# Patient Record
Sex: FEMALE | Race: WHITE | NOT HISPANIC OR LATINO | URBAN - METROPOLITAN AREA
[De-identification: names, ages, dates, MRNs, and addresses within clinical notes are randomized per-mention and may not be internally consistent; named-entity substitution may affect disease eponyms.]

---

## 2017-09-02 ENCOUNTER — EMERGENCY (EMERGENCY)
Facility: HOSPITAL | Age: 72
LOS: 0 days | Discharge: HOME | End: 2017-09-03

## 2017-09-02 DIAGNOSIS — E87.6 HYPOKALEMIA: ICD-10-CM

## 2017-09-02 DIAGNOSIS — Z90.49 ACQUIRED ABSENCE OF OTHER SPECIFIED PARTS OF DIGESTIVE TRACT: ICD-10-CM

## 2017-09-02 DIAGNOSIS — E87.1 HYPO-OSMOLALITY AND HYPONATREMIA: ICD-10-CM

## 2017-09-02 DIAGNOSIS — R07.9 CHEST PAIN, UNSPECIFIED: ICD-10-CM

## 2017-09-02 DIAGNOSIS — J15.9 UNSPECIFIED BACTERIAL PNEUMONIA: ICD-10-CM

## 2017-09-02 DIAGNOSIS — I10 ESSENTIAL (PRIMARY) HYPERTENSION: ICD-10-CM

## 2017-09-02 DIAGNOSIS — K14.8 OTHER DISEASES OF TONGUE: ICD-10-CM

## 2017-09-02 DIAGNOSIS — I25.810 ATHEROSCLEROSIS OF CORONARY ARTERY BYPASS GRAFT(S) WITHOUT ANGINA PECTORIS: ICD-10-CM

## 2017-09-02 DIAGNOSIS — Z88.0 ALLERGY STATUS TO PENICILLIN: ICD-10-CM

## 2017-09-02 DIAGNOSIS — Z95.1 PRESENCE OF AORTOCORONARY BYPASS GRAFT: ICD-10-CM

## 2017-09-02 DIAGNOSIS — Z79.899 OTHER LONG TERM (CURRENT) DRUG THERAPY: ICD-10-CM

## 2017-09-02 DIAGNOSIS — M54.9 DORSALGIA, UNSPECIFIED: ICD-10-CM

## 2018-02-13 ENCOUNTER — INPATIENT (INPATIENT)
Facility: HOSPITAL | Age: 73
LOS: 1 days | Discharge: HOME | End: 2018-02-15
Attending: HOSPITALIST

## 2018-02-13 VITALS
HEIGHT: 62 IN | SYSTOLIC BLOOD PRESSURE: 154 MMHG | HEART RATE: 114 BPM | TEMPERATURE: 101 F | DIASTOLIC BLOOD PRESSURE: 100 MMHG | RESPIRATION RATE: 19 BRPM | WEIGHT: 132.06 LBS | OXYGEN SATURATION: 93 %

## 2018-02-13 LAB
APTT BLD: 23.3 SEC — CRITICAL LOW (ref 27–39.2)
BASOPHILS # BLD AUTO: 0.02 K/UL — SIGNIFICANT CHANGE UP (ref 0–0.2)
BASOPHILS NFR BLD AUTO: 0.1 % — SIGNIFICANT CHANGE UP (ref 0–1)
CK MB CFR SERPL CALC: 1 NG/ML — SIGNIFICANT CHANGE UP (ref 0.6–6.3)
EOSINOPHIL # BLD AUTO: 0.15 K/UL — SIGNIFICANT CHANGE UP (ref 0–0.7)
EOSINOPHIL NFR BLD AUTO: 0.8 % — SIGNIFICANT CHANGE UP (ref 0–8)
FLU A RESULT: NEGATIVE — SIGNIFICANT CHANGE UP
FLU A RESULT: NEGATIVE — SIGNIFICANT CHANGE UP
FLUAV AG NPH QL: NEGATIVE — SIGNIFICANT CHANGE UP
FLUBV AG NPH QL: NEGATIVE — SIGNIFICANT CHANGE UP
HCT VFR BLD CALC: 44.4 % — SIGNIFICANT CHANGE UP (ref 37–47)
HGB BLD-MCNC: 15.2 G/DL — SIGNIFICANT CHANGE UP (ref 14–18)
IMM GRANULOCYTES NFR BLD AUTO: 1.1 % — HIGH (ref 0.1–0.3)
INR BLD: 1.19 RATIO — SIGNIFICANT CHANGE UP (ref 0.65–1.3)
LACTATE SERPL-SCNC: 2.9 MMOL/L — HIGH (ref 0.5–2.2)
LYMPHOCYTES # BLD AUTO: 0.53 K/UL — LOW (ref 1.2–3.4)
LYMPHOCYTES # BLD AUTO: 2.8 % — LOW (ref 20.5–51.1)
MCHC RBC-ENTMCNC: 29.2 PG — SIGNIFICANT CHANGE UP (ref 27–31)
MCHC RBC-ENTMCNC: 34.2 G/DL — SIGNIFICANT CHANGE UP (ref 32–37)
MCV RBC AUTO: 85.2 FL — SIGNIFICANT CHANGE UP (ref 81–91)
MONOCYTES # BLD AUTO: 1.04 K/UL — HIGH (ref 0.1–0.6)
MONOCYTES NFR BLD AUTO: 5.5 % — SIGNIFICANT CHANGE UP (ref 1.7–9.3)
NEUTROPHILS # BLD AUTO: 16.92 K/UL — HIGH (ref 1.4–6.5)
NEUTROPHILS NFR BLD AUTO: 89.7 % — HIGH (ref 42.2–75.2)
NRBC # BLD: 0 /100 WBCS — SIGNIFICANT CHANGE UP (ref 0–0)
PLATELET # BLD AUTO: 188 K/UL — SIGNIFICANT CHANGE UP (ref 130–400)
PROTHROM AB SERPL-ACNC: 13 SEC — HIGH (ref 9.95–12.87)
RBC # BLD: 5.21 M/UL — SIGNIFICANT CHANGE UP (ref 4.2–5.4)
RBC # FLD: 12.7 % — SIGNIFICANT CHANGE UP (ref 11.5–14.5)
TROPONIN I SERPL-MCNC: 0.02 NG/ML — SIGNIFICANT CHANGE UP (ref 0–0.05)
WBC # BLD: 18.86 K/UL — HIGH (ref 4.8–10.8)
WBC # FLD AUTO: 18.86 K/UL — HIGH (ref 4.8–10.8)

## 2018-02-13 RX ORDER — SODIUM CHLORIDE 9 MG/ML
1000 INJECTION INTRAMUSCULAR; INTRAVENOUS; SUBCUTANEOUS ONCE
Qty: 0 | Refills: 0 | Status: COMPLETED | OUTPATIENT
Start: 2018-02-13 | End: 2018-02-13

## 2018-02-13 RX ORDER — MORPHINE SULFATE 50 MG/1
4 CAPSULE, EXTENDED RELEASE ORAL ONCE
Qty: 0 | Refills: 0 | Status: DISCONTINUED | OUTPATIENT
Start: 2018-02-13 | End: 2018-02-13

## 2018-02-13 RX ADMIN — MORPHINE SULFATE 4 MILLIGRAM(S): 50 CAPSULE, EXTENDED RELEASE ORAL at 23:38

## 2018-02-13 RX ADMIN — SODIUM CHLORIDE 1000 MILLILITER(S): 9 INJECTION INTRAMUSCULAR; INTRAVENOUS; SUBCUTANEOUS at 23:09

## 2018-02-13 NOTE — ED PROVIDER NOTE - PROGRESS NOTE DETAILS
I personally evaluated the patient. I reviewed the Resident’s or Physician Assistant’s note (as assigned above), and agree with the findings and plan except as documented in my note.   72yF with hx of HTN, herniated disks, received the flu vaccine this year presents to ED for multiple complaints.  Pt states that last night she developed a dizziness, with nausea, vomiting, and chest pressure.  Vomitus was 3 episodes, non bloody.  Pt throughout today has been with coming and going pressure to the chest.  Pt this evening developed sharp pain to the lower abd.  Pt in the ED states that she is without any CP, SOB, or nausea.  Pt in ED is c/o lower abd pain.  Pt also adds that she follows with pain management for her chronic back pain, states that over the last week she has been having a worsening of her typical back pain.  No trauma, falls, or injury to area.  No sweating.  No SOB. No LOC.  No leg pain or leg swelling.  No cough.  No sore throat.  No dysuria, hematuria.  Pt notes increased urination.  ON EXAM:   Pt is well appearing, non toxic.  Febrile.  CVS tachycardic, regular.  Resp CTA b/l.  abd soft nt/nd. No midline vertebral tendneress. Neck supple neurologically intact   PLAN:  Will check labs, EKG, CXray,  UA, and re eval D/w patient all results and about lymph node and copy given to patient.

## 2018-02-13 NOTE — ED PROVIDER NOTE - NS ED ROS FT
Review of Systems:  	•	CONSTITUTIONAL - no fever, no diaphoresis, no chills  	•	SKIN - no rash  	•	EYES - no eye pain, no blurry vision  	•	ENT - no congestion, no sore throat  	•	RESPIRATORY - no shortness of breath, no cough  	•	CARDIAC - + chest pain, no palpitations  	•	GI - +dark stool + abd pain, + nausea, no vomiting, no diarrhea, no constipation  	•	GENITO-URINARY - + dysuria; no hematuria, no increased urinary frequency  	•	MUSCULOSKELETAL - no joint paint, no swelling, no redness  	•	NEUROLOGIC - no weakness, no headache

## 2018-02-13 NOTE — ED PROVIDER NOTE - CARE PLAN
Principal Discharge DX:	Pyelonephritis  Secondary Diagnosis:	Bronchiectasis  Secondary Diagnosis:	Leukocytosis

## 2018-02-13 NOTE — ED PROVIDER NOTE - PHYSICAL EXAMINATION
VITAL SIGNS: I have reviewed nursing notes and confirm.  CONSTITUTIONAL: Well-developed; well-nourished; in no acute distress.  SKIN: Skin exam is warm and dry, no acute rash.  HEAD: Normocephalic; atraumatic.  EYES: PERRL, EOM intact; conjunctiva and sclera clear.  ENT: No nasal discharge; airway clear.   NECK: Supple; non tender.  CARD: S1, S2 normal; no murmurs, gallops, or rubs. Regular rate and rhythm.  RESP: Clear to auscultation bilaterally. No wheezes, rales or rhonchi.  ABD: Normal bowel sounds; soft; non-distended; mild diffuse tenderness. No rebound tenderness or guarding.   Rectal: Guaiac - brown stool guaiac negative  EXT: Normal ROM. No clubbing, cyanosis or edema.  LYMPH: No acute cervical adenopathy.  NEURO: Alert, oriented. Grossly unremarkable. No focal deficits.  PSYCH: Cooperative, appropriate.

## 2018-02-13 NOTE — ED PROVIDER NOTE - OBJECTIVE STATEMENT
71 yo F with pmhx of CAD s/p CABG, HTN, appendectomy, multiple  presenting with non-radiating, intermittent pressure like left sided pain associated 73 yo F with pmhx of CAD s/p CABG, HTN, appendectomy, multiple  presenting with non-radiating, intermittent pressure like left sided pain associated lightheadedness, nausea, lower abdominal pain, and 1 episode of dark stool yesterday. Denies sob, fever, chills, back pain, headache, cough, congestion, sore throat, joint pain, leg swelling/calf pain, or weakness.

## 2018-02-14 DIAGNOSIS — M54.9 DORSALGIA, UNSPECIFIED: ICD-10-CM

## 2018-02-14 DIAGNOSIS — I25.10 ATHEROSCLEROTIC HEART DISEASE OF NATIVE CORONARY ARTERY WITHOUT ANGINA PECTORIS: ICD-10-CM

## 2018-02-14 DIAGNOSIS — Z95.1 PRESENCE OF AORTOCORONARY BYPASS GRAFT: Chronic | ICD-10-CM

## 2018-02-14 DIAGNOSIS — Z96.631 PRESENCE OF RIGHT ARTIFICIAL WRIST JOINT: Chronic | ICD-10-CM

## 2018-02-14 DIAGNOSIS — I10 ESSENTIAL (PRIMARY) HYPERTENSION: ICD-10-CM

## 2018-02-14 DIAGNOSIS — Z87.19 PERSONAL HISTORY OF OTHER DISEASES OF THE DIGESTIVE SYSTEM: Chronic | ICD-10-CM

## 2018-02-14 DIAGNOSIS — Z98.890 OTHER SPECIFIED POSTPROCEDURAL STATES: Chronic | ICD-10-CM

## 2018-02-14 DIAGNOSIS — N12 TUBULO-INTERSTITIAL NEPHRITIS, NOT SPECIFIED AS ACUTE OR CHRONIC: ICD-10-CM

## 2018-02-14 LAB
ALBUMIN SERPL ELPH-MCNC: 4 G/DL — SIGNIFICANT CHANGE UP (ref 3–5.5)
ALP SERPL-CCNC: 56 U/L — SIGNIFICANT CHANGE UP (ref 30–115)
ALT FLD-CCNC: 29 U/L — SIGNIFICANT CHANGE UP (ref 0–41)
ANION GAP SERPL CALC-SCNC: 14 MMOL/L — SIGNIFICANT CHANGE UP (ref 7–14)
ANION GAP SERPL CALC-SCNC: 9 MMOL/L — SIGNIFICANT CHANGE UP (ref 7–14)
APPEARANCE UR: CLEAR — SIGNIFICANT CHANGE UP
AST SERPL-CCNC: 47 U/L — HIGH (ref 0–41)
BACTERIA # UR AUTO: (no result)
BASOPHILS # BLD AUTO: 0.01 K/UL — SIGNIFICANT CHANGE UP (ref 0–0.2)
BASOPHILS NFR BLD AUTO: 0.1 % — SIGNIFICANT CHANGE UP (ref 0–1)
BILIRUB SERPL-MCNC: 1.6 MG/DL — HIGH (ref 0.2–1.2)
BILIRUB UR-MCNC: NEGATIVE — SIGNIFICANT CHANGE UP
BUN SERPL-MCNC: 19 MG/DL — SIGNIFICANT CHANGE UP (ref 10–20)
BUN SERPL-MCNC: 20 MG/DL — SIGNIFICANT CHANGE UP (ref 10–20)
CALCIUM SERPL-MCNC: 8.6 MG/DL — SIGNIFICANT CHANGE UP (ref 8.5–10.1)
CALCIUM SERPL-MCNC: 9 MG/DL — SIGNIFICANT CHANGE UP (ref 8.5–10.1)
CHLORIDE SERPL-SCNC: 90 MMOL/L — LOW (ref 98–110)
CHLORIDE SERPL-SCNC: 98 MMOL/L — SIGNIFICANT CHANGE UP (ref 98–110)
CHOLEST SERPL-MCNC: 182 MG/DL — SIGNIFICANT CHANGE UP (ref 100–200)
CK MB BLD-MCNC: 1 % — SIGNIFICANT CHANGE UP (ref 0–4)
CK SERPL-CCNC: 80 U/L — SIGNIFICANT CHANGE UP (ref 0–225)
CO2 SERPL-SCNC: 29 MMOL/L — SIGNIFICANT CHANGE UP (ref 17–32)
CO2 SERPL-SCNC: 30 MMOL/L — SIGNIFICANT CHANGE UP (ref 17–32)
COD CRY URNS QL: NEGATIVE — SIGNIFICANT CHANGE UP
COLOR SPEC: YELLOW — SIGNIFICANT CHANGE UP
CREAT SERPL-MCNC: 0.8 MG/DL — SIGNIFICANT CHANGE UP (ref 0.7–1.5)
CREAT SERPL-MCNC: 0.9 MG/DL — SIGNIFICANT CHANGE UP (ref 0.7–1.5)
DIFF PNL FLD: SIGNIFICANT CHANGE UP
EOSINOPHIL # BLD AUTO: 0.57 K/UL — SIGNIFICANT CHANGE UP (ref 0–0.7)
EOSINOPHIL NFR BLD AUTO: 4.9 % — SIGNIFICANT CHANGE UP (ref 0–8)
EPI CELLS # UR: (no result) /HPF
GLUCOSE SERPL-MCNC: 182 MG/DL — HIGH (ref 70–110)
GLUCOSE SERPL-MCNC: 83 MG/DL — SIGNIFICANT CHANGE UP (ref 70–110)
GLUCOSE UR QL: NEGATIVE — SIGNIFICANT CHANGE UP
GRAN CASTS # UR COMP ASSIST: NEGATIVE — SIGNIFICANT CHANGE UP
HCT VFR BLD CALC: 41 % — SIGNIFICANT CHANGE UP (ref 37–47)
HGB BLD-MCNC: 14.2 G/DL — SIGNIFICANT CHANGE UP (ref 14–18)
HYALINE CASTS # UR AUTO: NEGATIVE — SIGNIFICANT CHANGE UP
IMM GRANULOCYTES NFR BLD AUTO: 0.3 % — SIGNIFICANT CHANGE UP (ref 0.1–0.3)
KETONES UR-MCNC: NEGATIVE — SIGNIFICANT CHANGE UP
LEUKOCYTE ESTERASE UR-ACNC: (no result)
LIDOCAIN IGE QN: 23 U/L — SIGNIFICANT CHANGE UP (ref 7–60)
LYMPHOCYTES # BLD AUTO: 0.77 K/UL — LOW (ref 1.2–3.4)
LYMPHOCYTES # BLD AUTO: 6.6 % — LOW (ref 20.5–51.1)
MAGNESIUM SERPL-MCNC: 1.8 MG/DL — SIGNIFICANT CHANGE UP (ref 1.8–2.4)
MCHC RBC-ENTMCNC: 29.8 PG — SIGNIFICANT CHANGE UP (ref 27–31)
MCHC RBC-ENTMCNC: 34.6 G/DL — SIGNIFICANT CHANGE UP (ref 32–37)
MCV RBC AUTO: 86 FL — SIGNIFICANT CHANGE UP (ref 81–91)
MONOCYTES # BLD AUTO: 0.97 K/UL — HIGH (ref 0.1–0.6)
MONOCYTES NFR BLD AUTO: 8.3 % — SIGNIFICANT CHANGE UP (ref 1.7–9.3)
NEUTROPHILS # BLD AUTO: 9.28 K/UL — HIGH (ref 1.4–6.5)
NEUTROPHILS NFR BLD AUTO: 79.8 % — HIGH (ref 42.2–75.2)
NITRITE UR-MCNC: NEGATIVE — SIGNIFICANT CHANGE UP
NRBC # BLD: 0 /100 WBCS — SIGNIFICANT CHANGE UP (ref 0–0)
PH UR: 6.5 — SIGNIFICANT CHANGE UP (ref 5–8)
PLATELET # BLD AUTO: 198 K/UL — SIGNIFICANT CHANGE UP (ref 130–400)
POTASSIUM SERPL-MCNC: 3.2 MMOL/L — LOW (ref 3.5–5)
POTASSIUM SERPL-MCNC: 3.4 MMOL/L — LOW (ref 3.5–5)
POTASSIUM SERPL-SCNC: 3.2 MMOL/L — LOW (ref 3.5–5)
POTASSIUM SERPL-SCNC: 3.4 MMOL/L — LOW (ref 3.5–5)
PROT SERPL-MCNC: 7.5 G/DL — SIGNIFICANT CHANGE UP (ref 6–8)
PROT UR-MCNC: 30
RBC # BLD: 4.77 M/UL — SIGNIFICANT CHANGE UP (ref 4.2–5.4)
RBC # FLD: 13 % — SIGNIFICANT CHANGE UP (ref 11.5–14.5)
RBC CASTS # UR COMP ASSIST: SIGNIFICANT CHANGE UP /HPF
SODIUM SERPL-SCNC: 133 MMOL/L — LOW (ref 135–146)
SODIUM SERPL-SCNC: 137 MMOL/L — SIGNIFICANT CHANGE UP (ref 135–146)
SP GR SPEC: 1.01 — SIGNIFICANT CHANGE UP (ref 1.01–1.03)
TRI-PHOS CRY UR QL COMP ASSIST: NEGATIVE — SIGNIFICANT CHANGE UP
URATE CRY FLD QL MICRO: NEGATIVE — SIGNIFICANT CHANGE UP
UROBILINOGEN FLD QL: 0.2 — SIGNIFICANT CHANGE UP (ref 0.2–0.2)
WBC # BLD: 11.64 K/UL — HIGH (ref 4.8–10.8)
WBC # FLD AUTO: 11.64 K/UL — HIGH (ref 4.8–10.8)
WBC UR QL: (no result) /HPF

## 2018-02-14 RX ORDER — GABAPENTIN 400 MG/1
1 CAPSULE ORAL
Qty: 0 | Refills: 0 | COMMUNITY

## 2018-02-14 RX ORDER — ONDANSETRON 8 MG/1
4 TABLET, FILM COATED ORAL EVERY 6 HOURS
Qty: 0 | Refills: 0 | Status: DISCONTINUED | OUTPATIENT
Start: 2018-02-14 | End: 2018-02-15

## 2018-02-14 RX ORDER — SIMVASTATIN 20 MG/1
40 TABLET, FILM COATED ORAL AT BEDTIME
Qty: 0 | Refills: 0 | Status: DISCONTINUED | OUTPATIENT
Start: 2018-02-14 | End: 2018-02-15

## 2018-02-14 RX ORDER — VALSARTAN 80 MG/1
160 TABLET ORAL DAILY
Qty: 0 | Refills: 0 | Status: DISCONTINUED | OUTPATIENT
Start: 2018-02-14 | End: 2018-02-15

## 2018-02-14 RX ORDER — METOPROLOL TARTRATE 50 MG
25 TABLET ORAL DAILY
Qty: 0 | Refills: 0 | Status: DISCONTINUED | OUTPATIENT
Start: 2018-02-14 | End: 2018-02-15

## 2018-02-14 RX ORDER — SODIUM CHLORIDE 9 MG/ML
1000 INJECTION INTRAMUSCULAR; INTRAVENOUS; SUBCUTANEOUS
Qty: 0 | Refills: 0 | Status: DISCONTINUED | OUTPATIENT
Start: 2018-02-14 | End: 2018-02-15

## 2018-02-14 RX ORDER — TRAMADOL HYDROCHLORIDE 50 MG/1
50 TABLET ORAL
Qty: 0 | Refills: 0 | Status: DISCONTINUED | OUTPATIENT
Start: 2018-02-14 | End: 2018-02-15

## 2018-02-14 RX ORDER — POTASSIUM CHLORIDE 20 MEQ
40 PACKET (EA) ORAL ONCE
Qty: 0 | Refills: 0 | Status: COMPLETED | OUTPATIENT
Start: 2018-02-14 | End: 2018-02-14

## 2018-02-14 RX ORDER — CLOPIDOGREL BISULFATE 75 MG/1
75 TABLET, FILM COATED ORAL DAILY
Qty: 0 | Refills: 0 | Status: DISCONTINUED | OUTPATIENT
Start: 2018-02-14 | End: 2018-02-15

## 2018-02-14 RX ORDER — TRAMADOL HYDROCHLORIDE 50 MG/1
50 TABLET ORAL DAILY
Qty: 0 | Refills: 0 | Status: DISCONTINUED | OUTPATIENT
Start: 2018-02-14 | End: 2018-02-14

## 2018-02-14 RX ORDER — ACETAMINOPHEN 500 MG
650 TABLET ORAL EVERY 4 HOURS
Qty: 0 | Refills: 0 | Status: DISCONTINUED | OUTPATIENT
Start: 2018-02-14 | End: 2018-02-15

## 2018-02-14 RX ORDER — HYDROCHLOROTHIAZIDE 25 MG
12.5 TABLET ORAL DAILY
Qty: 0 | Refills: 0 | Status: DISCONTINUED | OUTPATIENT
Start: 2018-02-14 | End: 2018-02-15

## 2018-02-14 RX ORDER — TRAMADOL HYDROCHLORIDE 50 MG/1
1 TABLET ORAL
Qty: 0 | Refills: 0 | COMMUNITY

## 2018-02-14 RX ORDER — SODIUM CHLORIDE 9 MG/ML
1000 INJECTION INTRAMUSCULAR; INTRAVENOUS; SUBCUTANEOUS
Qty: 0 | Refills: 0 | Status: DISCONTINUED | OUTPATIENT
Start: 2018-02-14 | End: 2018-02-14

## 2018-02-14 RX ADMIN — VALSARTAN 160 MILLIGRAM(S): 80 TABLET ORAL at 11:22

## 2018-02-14 RX ADMIN — Medication 40 MILLIEQUIVALENT(S): at 06:08

## 2018-02-14 RX ADMIN — SIMVASTATIN 40 MILLIGRAM(S): 20 TABLET, FILM COATED ORAL at 23:18

## 2018-02-14 RX ADMIN — TRAMADOL HYDROCHLORIDE 50 MILLIGRAM(S): 50 TABLET ORAL at 23:18

## 2018-02-14 RX ADMIN — SODIUM CHLORIDE 200 MILLILITER(S): 9 INJECTION INTRAMUSCULAR; INTRAVENOUS; SUBCUTANEOUS at 01:25

## 2018-02-14 RX ADMIN — TRAMADOL HYDROCHLORIDE 50 MILLIGRAM(S): 50 TABLET ORAL at 09:24

## 2018-02-14 RX ADMIN — TRAMADOL HYDROCHLORIDE 50 MILLIGRAM(S): 50 TABLET ORAL at 08:47

## 2018-02-14 RX ADMIN — SODIUM CHLORIDE 100 MILLILITER(S): 9 INJECTION INTRAMUSCULAR; INTRAVENOUS; SUBCUTANEOUS at 13:47

## 2018-02-14 RX ADMIN — CLOPIDOGREL BISULFATE 75 MILLIGRAM(S): 75 TABLET, FILM COATED ORAL at 11:22

## 2018-02-14 RX ADMIN — Medication 12.5 MILLIGRAM(S): at 08:47

## 2018-02-14 RX ADMIN — Medication 25 MILLIGRAM(S): at 08:46

## 2018-02-14 NOTE — H&P ADULT - ATTENDING COMMENTS
72 y.o female with extensive past medical/surgical history presents for pains to left back and left side of the head. No reports of fevers though there was significant leukocytosis on cbc done in er. Also a "positive" urinalysis was found. PHYSICALEXAM IS UNREMARKABLE  with heent-wnl (no lesions seen to left ear region where patient reports pain).LUNGS-CLEAR CV-reg ABD-soft EXT-NO C/C/E NEURO-no gross deficits. ASSESSMENT is pyelonephritis and will treat with iv antibiotics started in er. Watch hypokalemia

## 2018-02-14 NOTE — H&P ADULT - PSH
delivery delivered    H/O appendicitis    History of appendectomy    History of right wrist replacement    S/P CABG (coronary artery bypass graft)

## 2018-02-14 NOTE — H&P ADULT - HISTORY OF PRESENT ILLNESS
71 yo F with pmhx of CAD s/p CABG, HTN, appendectomy, multiple  presenting with non-radiating, intermittent pressure like left sided pain associated lightheadedness, nausea, lower abdominal pain, and 1 episode of dark stool yesterday. Denies sob, fever, chills, back pain, headache, cough, congestion, sore throat, joint pain, leg swelling/calf pain, or weakness 73 yo F presents with non-radiating, intermittent pressure on left side, with associated pain, lightheadedness, nausea, lower abdominal pain, and 1 episode of dark stool yesterday. Denies sob, fever, chills, back pain, headache, cough, congestion, sore throat, joint pain, leg swelling/calf pain, or weakness

## 2018-02-14 NOTE — H&P ADULT - PMH
CAD (coronary artery disease)    Chronic midline back pain, unspecified back location    HTN (hypertension)

## 2018-02-14 NOTE — ED ADULT NURSE NOTE - PSH
History of right wrist replacement  delivery delivered    H/O appendicitis    History of right wrist replacement

## 2018-02-15 VITALS
SYSTOLIC BLOOD PRESSURE: 172 MMHG | RESPIRATION RATE: 18 BRPM | TEMPERATURE: 96 F | OXYGEN SATURATION: 99 % | DIASTOLIC BLOOD PRESSURE: 75 MMHG | HEART RATE: 71 BPM

## 2018-02-15 LAB
HCT VFR BLD CALC: 40.8 % — SIGNIFICANT CHANGE UP (ref 37–47)
HGB BLD-MCNC: 14.1 G/DL — SIGNIFICANT CHANGE UP (ref 14–18)
MCHC RBC-ENTMCNC: 29.7 PG — SIGNIFICANT CHANGE UP (ref 27–31)
MCHC RBC-ENTMCNC: 34.6 G/DL — SIGNIFICANT CHANGE UP (ref 32–37)
MCV RBC AUTO: 86.1 FL — SIGNIFICANT CHANGE UP (ref 81–91)
NRBC # BLD: 0 /100 WBCS — SIGNIFICANT CHANGE UP (ref 0–0)
PLATELET # BLD AUTO: 217 K/UL — SIGNIFICANT CHANGE UP (ref 130–400)
RBC # BLD: 4.74 M/UL — SIGNIFICANT CHANGE UP (ref 4.2–5.4)
RBC # FLD: 13 % — SIGNIFICANT CHANGE UP (ref 11.5–14.5)
WBC # BLD: 7.94 K/UL — SIGNIFICANT CHANGE UP (ref 4.8–10.8)
WBC # FLD AUTO: 7.94 K/UL — SIGNIFICANT CHANGE UP (ref 4.8–10.8)

## 2018-02-15 RX ORDER — MORPHINE SULFATE 50 MG/1
2 CAPSULE, EXTENDED RELEASE ORAL EVERY 4 HOURS
Qty: 0 | Refills: 0 | Status: DISCONTINUED | OUTPATIENT
Start: 2018-02-15 | End: 2018-02-15

## 2018-02-15 RX ORDER — MORPHINE SULFATE 50 MG/1
4 CAPSULE, EXTENDED RELEASE ORAL EVERY 4 HOURS
Qty: 0 | Refills: 0 | Status: DISCONTINUED | OUTPATIENT
Start: 2018-02-15 | End: 2018-02-15

## 2018-02-15 RX ORDER — MORPHINE SULFATE 50 MG/1
6 CAPSULE, EXTENDED RELEASE ORAL EVERY 4 HOURS
Qty: 0 | Refills: 0 | Status: DISCONTINUED | OUTPATIENT
Start: 2018-02-15 | End: 2018-02-15

## 2018-02-15 RX ORDER — TEMAZEPAM 15 MG/1
15 CAPSULE ORAL AT BEDTIME
Qty: 0 | Refills: 0 | Status: DISCONTINUED | OUTPATIENT
Start: 2018-02-15 | End: 2018-02-15

## 2018-02-15 RX ORDER — CIPROFLOXACIN LACTATE 400MG/40ML
1 VIAL (ML) INTRAVENOUS
Qty: 5 | Refills: 0
Start: 2018-02-15 | End: 2018-02-19

## 2018-02-15 RX ORDER — ACETAMINOPHEN 500 MG
2 TABLET ORAL
Qty: 0 | Refills: 0 | DISCHARGE
Start: 2018-02-15

## 2018-02-15 RX ORDER — TRAMADOL HYDROCHLORIDE 50 MG/1
1 TABLET ORAL
Qty: 0 | Refills: 0 | COMMUNITY

## 2018-02-15 RX ADMIN — SODIUM CHLORIDE 100 MILLILITER(S): 9 INJECTION INTRAMUSCULAR; INTRAVENOUS; SUBCUTANEOUS at 01:41

## 2018-02-15 RX ADMIN — VALSARTAN 160 MILLIGRAM(S): 80 TABLET ORAL at 06:58

## 2018-02-15 RX ADMIN — ONDANSETRON 4 MILLIGRAM(S): 8 TABLET, FILM COATED ORAL at 08:15

## 2018-02-15 RX ADMIN — MORPHINE SULFATE 6 MILLIGRAM(S): 50 CAPSULE, EXTENDED RELEASE ORAL at 03:30

## 2018-02-15 RX ADMIN — TEMAZEPAM 15 MILLIGRAM(S): 15 CAPSULE ORAL at 01:46

## 2018-02-15 RX ADMIN — MORPHINE SULFATE 6 MILLIGRAM(S): 50 CAPSULE, EXTENDED RELEASE ORAL at 02:12

## 2018-02-15 RX ADMIN — CLOPIDOGREL BISULFATE 75 MILLIGRAM(S): 75 TABLET, FILM COATED ORAL at 12:27

## 2018-02-15 RX ADMIN — TRAMADOL HYDROCHLORIDE 50 MILLIGRAM(S): 50 TABLET ORAL at 01:41

## 2018-02-15 RX ADMIN — Medication 25 MILLIGRAM(S): at 06:58

## 2018-02-15 RX ADMIN — Medication 12.5 MILLIGRAM(S): at 06:58

## 2018-02-15 NOTE — DISCHARGE NOTE ADULT - CARE PLAN
Principal Discharge DX:	Pyelonephritis  Goal:	prevent recurrence  Assessment and plan of treatment:	take antibiotics as prescribed  Secondary Diagnosis:	Coronary artery disease involving native coronary artery with other forms of angina pectoris, unspecified whether native or transplanted heart  Goal:	continue home meds  Assessment and plan of treatment:	continue home meds  Secondary Diagnosis:	Hypertension, unspecified type  Goal:	control Blood pressure  Assessment and plan of treatment:	continue home meds

## 2018-02-15 NOTE — DISCHARGE NOTE ADULT - MEDICATION SUMMARY - MEDICATIONS TO TAKE
I will START or STAY ON the medications listed below when I get home from the hospital:    acetaminophen 325 mg oral tablet  -- 2 tab(s) by mouth every 4 hours, As needed, For Temp greater than 38 C (100.4 F)  -- Indication: For Pain    valsartan 160 mg oral tablet  -- 1 tab(s) by mouth once a day  -- Indication: For Hypertension    Horizant 600 mg oral tablet, extended release  -- 1 tab(s) by mouth once a day  -- Indication: For as previously taken    simvastatin 40 mg oral tablet  -- 1 tab(s) by mouth once a day (at bedtime)  -- Indication: For Cholesterol    clopidogrel 75 mg oral tablet  -- 1 tab(s) by mouth once a day  -- Indication: For CAD (coronary artery disease)    metoprolol succinate 25 mg oral tablet, extended release  -- 1 tab(s) by mouth once a day  -- Indication: For HTN (hypertension)    risedronate 150 mg oral tablet  -- 1 tab(s) by mouth once a month  -- Indication: For Bones    hydroCHLOROthiazide 12.5 mg oral capsule  -- 1 cap(s) by mouth once a day  -- Indication: For Hypertension    Levaquin 500 mg oral tablet  -- 1 tab(s) by mouth once a day MDD:1  -- Avoid prolonged or excessive exposure to direct and/or artificial sunlight while taking this medication.  Do not take dairy products, antacids, or iron preparations within one hour of this medication.  Finish all this medication unless otherwise directed by prescriber.  May cause drowsiness or dizziness.  Medication should be taken with plenty of water.    -- Indication: For urinary tract infection

## 2018-02-15 NOTE — DISCHARGE NOTE ADULT - HOSPITAL COURSE
Patient seen and examined by me this afternoon.  She initially presented with complaint of back pain and weakness. She went to urgent care prior and was diagnosed with a UTI. Patient was found to have a white count of 18 on admission and given IV abx and IV fluids. WBC today is 7.  She feels well and would like to go home.  Discharge planning done by me.  Time spent > 60 minutes

## 2018-02-15 NOTE — DISCHARGE NOTE ADULT - PATIENT PORTAL LINK FT
You can access the [facility] Patient Portal, offered by [facility], by registering with the following website: [email or web address]

## 2018-02-15 NOTE — DISCHARGE NOTE ADULT - SECONDARY DIAGNOSIS.
Coronary artery disease involving native coronary artery with other forms of angina pectoris, unspecified whether native or transplanted heart Hypertension, unspecified type

## 2018-02-20 LAB
CULTURE RESULTS: SIGNIFICANT CHANGE UP
CULTURE RESULTS: SIGNIFICANT CHANGE UP
SPECIMEN SOURCE: SIGNIFICANT CHANGE UP
SPECIMEN SOURCE: SIGNIFICANT CHANGE UP

## 2018-02-21 DIAGNOSIS — N12 TUBULO-INTERSTITIAL NEPHRITIS, NOT SPECIFIED AS ACUTE OR CHRONIC: ICD-10-CM

## 2018-02-21 DIAGNOSIS — Z95.1 PRESENCE OF AORTOCORONARY BYPASS GRAFT: ICD-10-CM

## 2018-02-21 DIAGNOSIS — G89.29 OTHER CHRONIC PAIN: ICD-10-CM

## 2018-02-21 DIAGNOSIS — I10 ESSENTIAL (PRIMARY) HYPERTENSION: ICD-10-CM

## 2018-02-21 DIAGNOSIS — M54.9 DORSALGIA, UNSPECIFIED: ICD-10-CM

## 2018-02-21 DIAGNOSIS — D72.829 ELEVATED WHITE BLOOD CELL COUNT, UNSPECIFIED: ICD-10-CM

## 2018-02-21 DIAGNOSIS — I25.10 ATHEROSCLEROTIC HEART DISEASE OF NATIVE CORONARY ARTERY WITHOUT ANGINA PECTORIS: ICD-10-CM

## 2018-05-31 ENCOUNTER — APPOINTMENT (OUTPATIENT)
Dept: OTOLARYNGOLOGY | Facility: CLINIC | Age: 73
End: 2018-05-31

## 2018-10-12 ENCOUNTER — EMERGENCY (EMERGENCY)
Facility: HOSPITAL | Age: 73
LOS: 0 days | Discharge: HOME | End: 2018-10-12
Attending: EMERGENCY MEDICINE | Admitting: EMERGENCY MEDICINE

## 2018-10-12 VITALS
DIASTOLIC BLOOD PRESSURE: 69 MMHG | OXYGEN SATURATION: 97 % | HEART RATE: 56 BPM | RESPIRATION RATE: 17 BRPM | SYSTOLIC BLOOD PRESSURE: 157 MMHG

## 2018-10-12 VITALS
DIASTOLIC BLOOD PRESSURE: 105 MMHG | HEIGHT: 62 IN | WEIGHT: 130.07 LBS | RESPIRATION RATE: 18 BRPM | SYSTOLIC BLOOD PRESSURE: 231 MMHG | TEMPERATURE: 98 F | HEART RATE: 80 BPM | OXYGEN SATURATION: 96 %

## 2018-10-12 DIAGNOSIS — I25.10 ATHEROSCLEROTIC HEART DISEASE OF NATIVE CORONARY ARTERY WITHOUT ANGINA PECTORIS: ICD-10-CM

## 2018-10-12 DIAGNOSIS — Z79.02 LONG TERM (CURRENT) USE OF ANTITHROMBOTICS/ANTIPLATELETS: ICD-10-CM

## 2018-10-12 DIAGNOSIS — Z96.631 PRESENCE OF RIGHT ARTIFICIAL WRIST JOINT: ICD-10-CM

## 2018-10-12 DIAGNOSIS — Z88.8 ALLERGY STATUS TO OTHER DRUGS, MEDICAMENTS AND BIOLOGICAL SUBSTANCES: ICD-10-CM

## 2018-10-12 DIAGNOSIS — I10 ESSENTIAL (PRIMARY) HYPERTENSION: ICD-10-CM

## 2018-10-12 DIAGNOSIS — Z87.19 PERSONAL HISTORY OF OTHER DISEASES OF THE DIGESTIVE SYSTEM: Chronic | ICD-10-CM

## 2018-10-12 DIAGNOSIS — R07.9 CHEST PAIN, UNSPECIFIED: ICD-10-CM

## 2018-10-12 DIAGNOSIS — Z95.1 PRESENCE OF AORTOCORONARY BYPASS GRAFT: ICD-10-CM

## 2018-10-12 DIAGNOSIS — Z96.631 PRESENCE OF RIGHT ARTIFICIAL WRIST JOINT: Chronic | ICD-10-CM

## 2018-10-12 DIAGNOSIS — Z79.899 OTHER LONG TERM (CURRENT) DRUG THERAPY: ICD-10-CM

## 2018-10-12 DIAGNOSIS — Z90.49 ACQUIRED ABSENCE OF OTHER SPECIFIED PARTS OF DIGESTIVE TRACT: ICD-10-CM

## 2018-10-12 DIAGNOSIS — Z88.0 ALLERGY STATUS TO PENICILLIN: ICD-10-CM

## 2018-10-12 DIAGNOSIS — Z95.1 PRESENCE OF AORTOCORONARY BYPASS GRAFT: Chronic | ICD-10-CM

## 2018-10-12 DIAGNOSIS — M54.9 DORSALGIA, UNSPECIFIED: ICD-10-CM

## 2018-10-12 DIAGNOSIS — M54.12 RADICULOPATHY, CERVICAL REGION: ICD-10-CM

## 2018-10-12 DIAGNOSIS — Z98.890 OTHER SPECIFIED POSTPROCEDURAL STATES: Chronic | ICD-10-CM

## 2018-10-12 LAB
ALBUMIN SERPL ELPH-MCNC: 4.4 G/DL — SIGNIFICANT CHANGE UP (ref 3.5–5.2)
ALP SERPL-CCNC: 77 U/L — SIGNIFICANT CHANGE UP (ref 30–115)
ALT FLD-CCNC: 21 U/L — SIGNIFICANT CHANGE UP (ref 0–41)
ANION GAP SERPL CALC-SCNC: 17 MMOL/L — HIGH (ref 7–14)
AST SERPL-CCNC: 23 U/L — SIGNIFICANT CHANGE UP (ref 0–41)
BASOPHILS # BLD AUTO: 0.04 K/UL — SIGNIFICANT CHANGE UP (ref 0–0.2)
BASOPHILS NFR BLD AUTO: 0.5 % — SIGNIFICANT CHANGE UP (ref 0–1)
BILIRUB SERPL-MCNC: 0.6 MG/DL — SIGNIFICANT CHANGE UP (ref 0.2–1.2)
BUN SERPL-MCNC: 23 MG/DL — HIGH (ref 10–20)
CALCIUM SERPL-MCNC: 10.1 MG/DL — SIGNIFICANT CHANGE UP (ref 8.5–10.1)
CHLORIDE SERPL-SCNC: 95 MMOL/L — LOW (ref 98–110)
CK SERPL-CCNC: 322 U/L — HIGH (ref 0–225)
CO2 SERPL-SCNC: 29 MMOL/L — SIGNIFICANT CHANGE UP (ref 17–32)
CREAT SERPL-MCNC: 0.9 MG/DL — SIGNIFICANT CHANGE UP (ref 0.7–1.5)
EOSINOPHIL # BLD AUTO: 0.15 K/UL — SIGNIFICANT CHANGE UP (ref 0–0.7)
EOSINOPHIL NFR BLD AUTO: 1.8 % — SIGNIFICANT CHANGE UP (ref 0–8)
GLUCOSE SERPL-MCNC: 152 MG/DL — HIGH (ref 70–99)
HCT VFR BLD CALC: 43.3 % — SIGNIFICANT CHANGE UP (ref 37–47)
HGB BLD-MCNC: 15.1 G/DL — SIGNIFICANT CHANGE UP (ref 12–16)
IMM GRANULOCYTES NFR BLD AUTO: 0.2 % — SIGNIFICANT CHANGE UP (ref 0.1–0.3)
LACTATE SERPL-SCNC: 1.6 MMOL/L — SIGNIFICANT CHANGE UP (ref 0.5–2.2)
LYMPHOCYTES # BLD AUTO: 1.87 K/UL — SIGNIFICANT CHANGE UP (ref 1.2–3.4)
LYMPHOCYTES # BLD AUTO: 21.9 % — SIGNIFICANT CHANGE UP (ref 20.5–51.1)
MAGNESIUM SERPL-MCNC: 2 MG/DL — SIGNIFICANT CHANGE UP (ref 1.8–2.4)
MCHC RBC-ENTMCNC: 29.4 PG — SIGNIFICANT CHANGE UP (ref 27–31)
MCHC RBC-ENTMCNC: 34.9 G/DL — SIGNIFICANT CHANGE UP (ref 32–37)
MCV RBC AUTO: 84.4 FL — SIGNIFICANT CHANGE UP (ref 81–99)
MONOCYTES # BLD AUTO: 0.96 K/UL — HIGH (ref 0.1–0.6)
MONOCYTES NFR BLD AUTO: 11.2 % — HIGH (ref 1.7–9.3)
NEUTROPHILS # BLD AUTO: 5.5 K/UL — SIGNIFICANT CHANGE UP (ref 1.4–6.5)
NEUTROPHILS NFR BLD AUTO: 64.4 % — SIGNIFICANT CHANGE UP (ref 42.2–75.2)
NRBC # BLD: 0 /100 WBCS — SIGNIFICANT CHANGE UP (ref 0–0)
PLATELET # BLD AUTO: 269 K/UL — SIGNIFICANT CHANGE UP (ref 130–400)
POTASSIUM SERPL-MCNC: 3.8 MMOL/L — SIGNIFICANT CHANGE UP (ref 3.5–5)
POTASSIUM SERPL-SCNC: 3.8 MMOL/L — SIGNIFICANT CHANGE UP (ref 3.5–5)
PROT SERPL-MCNC: 7.6 G/DL — SIGNIFICANT CHANGE UP (ref 6–8)
RBC # BLD: 5.13 M/UL — SIGNIFICANT CHANGE UP (ref 4.2–5.4)
RBC # FLD: 12.5 % — SIGNIFICANT CHANGE UP (ref 11.5–14.5)
SODIUM SERPL-SCNC: 141 MMOL/L — SIGNIFICANT CHANGE UP (ref 135–146)
TROPONIN T SERPL-MCNC: <0.01 NG/ML — SIGNIFICANT CHANGE UP
TROPONIN T SERPL-MCNC: <0.01 NG/ML — SIGNIFICANT CHANGE UP
WBC # BLD: 8.54 K/UL — SIGNIFICANT CHANGE UP (ref 4.8–10.8)
WBC # FLD AUTO: 8.54 K/UL — SIGNIFICANT CHANGE UP (ref 4.8–10.8)

## 2018-10-12 RX ORDER — METHOCARBAMOL 500 MG/1
2 TABLET, FILM COATED ORAL
Qty: 42 | Refills: 0
Start: 2018-10-12 | End: 2018-10-18

## 2018-10-12 RX ORDER — OXYCODONE AND ACETAMINOPHEN 5; 325 MG/1; MG/1
1 TABLET ORAL ONCE
Qty: 0 | Refills: 0 | Status: DISCONTINUED | OUTPATIENT
Start: 2018-10-12 | End: 2018-10-12

## 2018-10-12 RX ORDER — SODIUM CHLORIDE 9 MG/ML
1000 INJECTION INTRAMUSCULAR; INTRAVENOUS; SUBCUTANEOUS ONCE
Qty: 0 | Refills: 0 | Status: DISCONTINUED | OUTPATIENT
Start: 2018-10-12 | End: 2018-10-12

## 2018-10-12 RX ORDER — METHOCARBAMOL 500 MG/1
1000 TABLET, FILM COATED ORAL ONCE
Qty: 0 | Refills: 0 | Status: COMPLETED | OUTPATIENT
Start: 2018-10-12 | End: 2018-10-12

## 2018-10-12 RX ADMIN — METHOCARBAMOL 1000 MILLIGRAM(S): 500 TABLET, FILM COATED ORAL at 04:37

## 2018-10-12 RX ADMIN — OXYCODONE AND ACETAMINOPHEN 1 TABLET(S): 5; 325 TABLET ORAL at 05:07

## 2018-10-12 NOTE — ED ADULT NURSE NOTE - PMH
CAD (coronary artery disease)    Chronic midline back pain, unspecified back location    Heart attack    HTN (hypertension)

## 2018-10-12 NOTE — ED PROVIDER NOTE - PHYSICAL EXAMINATION
VITAL SIGNS: I have reviewed nursing notes and confirm.  CONSTITUTIONAL: Well-developed; well-nourished; in no acute distress. pt comfortable.  SKIN: skin exam is warm and dry, no acute rash.   HEAD: Normocephalic; atraumatic.  EYES:  EOM intact; conjunctiva and sclera clear.  ENT: No nasal discharge; airway clear. moist oral mucosa;   NECK: Supple; non tender.  CARD: S1, S2 normal; no murmurs, gallops, or rubs. Regular rate and rhythm. posterior tibial and radial pulses 2+  RESP: No wheezes, rales or rhonchi. cta b/l. no use of accessory muscles. no retractions  ABD: Normal bowel sounds; soft; non-distended; non-tender; no rebound.   EXT: Normal ROM. No  cyanosis or edema.  BACK: No cva tenderness. tenderness to palpation to L. side of back.  LYMPH: No acute cervical adenopathy.  NEURO: Alert, oriented, grossly unremarkable.    PSYCH: Cooperative, appropriate.

## 2018-10-12 NOTE — ED PROVIDER NOTE - OBJECTIVE STATEMENT
71y/o F w/ hx of CAD (heart attack 2005 with quadruple bypass), htn, arthritis, fribromyalgia presents constant 9/10 L. back pain radiating to lateral chest onto l. chest. does not feel like her previous MI.  sob with pain.    Pt has been having neck pain and is not able to move neck that much for 3 weeks is due for an mri next week. no fevers, sore throat, or infectious symptoms.

## 2018-10-12 NOTE — ED PROVIDER NOTE - ATTENDING CONTRIBUTION TO CARE
Pt is a 71yo female with hx of MI and multiple disc hernations coming in with constant L sided neck and back and chest pain for 4days.  No SOB or hemoptysis.  Not improved with unknown medication prescribed by PMD.  No rash, no other complaints.    Exam: RRR, CTAB, reproducible pain over L neck, no rash, 2+ radial pulses, osft nontender abdomen, no LE edema, no calf tenderness  Imp: cervical radiculopathy, r/o MI  Plan: EKG, XR chest, trop x2, d/w cardio, muscle relaxant

## 2018-10-12 NOTE — ED PROVIDER NOTE - NS ED ROS FT
Constitutional: (-) fever  Eyes/ENT: (-) blurry vision, (-) epistaxis  Cardiovascular: (+) chest pain, (-) syncope  Respiratory: (-) cough, (-) shortness of breath  Gastrointestinal: (-) vomiting, (-) diarrhea  Musculoskeletal: (-) neck pain, (+) back pain, (-) joint pain  Integumentary: (-) rash, (-) edema  Neurological: (-) headache, (-) altered mental status  Psychiatric: (-) hallucinations  Allergic/Immunologic: (-) pruritus

## 2018-10-12 NOTE — ED ADULT TRIAGE NOTE - CHIEF COMPLAINT QUOTE
Sharp pain under left axilla, across left breast and left shoulder since tuesday (after she got flu shot).  Pain is worsening.

## 2019-03-06 ENCOUNTER — RESULT REVIEW (OUTPATIENT)
Age: 74
End: 2019-03-06

## 2019-07-13 ENCOUNTER — INPATIENT (INPATIENT)
Facility: HOSPITAL | Age: 74
LOS: 0 days | Discharge: HOME | End: 2019-07-14
Attending: INTERNAL MEDICINE | Admitting: INTERNAL MEDICINE
Payer: MEDICARE

## 2019-07-13 VITALS
OXYGEN SATURATION: 99 % | WEIGHT: 130.07 LBS | HEIGHT: 62 IN | DIASTOLIC BLOOD PRESSURE: 95 MMHG | HEART RATE: 107 BPM | TEMPERATURE: 98 F | SYSTOLIC BLOOD PRESSURE: 169 MMHG | RESPIRATION RATE: 18 BRPM

## 2019-07-13 DIAGNOSIS — Z98.890 OTHER SPECIFIED POSTPROCEDURAL STATES: Chronic | ICD-10-CM

## 2019-07-13 DIAGNOSIS — Z95.1 PRESENCE OF AORTOCORONARY BYPASS GRAFT: Chronic | ICD-10-CM

## 2019-07-13 DIAGNOSIS — Z87.19 PERSONAL HISTORY OF OTHER DISEASES OF THE DIGESTIVE SYSTEM: Chronic | ICD-10-CM

## 2019-07-13 DIAGNOSIS — Z96.631 PRESENCE OF RIGHT ARTIFICIAL WRIST JOINT: Chronic | ICD-10-CM

## 2019-07-13 PROBLEM — I21.9 ACUTE MYOCARDIAL INFARCTION, UNSPECIFIED: Chronic | Status: ACTIVE | Noted: 2018-10-12

## 2019-07-13 LAB
ALBUMIN SERPL ELPH-MCNC: 3.8 G/DL — SIGNIFICANT CHANGE UP (ref 3.5–5.2)
ALBUMIN SERPL ELPH-MCNC: 4.3 G/DL — SIGNIFICANT CHANGE UP (ref 3.5–5.2)
ALP SERPL-CCNC: 63 U/L — SIGNIFICANT CHANGE UP (ref 30–115)
ALP SERPL-CCNC: 72 U/L — SIGNIFICANT CHANGE UP (ref 30–115)
ALT FLD-CCNC: 18 U/L — SIGNIFICANT CHANGE UP (ref 0–41)
ALT FLD-CCNC: 21 U/L — SIGNIFICANT CHANGE UP (ref 0–41)
ANION GAP SERPL CALC-SCNC: 14 MMOL/L — SIGNIFICANT CHANGE UP (ref 7–14)
ANION GAP SERPL CALC-SCNC: 9 MMOL/L — SIGNIFICANT CHANGE UP (ref 7–14)
APTT BLD: 33.9 SEC — SIGNIFICANT CHANGE UP (ref 27–39.2)
AST SERPL-CCNC: 19 U/L — SIGNIFICANT CHANGE UP (ref 0–41)
AST SERPL-CCNC: 26 U/L — SIGNIFICANT CHANGE UP (ref 0–41)
BASOPHILS # BLD AUTO: 0.03 K/UL — SIGNIFICANT CHANGE UP (ref 0–0.2)
BASOPHILS # BLD AUTO: 0.06 K/UL — SIGNIFICANT CHANGE UP (ref 0–0.2)
BASOPHILS NFR BLD AUTO: 0.4 % — SIGNIFICANT CHANGE UP (ref 0–1)
BASOPHILS NFR BLD AUTO: 0.7 % — SIGNIFICANT CHANGE UP (ref 0–1)
BILIRUB SERPL-MCNC: 0.4 MG/DL — SIGNIFICANT CHANGE UP (ref 0.2–1.2)
BILIRUB SERPL-MCNC: 0.4 MG/DL — SIGNIFICANT CHANGE UP (ref 0.2–1.2)
BUN SERPL-MCNC: 23 MG/DL — HIGH (ref 10–20)
BUN SERPL-MCNC: 27 MG/DL — HIGH (ref 10–20)
CALCIUM SERPL-MCNC: 10.7 MG/DL — HIGH (ref 8.5–10.1)
CALCIUM SERPL-MCNC: 9.4 MG/DL — SIGNIFICANT CHANGE UP (ref 8.5–10.1)
CHLORIDE SERPL-SCNC: 90 MMOL/L — LOW (ref 98–110)
CHLORIDE SERPL-SCNC: 96 MMOL/L — LOW (ref 98–110)
CK MB CFR SERPL CALC: 7.5 NG/ML — HIGH (ref 0.6–6.3)
CK SERPL-CCNC: 260 U/L — HIGH (ref 0–225)
CO2 SERPL-SCNC: 32 MMOL/L — SIGNIFICANT CHANGE UP (ref 17–32)
CO2 SERPL-SCNC: 32 MMOL/L — SIGNIFICANT CHANGE UP (ref 17–32)
CREAT SERPL-MCNC: 1 MG/DL — SIGNIFICANT CHANGE UP (ref 0.7–1.5)
CREAT SERPL-MCNC: 1 MG/DL — SIGNIFICANT CHANGE UP (ref 0.7–1.5)
EOSINOPHIL # BLD AUTO: 0.03 K/UL — SIGNIFICANT CHANGE UP (ref 0–0.7)
EOSINOPHIL # BLD AUTO: 0.17 K/UL — SIGNIFICANT CHANGE UP (ref 0–0.7)
EOSINOPHIL NFR BLD AUTO: 0.4 % — SIGNIFICANT CHANGE UP (ref 0–8)
EOSINOPHIL NFR BLD AUTO: 1.9 % — SIGNIFICANT CHANGE UP (ref 0–8)
GLUCOSE SERPL-MCNC: 173 MG/DL — HIGH (ref 70–99)
GLUCOSE SERPL-MCNC: 260 MG/DL — HIGH (ref 70–99)
HCT VFR BLD CALC: 39 % — SIGNIFICANT CHANGE UP (ref 37–47)
HCT VFR BLD CALC: 43.9 % — SIGNIFICANT CHANGE UP (ref 37–47)
HGB BLD-MCNC: 13.3 G/DL — SIGNIFICANT CHANGE UP (ref 12–16)
HGB BLD-MCNC: 15.3 G/DL — SIGNIFICANT CHANGE UP (ref 12–16)
IMM GRANULOCYTES NFR BLD AUTO: 0.3 % — SIGNIFICANT CHANGE UP (ref 0.1–0.3)
IMM GRANULOCYTES NFR BLD AUTO: 0.5 % — HIGH (ref 0.1–0.3)
INR BLD: 0.89 RATIO — SIGNIFICANT CHANGE UP (ref 0.65–1.3)
LYMPHOCYTES # BLD AUTO: 1.08 K/UL — LOW (ref 1.2–3.4)
LYMPHOCYTES # BLD AUTO: 16 % — LOW (ref 20.5–51.1)
LYMPHOCYTES # BLD AUTO: 2.02 K/UL — SIGNIFICANT CHANGE UP (ref 1.2–3.4)
LYMPHOCYTES # BLD AUTO: 23.1 % — SIGNIFICANT CHANGE UP (ref 20.5–51.1)
MAGNESIUM SERPL-MCNC: 1.6 MG/DL — LOW (ref 1.8–2.4)
MAGNESIUM SERPL-MCNC: 2.1 MG/DL — SIGNIFICANT CHANGE UP (ref 1.8–2.4)
MCHC RBC-ENTMCNC: 29 PG — SIGNIFICANT CHANGE UP (ref 27–31)
MCHC RBC-ENTMCNC: 29.1 PG — SIGNIFICANT CHANGE UP (ref 27–31)
MCHC RBC-ENTMCNC: 34.1 G/DL — SIGNIFICANT CHANGE UP (ref 32–37)
MCHC RBC-ENTMCNC: 34.9 G/DL — SIGNIFICANT CHANGE UP (ref 32–37)
MCV RBC AUTO: 83.3 FL — SIGNIFICANT CHANGE UP (ref 81–99)
MCV RBC AUTO: 85.3 FL — SIGNIFICANT CHANGE UP (ref 81–99)
MONOCYTES # BLD AUTO: 0.44 K/UL — SIGNIFICANT CHANGE UP (ref 0.1–0.6)
MONOCYTES # BLD AUTO: 0.82 K/UL — HIGH (ref 0.1–0.6)
MONOCYTES NFR BLD AUTO: 6.5 % — SIGNIFICANT CHANGE UP (ref 1.7–9.3)
MONOCYTES NFR BLD AUTO: 9.4 % — HIGH (ref 1.7–9.3)
NEUTROPHILS # BLD AUTO: 5.15 K/UL — SIGNIFICANT CHANGE UP (ref 1.4–6.5)
NEUTROPHILS # BLD AUTO: 5.65 K/UL — SIGNIFICANT CHANGE UP (ref 1.4–6.5)
NEUTROPHILS NFR BLD AUTO: 64.4 % — SIGNIFICANT CHANGE UP (ref 42.2–75.2)
NEUTROPHILS NFR BLD AUTO: 76.4 % — HIGH (ref 42.2–75.2)
NRBC # BLD: 0 /100 WBCS — SIGNIFICANT CHANGE UP (ref 0–0)
NRBC # BLD: 0 /100 WBCS — SIGNIFICANT CHANGE UP (ref 0–0)
PHOSPHATE SERPL-MCNC: 2.8 MG/DL — SIGNIFICANT CHANGE UP (ref 2.1–4.9)
PLATELET # BLD AUTO: 232 K/UL — SIGNIFICANT CHANGE UP (ref 130–400)
PLATELET # BLD AUTO: 247 K/UL — SIGNIFICANT CHANGE UP (ref 130–400)
POTASSIUM SERPL-MCNC: 2.7 MMOL/L — CRITICAL LOW (ref 3.5–5)
POTASSIUM SERPL-MCNC: 4.3 MMOL/L — SIGNIFICANT CHANGE UP (ref 3.5–5)
POTASSIUM SERPL-SCNC: 2.7 MMOL/L — CRITICAL LOW (ref 3.5–5)
POTASSIUM SERPL-SCNC: 4.3 MMOL/L — SIGNIFICANT CHANGE UP (ref 3.5–5)
PROT SERPL-MCNC: 6.5 G/DL — SIGNIFICANT CHANGE UP (ref 6–8)
PROT SERPL-MCNC: 7.3 G/DL — SIGNIFICANT CHANGE UP (ref 6–8)
PROTHROM AB SERPL-ACNC: 10.3 SEC — SIGNIFICANT CHANGE UP (ref 9.95–12.87)
RBC # BLD: 4.57 M/UL — SIGNIFICANT CHANGE UP (ref 4.2–5.4)
RBC # BLD: 5.27 M/UL — SIGNIFICANT CHANGE UP (ref 4.2–5.4)
RBC # FLD: 12.7 % — SIGNIFICANT CHANGE UP (ref 11.5–14.5)
RBC # FLD: 13 % — SIGNIFICANT CHANGE UP (ref 11.5–14.5)
SODIUM SERPL-SCNC: 136 MMOL/L — SIGNIFICANT CHANGE UP (ref 135–146)
SODIUM SERPL-SCNC: 137 MMOL/L — SIGNIFICANT CHANGE UP (ref 135–146)
TROPONIN T SERPL-MCNC: <0.01 NG/ML — SIGNIFICANT CHANGE UP
WBC # BLD: 6.75 K/UL — SIGNIFICANT CHANGE UP (ref 4.8–10.8)
WBC # BLD: 8.76 K/UL — SIGNIFICANT CHANGE UP (ref 4.8–10.8)
WBC # FLD AUTO: 6.75 K/UL — SIGNIFICANT CHANGE UP (ref 4.8–10.8)
WBC # FLD AUTO: 8.76 K/UL — SIGNIFICANT CHANGE UP (ref 4.8–10.8)

## 2019-07-13 PROCEDURE — 99232 SBSQ HOSP IP/OBS MODERATE 35: CPT

## 2019-07-13 PROCEDURE — 99285 EMERGENCY DEPT VISIT HI MDM: CPT

## 2019-07-13 PROCEDURE — 71046 X-RAY EXAM CHEST 2 VIEWS: CPT | Mod: 26

## 2019-07-13 PROCEDURE — 99221 1ST HOSP IP/OBS SF/LOW 40: CPT

## 2019-07-13 PROCEDURE — 70450 CT HEAD/BRAIN W/O DYE: CPT | Mod: 26

## 2019-07-13 PROCEDURE — 71045 X-RAY EXAM CHEST 1 VIEW: CPT | Mod: 26

## 2019-07-13 RX ORDER — ASPIRIN/CALCIUM CARB/MAGNESIUM 324 MG
325 TABLET ORAL DAILY
Refills: 0 | Status: DISCONTINUED | OUTPATIENT
Start: 2019-07-13 | End: 2019-07-14

## 2019-07-13 RX ORDER — HYDROCHLOROTHIAZIDE 25 MG
12.5 TABLET ORAL DAILY
Refills: 0 | Status: DISCONTINUED | OUTPATIENT
Start: 2019-07-13 | End: 2019-07-14

## 2019-07-13 RX ORDER — ONDANSETRON 8 MG/1
4 TABLET, FILM COATED ORAL ONCE
Refills: 0 | Status: COMPLETED | OUTPATIENT
Start: 2019-07-13 | End: 2019-07-13

## 2019-07-13 RX ORDER — METOPROLOL TARTRATE 50 MG
1 TABLET ORAL
Qty: 0 | Refills: 0 | DISCHARGE

## 2019-07-13 RX ORDER — SODIUM CHLORIDE 9 MG/ML
250 INJECTION, SOLUTION INTRAVENOUS
Refills: 0 | Status: DISCONTINUED | OUTPATIENT
Start: 2019-07-13 | End: 2019-07-13

## 2019-07-13 RX ORDER — TRAMADOL HYDROCHLORIDE 50 MG/1
50 TABLET ORAL
Refills: 0 | Status: DISCONTINUED | OUTPATIENT
Start: 2019-07-13 | End: 2019-07-14

## 2019-07-13 RX ORDER — METOPROLOL TARTRATE 50 MG
25 TABLET ORAL
Refills: 0 | Status: DISCONTINUED | OUTPATIENT
Start: 2019-07-13 | End: 2019-07-14

## 2019-07-13 RX ORDER — POTASSIUM CHLORIDE 20 MEQ
20 PACKET (EA) ORAL ONCE
Refills: 0 | Status: COMPLETED | OUTPATIENT
Start: 2019-07-13 | End: 2019-07-13

## 2019-07-13 RX ORDER — ASPIRIN/CALCIUM CARB/MAGNESIUM 324 MG
325 TABLET ORAL ONCE
Refills: 0 | Status: COMPLETED | OUTPATIENT
Start: 2019-07-13 | End: 2019-07-13

## 2019-07-13 RX ORDER — SIMVASTATIN 20 MG/1
40 TABLET, FILM COATED ORAL AT BEDTIME
Refills: 0 | Status: DISCONTINUED | OUTPATIENT
Start: 2019-07-13 | End: 2019-07-14

## 2019-07-13 RX ORDER — POTASSIUM CHLORIDE 20 MEQ
40 PACKET (EA) ORAL ONCE
Refills: 0 | Status: COMPLETED | OUTPATIENT
Start: 2019-07-13 | End: 2019-07-13

## 2019-07-13 RX ORDER — LANOLIN ALCOHOL/MO/W.PET/CERES
10 CREAM (GRAM) TOPICAL AT BEDTIME
Refills: 0 | Status: DISCONTINUED | OUTPATIENT
Start: 2019-07-13 | End: 2019-07-14

## 2019-07-13 RX ORDER — ENOXAPARIN SODIUM 100 MG/ML
40 INJECTION SUBCUTANEOUS DAILY
Refills: 0 | Status: DISCONTINUED | OUTPATIENT
Start: 2019-07-13 | End: 2019-07-14

## 2019-07-13 RX ORDER — PANTOPRAZOLE SODIUM 20 MG/1
40 TABLET, DELAYED RELEASE ORAL
Refills: 0 | Status: DISCONTINUED | OUTPATIENT
Start: 2019-07-13 | End: 2019-07-14

## 2019-07-13 RX ORDER — TEMAZEPAM 15 MG/1
7.5 CAPSULE ORAL AT BEDTIME
Refills: 0 | Status: DISCONTINUED | OUTPATIENT
Start: 2019-07-13 | End: 2019-07-14

## 2019-07-13 RX ORDER — CLOPIDOGREL BISULFATE 75 MG/1
75 TABLET, FILM COATED ORAL DAILY
Refills: 0 | Status: DISCONTINUED | OUTPATIENT
Start: 2019-07-13 | End: 2019-07-14

## 2019-07-13 RX ORDER — SODIUM CHLORIDE 9 MG/ML
1000 INJECTION, SOLUTION INTRAVENOUS
Refills: 0 | Status: DISCONTINUED | OUTPATIENT
Start: 2019-07-13 | End: 2019-07-13

## 2019-07-13 RX ORDER — MAGNESIUM SULFATE 500 MG/ML
2 VIAL (ML) INJECTION ONCE
Refills: 0 | Status: COMPLETED | OUTPATIENT
Start: 2019-07-13 | End: 2019-07-13

## 2019-07-13 RX ORDER — GABAPENTIN 400 MG/1
600 CAPSULE ORAL DAILY
Refills: 0 | Status: DISCONTINUED | OUTPATIENT
Start: 2019-07-13 | End: 2019-07-14

## 2019-07-13 RX ORDER — CHLORHEXIDINE GLUCONATE 213 G/1000ML
1 SOLUTION TOPICAL
Refills: 0 | Status: DISCONTINUED | OUTPATIENT
Start: 2019-07-13 | End: 2019-07-14

## 2019-07-13 RX ADMIN — Medication 12.5 MILLIGRAM(S): at 12:44

## 2019-07-13 RX ADMIN — TRAMADOL HYDROCHLORIDE 50 MILLIGRAM(S): 50 TABLET ORAL at 10:14

## 2019-07-13 RX ADMIN — Medication 25 MILLIGRAM(S): at 17:24

## 2019-07-13 RX ADMIN — GABAPENTIN 600 MILLIGRAM(S): 400 CAPSULE ORAL at 12:44

## 2019-07-13 RX ADMIN — ENOXAPARIN SODIUM 40 MILLIGRAM(S): 100 INJECTION SUBCUTANEOUS at 12:42

## 2019-07-13 RX ADMIN — TRAMADOL HYDROCHLORIDE 50 MILLIGRAM(S): 50 TABLET ORAL at 13:21

## 2019-07-13 RX ADMIN — CHLORHEXIDINE GLUCONATE 1 APPLICATION(S): 213 SOLUTION TOPICAL at 17:28

## 2019-07-13 RX ADMIN — Medication 50 MILLIEQUIVALENT(S): at 06:48

## 2019-07-13 RX ADMIN — Medication 20 MILLIEQUIVALENT(S): at 11:56

## 2019-07-13 RX ADMIN — SIMVASTATIN 40 MILLIGRAM(S): 20 TABLET, FILM COATED ORAL at 21:34

## 2019-07-13 RX ADMIN — Medication 325 MILLIGRAM(S): at 12:45

## 2019-07-13 RX ADMIN — Medication 10 MILLIGRAM(S): at 22:56

## 2019-07-13 RX ADMIN — Medication 325 MILLIGRAM(S): at 05:45

## 2019-07-13 RX ADMIN — Medication 50 GRAM(S): at 06:32

## 2019-07-13 RX ADMIN — ONDANSETRON 4 MILLIGRAM(S): 8 TABLET, FILM COATED ORAL at 05:45

## 2019-07-13 RX ADMIN — Medication 40 MILLIEQUIVALENT(S): at 06:32

## 2019-07-13 RX ADMIN — CLOPIDOGREL BISULFATE 75 MILLIGRAM(S): 75 TABLET, FILM COATED ORAL at 12:41

## 2019-07-13 RX ADMIN — PANTOPRAZOLE SODIUM 40 MILLIGRAM(S): 20 TABLET, DELAYED RELEASE ORAL at 08:49

## 2019-07-13 NOTE — PROGRESS NOTE ADULT - ASSESSMENT
JAW PAIN/ N/ DIZZINESS/ ( SAME SYMPTOMS AS MI YEARS AGO)/ R.O ACS      PLAN:    CNS: avoid CNS depressant    HEENT:  Oral care    PULMONARY:  HOB @ 45 degrees, CXR    CARDIOVASCULAR: CE X2 Q 8H, CARDIO EVAL, ECHO, ASA/ PLAVIX/ B BLOCKER/ STATIN    GI: GI prophylaxis                                          Feeding PO    RENAL:  F/u  lytes.  Correct as needed. accurate I/O    INFECTIOUS DISEASE: NO ABX    HEMATOLOGICAL:  DVT prophylaxis.    ENDOCRINE:  Follow up FS.  Insulin protocol if needed.    MUSCULOSKELETAL:    CODE STATUS: FULL CODE    Dispo: Rule out MI- correct K  Will follow closely

## 2019-07-13 NOTE — ED PROVIDER NOTE - CARE PLAN
Principal Discharge DX:	Syncope  Secondary Diagnosis:	Jaw pain  Secondary Diagnosis:	Hypokalemia  Secondary Diagnosis:	Hypomagnesemia

## 2019-07-13 NOTE — ED PROVIDER NOTE - PHYSICAL EXAMINATION
CONSTITUTIONAL: Well-appearing; well-nourished; in no apparent distress.   EYES: PERRL; EOM intact.   ENT: normal nose; no rhinorrhea; normal pharynx with no tonsillar hypertrophy. TM nml b/l.    NECK: Supple; non-tender; . No JVD.   CARDIOVASCULAR: Normal S1, S2; no murmurs, rubs, or gallops.   RESPIRATORY: Normal chest excursion with respiration; breath sounds clear and equal bilaterally; no wheezes, rhonchi, or rales.  GI/: Normal bowel sounds; non-distended; non-tender; no palpable organomegaly.   MS: No calf swelling and tenderness.    SKIN: Normal for age and race; warm; dry; good turgor; no apparent lesions or exudate.   NEURO/PSYCH: A & O x 4; grossly unremarkable.

## 2019-07-13 NOTE — ED ADULT TRIAGE NOTE - CHIEF COMPLAINT QUOTE
PT c/o dizziness, L sided jaw pain. Pt also experiencing ear pain on the L side x 1 week.  Zofran 4mg given by EMS. PT c/o dizziness, L sided jaw pain. Pt states she got up from the toilet and fainted. Unsure if she hit her head. Pt also experiencing ear pain on the L side x 1 week.  Zofran 4mg given by EMS.

## 2019-07-13 NOTE — ED ADULT TRIAGE NOTE - TEMPERATURE IN FAHRENHEIT (DEGREES F)
Detail Level: Detailed Quality 110: Preventive Care And Screening: Influenza Immunization: Influenza Immunization Administered during Influenza season 97.6

## 2019-07-13 NOTE — H&P ADULT - ASSESSMENT
72yo F w/ PMH of HTN, MI, CAD s/p CABG in 2005 p/w L jaw pain that started at 2am.      IMPRESSION:        PLAN:    CNS:    HEENT:    PULMONARY:    CARDIOVASCULAR:    GI: GI prophylaxis.  Feeding     RENAL:    INFECTIOUS DISEASE:    HEMATOLOGICAL:  DVT prophylaxis.    ENDOCRINE:  Follow up FS.  Insulin protocol if needed.    MUSCULOSKELETAL:        CRITICAL CARE TIME SPENT: *** IMPRESSION: JAW PAIN/ N/ DIZZINESS/ ( SAME SYMPTOMS AS MI YEARS AGO)/ R.O ACS      PLAN:    CNS: avoid CNS depressant    HEENT:  Oral care    PULMONARY:  HOB @ 45 degrees, CXR    CARDIOVASCULAR: CE X2 Q 8H, CARDIO EVAL, ECHO, ASA/ PLAVIX/ B BLOCKER/ STATIN    GI: GI prophylaxis                                          Feeding PO    RENAL:  F/u  lytes.  Correct as needed. accurate I/O    INFECTIOUS DISEASE: NO ABX    HEMATOLOGICAL:  DVT prophylaxis.    ENDOCRINE:  Follow up FS.  Insulin protocol if needed.    MUSCULOSKELETAL:    CODE STATUS: FULL CODE    DISPOSITION: CCU  SPOKE AT LENGTH WITH PATIENT/ AT BEDSIDE

## 2019-07-13 NOTE — PROGRESS NOTE ADULT - SUBJECTIVE AND OBJECTIVE BOX
Chief Complaint:  Patient is a 73y old  Female who presents with a chief complaint of Jaw pain and Weakness (2019 10:33)      Interval Events:     Allergies:  penicillin (Hives)  tizanidine (Hives)      Home Medications:    Hospital Medications:  aspirin 325 milliGRAM(s) Oral daily  chlorhexidine 4% Liquid 1 Application(s) Topical two times a day  clopidogrel Tablet 75 milliGRAM(s) Oral daily  dextrose 5% 1000 milliLiter(s) IV Continuous <Continuous>  enoxaparin Injectable 40 milliGRAM(s) SubCutaneous daily  gabapentin 600 milliGRAM(s) Oral daily  hydrochlorothiazide 12.5 milliGRAM(s) Oral daily  metoprolol tartrate 25 milliGRAM(s) Oral two times a day  pantoprazole    Tablet 40 milliGRAM(s) Oral before breakfast  potassium chloride   Powder 20 milliEquivalent(s) Oral once  simvastatin 40 milliGRAM(s) Oral at bedtime  traMADol 50 milliGRAM(s) Oral two times a day PRN      PMHX/PSHX:  Heart attack  HTN (hypertension)  CAD (coronary artery disease)  Chronic midline back pain, unspecified back location  S/P CABG (coronary artery bypass graft)  History of appendectomy   delivery delivered  H/O appendicitis  History of right wrist replacement      Family history:  No pertinent family history in first degree relatives      ROS:   As mentioned below      PHYSICAL EXAM:   Vital Signs:  Vital Signs Last 24 Hrs  T(C): 35.6 (2019 08:12), Max: 36.4 (2019 04:38)  T(F): 96 (2019 08:12), Max: 97.6 (2019 04:38)  HR: 58 (2019 08:15) (58 - 107)  BP: 151/65 (2019 08:15) (137/63 - 169/95)  BP(mean): --  RR: 17 (2019 09:00) (17 - 18)  SpO2: 100% (2019 08:15) (97% - 100%)  Daily Height in cm: 157.48 (2019 08:12)    Daily     GENERAL:  NAD  HEENT:  NC/AT,  No Thyromegaly  CHEST:  CTA B/L  HEART:  S1, S2- No M, R, G  ABDOMEN:  Soft, NT, ND  EXTEREMITIES:  no cyanosis,clubbing or edema  SKIN:  NAD  NEURO:  Grossly Nr.    LABS:                        15.3   8.76  )-----------( 247      ( 2019 05:10 )             43.9         136  |  90<L>  |  27<H>  ----------------------------<  173<H>  2.7<LL>   |  32  |  1.0    Ca    10.7<H>      2019 05:10  Mg     1.6         TPro  7.3  /  Alb  4.3  /  TBili  0.4  /  DBili  x   /  AST  26  /  ALT  21  /  AlkPhos  72      LIVER FUNCTIONS - ( 2019 05:10 )  Alb: 4.3 g/dL / Pro: 7.3 g/dL / ALK PHOS: 72 U/L / ALT: 21 U/L / AST: 26 U/L / GGT: x           PT/INR - ( 2019 05:10 )   PT: 10.30 sec;   INR: 0.89 ratio         PTT - ( 2019 05:10 )  PTT:33.9 sec        Imaging:

## 2019-07-13 NOTE — ED PROVIDER NOTE - NS ED ROS FT
Constitutional: no fever, chills, no recent weight loss, change in appetite or malaise  Eyes: no redness/discharge/pain/vision changes  ENT: see HPI  Cardiac: see HPI  Respiratory: No cough or respiratory distress  GI: No nausea, vomiting, diarrhea or abdominal pain.  : No dysuria, frequency, urgency or hematuria  MS: no pain to back or extremities, no loss of ROM, no weakness  Neuro: No headache or weakness. No LOC.  Skin: No skin rash.  Endocrine: No history of thyroid disease or diabetes.

## 2019-07-13 NOTE — H&P ADULT - HISTORY OF PRESENT ILLNESS
72yo F w/ PMH of HTN, MI, CAD s/p CABG in 2005 p/w L jaw pain that started at 2am. Pt woke up from sleep due to L jaw pain. After waking up, pt went to bathroom, and when returned to bed, pt reports having generalized weakness w/ nausea, dizziness, and diaphoresis. Pt reports falling to floor due to dizziness, but denies syncope, LOC, or head trauma. As per , pt seems "out of place" when he found her on the floor, so brought pt to ED. In ED, pt was noted to have potassium of 2.7. Trop negative. CTH negative. Pt denies any fever/chills, CP, palpitation, SOB, cough, abd pain, diarrhea, swelling, or calf pain. Pt reports having L ear pain x 1 week, pending to see her PMD. Called to evaluate 72yo F w/ PMH of HTN, MI, CAD s/p CABG in 2005 p/w L jaw pain that started at 2am. Pt woke up from sleep due to L jaw pain. After waking up, pt went to bathroom, and when returned to bed, pt reports having generalized weakness w/ nausea, dizziness, and diaphoresis. Pt reports falling to floor due to dizziness, but denies syncope, LOC, or head trauma. As per , pt seems "out of place" when he found her on the floor, so brought pt to ED. In ED, pt was noted to have potassium of 2.7. Trop negative. CTH negative. Pt denies any fever/chills, CP, palpitation, SOB, cough, abd pain, diarrhea, swelling, or calf pain. Pt reports having L ear pain x 1 week, pending to see her PMD. Called to evaluate, all over pain lasted 10 min, as OP followed by cardio Dr Marte, last stress test 5 month ago, was given ASA, zofran in ED

## 2019-07-13 NOTE — ED ADULT NURSE NOTE - NSIMPLEMENTINTERV_GEN_ALL_ED
Implemented All Fall Risk Interventions:  [place] to call system. Call bell, personal items and telephone within reach. Instruct patient to call for assistance. Room bathroom lighting operational. Non-slip footwear when patient is off stretcher. Physically safe environment: no spills, clutter or unnecessary equipment. Stretcher in lowest position, wheels locked, appropriate side rails in place. Provide visual cue, wrist band, yellow gown, etc. Monitor gait and stability. Monitor for mental status changes and reorient to person, place, and time. Review medications for side effects contributing to fall risk. Reinforce activity limits and safety measures with patient and family.

## 2019-07-13 NOTE — H&P ADULT - NSHPPHYSICALEXAM_GEN_ALL_CORE
ICU Vital Signs Last 24 Hrs  T(C): 36.4 (13 Jul 2019 04:38), Max: 36.4 (13 Jul 2019 04:38)  T(F): 97.6 (13 Jul 2019 04:38), Max: 97.6 (13 Jul 2019 04:38)  HR: 107 (13 Jul 2019 04:38) (107 - 107)  BP: 169/95 (13 Jul 2019 04:38) (169/95 - 169/95)  RR: 18 (13 Jul 2019 04:38) (18 - 18)  SpO2: 99% (13 Jul 2019 04:38) (99% - 99%)        Physical Examination:    General: No acute distress.  Alert, oriented, interactive, nonfocal    HEENT: Pupils equal, reactive to light.  Symmetric.    PULM: Clear to auscultation bilaterally, no significant sputum production    CVS: Regular rate and rhythm, no murmurs, rubs, or gallops    ABD: Soft, nondistended, nontender, normoactive bowel sounds, no masses    EXT: No edema, nontender    SKIN: Warm and well perfused, no rashes noted.    Neuro: AAO X 4

## 2019-07-13 NOTE — CONSULT NOTE ADULT - SUBJECTIVE AND OBJECTIVE BOX
CARDIOLOGY CONSULT NOTE     CHIEF COMPLAINT/REASON FOR CONSULT:    HPI:  72yo F w/ PMH of HTN, MI, CAD s/p CABG in  p/w L jaw pain that started at 2am. Pt woke up from sleep due to L jaw pain. After waking up, pt went to bathroom, and when returned to bed, pt reports having generalized weakness w/ nausea, dizziness, and diaphoresis. Pt reports falling to floor due to dizziness, but denies syncope, LOC, or head trauma. As per , pt seems "out of place" when he found her on the floor, so brought pt to ED. In ED, pt was noted to have potassium of 2.7. Trop negative.  Pt denies any fever/chills, CP, palpitation, SOB, cough, abd pain, diarrhea, swelling, or calf pain. Pt reports having L ear pain x 1 week, pending to see her PMD. Called to evaluate, all over pain lasted 10 min,  last stress test 5 month ago, was given ASA, zofran in ED (2019 06:28)      PAST MEDICAL & SURGICAL HISTORY:  Heart attack  HTN (hypertension)  CAD (coronary artery disease)  Chronic midline back pain, unspecified back location  S/P CABG (coronary artery bypass graft)  History of appendectomy   delivery delivered  H/O appendicitis  History of right wrist replacement      Cardiac Risks:   [ x]HTN, [ ] DM, [ ] Smoking, [ ] FH,  [x ] Lipids        MEDICATIONS:  MEDICATIONS  (STANDING):  aspirin 325 milliGRAM(s) Oral daily  chlorhexidine 4% Liquid 1 Application(s) Topical two times a day  clopidogrel Tablet 75 milliGRAM(s) Oral daily  dextrose 5% 1000 milliLiter(s) (100 mL/Hr) IV Continuous <Continuous>  enoxaparin Injectable 40 milliGRAM(s) SubCutaneous daily  gabapentin 600 milliGRAM(s) Oral daily  hydrochlorothiazide 12.5 milliGRAM(s) Oral daily  metoprolol tartrate 25 milliGRAM(s) Oral two times a day  pantoprazole    Tablet 40 milliGRAM(s) Oral before breakfast  potassium chloride   Powder 20 milliEquivalent(s) Oral once  simvastatin 40 milliGRAM(s) Oral at bedtime      FAMILY HISTORY:  No pertinent family history in first degree relatives      SOCIAL HISTORY:      [ ] Marital status   Allergies    penicillin (Hives)  tizanidine (Hives)        	    REVIEW OF SYSTEMS:  CONSTITUTIONAL: No fever, weight loss, or fatigue  EYES: No eye pain, visual disturbances, or discharge  ENMT:  No difficulty hearing, tinnitus, vertigo; No sinus or throat pain  NECK: No pain or stiffness  RESPIRATORY: No cough, wheezing, chills or hemoptysis; No Shortness of Breath  CARDIOVASCULAR: No chest pain, palpitations, passing out, dizziness, or leg swelling  GASTROINTESTINAL: No abdominal or epigastric pain. No nausea, vomiting, or hematemesis; No diarrhea or constipation. No melena or hematochezia.  GENITOURINARY: No dysuria, frequency, hematuria, or incontinence  NEUROLOGICAL: No headaches, memory loss, loss of strength, numbness, or tremors  SKIN: No itching, burning, rashes, or lesions   	    [ ] All others negative	  [ ] Unable to obtain    PHYSICAL EXAM:  T(C): 35.6 (19 @ 08:12), Max: 36.4 (19 @ 04:38)  HR: 58 (19 @ 08:15) (58 - 107)  BP: 151/65 (19 @ 08:15) (137/63 - 169/95)  RR: 17 (19 @ 09:00) (17 - 18)  SpO2: 100% (19 @ 08:15) (97% - 100%)  Wt(kg): --  I&O's Summary      Appearance: Normal	  Psychiatry: A & O x 3, Mood & affect appropriate  HEENT:   Normal oral mucosa, PERRL, EOMI	  Lymphatic: No lymphadenopathy  Cardiovascular: Normal S1 S2,RRR, No JVD, No murmurs  Respiratory: Lungs clear to auscultation	  Gastrointestinal:  Soft, Non-tender, + BS	  Skin: No rashes, No ecchymoses, No cyanosis	  Neurologic: Non-focal  Extremities: Normal range of motion, No clubbing, cyanosis or edema  Vascular: Peripheral pulses palpable 2+ bilaterally      ECG:  	< from: 12 Lead ECG (19 @ 07:20) >    Diagnosis Line Normal sinus rhythm  Nonspecific ST-T changes  Confirmed by MILLER GATES MD ([id number]) on 2019 9:06:31 AM    < end of copied text >      	  LABS:	 	    CARDIAC MARKERS:                                    15.3   8.76  )-----------( 247      ( 2019 05:10 )             43.9         136  |  90<L>  |  27<H>  ----------------------------<  173<H>  2.7<LL>   |  32  |  1.0    Ca    10.7<H>      2019 05:10  Mg     1.6         TPro  7.3  /  Alb  4.3  /  TBili  0.4  /  DBili  x   /  AST  26  /  ALT  21  /  AlkPhos  72      PT/INR - ( 2019 05:10 )   PT: 10.30 sec;   INR: 0.89 ratio         PTT - ( 2019 05:10 )  PTT:33.9 sec

## 2019-07-13 NOTE — H&P ADULT - NSHPLABSRESULTS_GEN_ALL_CORE
I&O's Detail        LABS:                        15.3   8.76  )-----------( 247      ( 13 Jul 2019 05:10 )             43.9     13 Jul 2019 05:10    136    |  90     |  27     ----------------------------<  173    2.7     |  32     |  1.0      Ca    10.7       13 Jul 2019 05:10  Mg     1.6       13 Jul 2019 05:10    TPro  7.3    /  Alb  4.3    /  TBili  0.4    /  DBili  x      /  AST  26     /  ALT  21     /  AlkPhos  72     13 Jul 2019 05:10  Amylase x     lipase x          CARDIAC MARKERS ( 13 Jul 2019 05:10 )  x     / <0.01 ng/mL / 260 U/L / x     / 7.5 ng/mL      CAPILLARY BLOOD GLUCOSE        PT/INR - ( 13 Jul 2019 05:10 )   PT: 10.30 sec;   INR: 0.89 ratio         PTT - ( 13 Jul 2019 05:10 )  PTT:33.9 sec    Culture        MEDICATIONS  (STANDING):  magnesium sulfate  IVPB 2 Gram(s) IV Intermittent Once  potassium chloride   Powder 40 milliEquivalent(s) Oral Once  potassium chloride  20 mEq/100 mL IVPB 20 milliEquivalent(s) IV Intermittent once    MEDICATIONS  (PRN):        RADIOLOGY:     < from: CT Head No Cont (07.13.19 @ 05:29) >    IMPRESSION:     No acute intra-cranial pathology.      < end of copied text >

## 2019-07-13 NOTE — ED PROVIDER NOTE - ATTENDING CONTRIBUTION TO CARE
72 yo f hx of cad, cabg 2005, sts woke up in middle of the night with jaw pain, weakness, sat on toilet and syncopized, woke up feeling sweaty. sts she lowered herself to the ground.  found her laying on ground semi conscious. no cp or sob. no back pain. no ha.  +n, v. 1 week of left ear pain.   pt in nad, anict, pink conj, neck sup, ctab, rrr, ab soft, nt, nd. Alert and oriented, face symmetric and speech fluent. Strength 5/5 x 4 ext and symmetric, nml gross motor movement, nml RRM/CARLY/FTN, nml gait. No focal deficits noted.  will get ekg,labs, cxr.

## 2019-07-13 NOTE — CONSULT NOTE ADULT - ASSESSMENT
Patient with jaw pain different than MI. She had nausea vomiting and syncope. Possibly vasovagal. Need r/o mi . Correct k

## 2019-07-13 NOTE — H&P ADULT - NSICDXPASTMEDICALHX_GEN_ALL_CORE_FT
PAST MEDICAL HISTORY:  CAD (coronary artery disease)     Chronic midline back pain, unspecified back location     Heart attack     HTN (hypertension)

## 2019-07-13 NOTE — ED PROVIDER NOTE - CRITICAL CARE PROVIDED
additional history taking/consultation with other physicians/direct patient care (not related to procedure)/review history

## 2019-07-13 NOTE — ED PROVIDER NOTE - OBJECTIVE STATEMENT
72 yo female hx of HTN/ CAD/ MI s/p CABG in 2005 present c/o jaw pain with weakness/nausea and diaphoresis and syncope after she woke up and used the bathroom at home. as per patient, she woke up and used the bathroom and started feeling jaw pain/weakness and diaphoresis.  reported he found patient next to the toilet and she was unresponsive. Reported jaw pain is similar to the pain when she had MI in 2005. She didn't have chest pain when she had MI. Jaw pain resolved to arrival. Currently only complains of weakness and nausea.   Denies fever/chill/HA/chest pain/palpitation/sob/abd pain/n/v/d/ black stool/bloody stool/urinary sxs   also reported she had left ear pain x 1 week and pending to see her PMD.

## 2019-07-13 NOTE — ED ADULT NURSE NOTE - CHIEF COMPLAINT QUOTE
PT c/o dizziness, L sided jaw pain. Pt also experiencing ear pain on the L side x 1 week.  Zofran 4mg given by EMS.

## 2019-07-13 NOTE — PROGRESS NOTE ADULT - SUBJECTIVE AND OBJECTIVE BOX
ICU Attending Brief note   Patient was admitted this morning for jaw pain. Has H/O CABG 4 years ago. Examined this morning and was asymptomatic. 3 EKGs are unchanged and troponin did not increase. Downgraded from critical care to telemetry.

## 2019-07-13 NOTE — H&P ADULT - NSICDXPASTSURGICALHX_GEN_ALL_CORE_FT
PAST SURGICAL HISTORY:   delivery delivered     H/O appendicitis     History of appendectomy     History of right wrist replacement     S/P CABG (coronary artery bypass graft)

## 2019-07-14 ENCOUNTER — TRANSCRIPTION ENCOUNTER (OUTPATIENT)
Age: 74
End: 2019-07-14

## 2019-07-14 VITALS — HEART RATE: 54 BPM | OXYGEN SATURATION: 99 % | SYSTOLIC BLOOD PRESSURE: 187 MMHG | DIASTOLIC BLOOD PRESSURE: 74 MMHG

## 2019-07-14 LAB
ALBUMIN SERPL ELPH-MCNC: 4.1 G/DL — SIGNIFICANT CHANGE UP (ref 3.5–5.2)
ALP SERPL-CCNC: 64 U/L — SIGNIFICANT CHANGE UP (ref 30–115)
ALT FLD-CCNC: 19 U/L — SIGNIFICANT CHANGE UP (ref 0–41)
ANION GAP SERPL CALC-SCNC: 10 MMOL/L — SIGNIFICANT CHANGE UP (ref 7–14)
AST SERPL-CCNC: 19 U/L — SIGNIFICANT CHANGE UP (ref 0–41)
BILIRUB SERPL-MCNC: 0.4 MG/DL — SIGNIFICANT CHANGE UP (ref 0.2–1.2)
BUN SERPL-MCNC: 23 MG/DL — HIGH (ref 10–20)
CALCIUM SERPL-MCNC: 9.4 MG/DL — SIGNIFICANT CHANGE UP (ref 8.5–10.1)
CHLORIDE SERPL-SCNC: 98 MMOL/L — SIGNIFICANT CHANGE UP (ref 98–110)
CO2 SERPL-SCNC: 33 MMOL/L — HIGH (ref 17–32)
CREAT SERPL-MCNC: 0.9 MG/DL — SIGNIFICANT CHANGE UP (ref 0.7–1.5)
GLUCOSE SERPL-MCNC: 133 MG/DL — HIGH (ref 70–99)
HCT VFR BLD CALC: 41.2 % — SIGNIFICANT CHANGE UP (ref 37–47)
HGB BLD-MCNC: 13.9 G/DL — SIGNIFICANT CHANGE UP (ref 12–16)
MCHC RBC-ENTMCNC: 29.2 PG — SIGNIFICANT CHANGE UP (ref 27–31)
MCHC RBC-ENTMCNC: 33.7 G/DL — SIGNIFICANT CHANGE UP (ref 32–37)
MCV RBC AUTO: 86.6 FL — SIGNIFICANT CHANGE UP (ref 81–99)
NRBC # BLD: 0 /100 WBCS — SIGNIFICANT CHANGE UP (ref 0–0)
PLATELET # BLD AUTO: 224 K/UL — SIGNIFICANT CHANGE UP (ref 130–400)
POTASSIUM SERPL-MCNC: 4.2 MMOL/L — SIGNIFICANT CHANGE UP (ref 3.5–5)
POTASSIUM SERPL-SCNC: 4.2 MMOL/L — SIGNIFICANT CHANGE UP (ref 3.5–5)
PROT SERPL-MCNC: 7 G/DL — SIGNIFICANT CHANGE UP (ref 6–8)
RBC # BLD: 4.76 M/UL — SIGNIFICANT CHANGE UP (ref 4.2–5.4)
RBC # FLD: 12.9 % — SIGNIFICANT CHANGE UP (ref 11.5–14.5)
SODIUM SERPL-SCNC: 141 MMOL/L — SIGNIFICANT CHANGE UP (ref 135–146)
WBC # BLD: 8.97 K/UL — SIGNIFICANT CHANGE UP (ref 4.8–10.8)
WBC # FLD AUTO: 8.97 K/UL — SIGNIFICANT CHANGE UP (ref 4.8–10.8)

## 2019-07-14 PROCEDURE — 99232 SBSQ HOSP IP/OBS MODERATE 35: CPT

## 2019-07-14 RX ORDER — ASPIRIN/CALCIUM CARB/MAGNESIUM 324 MG
1 TABLET ORAL
Qty: 0 | Refills: 0 | DISCHARGE
Start: 2019-07-14

## 2019-07-14 RX ORDER — POTASSIUM CHLORIDE 20 MEQ
10 PACKET (EA) ORAL DAILY
Refills: 0 | Status: DISCONTINUED | OUTPATIENT
Start: 2019-07-14 | End: 2019-07-14

## 2019-07-14 RX ORDER — PANTOPRAZOLE SODIUM 20 MG/1
40 TABLET, DELAYED RELEASE ORAL
Refills: 0 | Status: DISCONTINUED | OUTPATIENT
Start: 2019-07-14 | End: 2019-07-14

## 2019-07-14 RX ORDER — ASPIRIN/CALCIUM CARB/MAGNESIUM 324 MG
325 TABLET ORAL DAILY
Refills: 0 | Status: DISCONTINUED | OUTPATIENT
Start: 2019-07-14 | End: 2019-07-14

## 2019-07-14 RX ORDER — PANTOPRAZOLE SODIUM 20 MG/1
1 TABLET, DELAYED RELEASE ORAL
Qty: 0 | Refills: 0 | DISCHARGE
Start: 2019-07-14

## 2019-07-14 RX ORDER — LANOLIN ALCOHOL/MO/W.PET/CERES
2 CREAM (GRAM) TOPICAL
Qty: 0 | Refills: 0 | DISCHARGE
Start: 2019-07-14

## 2019-07-14 RX ORDER — TRAMADOL HYDROCHLORIDE 50 MG/1
1 TABLET ORAL
Qty: 0 | Refills: 0 | DISCHARGE

## 2019-07-14 RX ORDER — POTASSIUM CHLORIDE 20 MEQ
1 PACKET (EA) ORAL
Qty: 20 | Refills: 0
Start: 2019-07-14 | End: 2019-08-02

## 2019-07-14 RX ORDER — POLYETHYLENE GLYCOL 3350 17 G/17G
17 POWDER, FOR SOLUTION ORAL ONCE
Refills: 0 | Status: COMPLETED | OUTPATIENT
Start: 2019-07-14 | End: 2019-07-14

## 2019-07-14 RX ORDER — DOCUSATE SODIUM 100 MG
100 CAPSULE ORAL
Refills: 0 | Status: DISCONTINUED | OUTPATIENT
Start: 2019-07-14 | End: 2019-07-14

## 2019-07-14 RX ORDER — SENNA PLUS 8.6 MG/1
2 TABLET ORAL AT BEDTIME
Refills: 0 | Status: DISCONTINUED | OUTPATIENT
Start: 2019-07-14 | End: 2019-07-14

## 2019-07-14 RX ADMIN — Medication 100 MILLIGRAM(S): at 10:41

## 2019-07-14 RX ADMIN — CHLORHEXIDINE GLUCONATE 1 APPLICATION(S): 213 SOLUTION TOPICAL at 06:44

## 2019-07-14 RX ADMIN — TRAMADOL HYDROCHLORIDE 50 MILLIGRAM(S): 50 TABLET ORAL at 09:16

## 2019-07-14 RX ADMIN — Medication 25 MILLIGRAM(S): at 06:44

## 2019-07-14 RX ADMIN — Medication 12.5 MILLIGRAM(S): at 06:44

## 2019-07-14 RX ADMIN — PANTOPRAZOLE SODIUM 40 MILLIGRAM(S): 20 TABLET, DELAYED RELEASE ORAL at 06:44

## 2019-07-14 RX ADMIN — TRAMADOL HYDROCHLORIDE 50 MILLIGRAM(S): 50 TABLET ORAL at 08:16

## 2019-07-14 RX ADMIN — POLYETHYLENE GLYCOL 3350 17 GRAM(S): 17 POWDER, FOR SOLUTION ORAL at 10:22

## 2019-07-14 NOTE — DISCHARGE NOTE PROVIDER - HOSPITAL COURSE
73y old  Female who presents with a chief complaint of Jaw pain and Weakness     ACS was ruled out    SHe feels much better, no pain all test unremarkable, K corrected    Patient is aware she will follow with Dr. Marte office in 1-2 week

## 2019-07-14 NOTE — DISCHARGE NOTE NURSING/CASE MANAGEMENT/SOCIAL WORK - NSDCDPATPORTLINK_GEN_ALL_CORE
You can access the [facility] Patient Portal, offered by [facility], by registering with the following website: [email or web address]

## 2019-07-14 NOTE — PROGRESS NOTE ADULT - SUBJECTIVE AND OBJECTIVE BOX
Patient is a 73y old  Female who presents with a chief complaint of Jaw pain and Weakness (13 Jul 2019 20:11)      T(F): 96.6 (07-13-19 @ 23:00), Max: 97.1 (07-13-19 @ 11:00)  HR: 54 (07-14-19 @ 06:44)  BP: 180/77 (07-14-19 @ 06:44)  RR: 16 (07-14-19 @ 04:00)  SpO2: 98% (07-14-19 @ 06:44) (95% - 100%)    PHYSICAL EXAM:  GENERAL: NAD, well-groomed, well-developed  HEAD:  Atraumatic, Normocephalic  EYES: EOMI, PERRLA, conjunctiva and sclera clear  ENMT: No tonsillar erythema, exudates, or enlargement; Moist mucous membranes, Good dentition, No lesions  NECK: Supple, No JVD, Normal thyroid  NERVOUS SYSTEM:  Alert & Oriented X3,  Motor Strength 5/5 B/L upper and lower extremities  CHEST/LUNG: Clear to percussion bilaterally; No rales, rhonchi, wheezing, or rubs  HEART: Regular rate and rhythm; No murmurs, rubs, or gallops  ABDOMEN: Soft, Nontender, Nondistended; Bowel sounds present  EXTREMITIES:   No clubbing, cyanosis, or edema  LYMPH: No lymphadenopathy noted  SKIN: No rashes or lesions    labs  07-13    137  |  96<L>  |  23<H>  ----------------------------<  260<H>  4.3   |  32  |  1.0    Ca    9.4      13 Jul 2019 11:31  Phos  2.8     07-13  Mg     2.1     07-13    TPro  6.5  /  Alb  3.8  /  TBili  0.4  /  DBili  x   /  AST  19  /  ALT  18  /  AlkPhos  63  07-13                          13.3   6.75  )-----------( 232      ( 13 Jul 2019 11:31 )             39.0       PT/INR - ( 13 Jul 2019 05:10 )   PT: 10.30 sec;   INR: 0.89 ratio         PTT - ( 13 Jul 2019 05:10 )  PTT:33.9 sec    Troponin T, Serum: <0.01 ng/mL (07-13-19 @ 18:54)  Troponin T, Serum: <0.01 ng/mL (07-13-19 @ 11:31)      aspirin 325 milliGRAM(s) Oral daily  chlorhexidine 4% Liquid 1 Application(s) Topical two times a day  clopidogrel Tablet 75 milliGRAM(s) Oral daily  enoxaparin Injectable 40 milliGRAM(s) SubCutaneous daily  gabapentin 600 milliGRAM(s) Oral daily  hydrochlorothiazide 12.5 milliGRAM(s) Oral daily  melatonin 10 milliGRAM(s) Oral at bedtime  metoprolol tartrate 25 milliGRAM(s) Oral two times a day  pantoprazole    Tablet 40 milliGRAM(s) Oral before breakfast  potassium chloride    Tablet ER 10 milliEquivalent(s) Oral daily  simvastatin 40 milliGRAM(s) Oral at bedtime  temazepam 7.5 milliGRAM(s) Oral at bedtime PRN  traMADol 50 milliGRAM(s) Oral two times a day PRN

## 2019-07-15 LAB
HCV AB S/CO SERPL IA: 0.11 S/CO — SIGNIFICANT CHANGE UP (ref 0–0.99)
HCV AB SERPL-IMP: SIGNIFICANT CHANGE UP

## 2019-07-18 DIAGNOSIS — I25.2 OLD MYOCARDIAL INFARCTION: ICD-10-CM

## 2019-07-18 DIAGNOSIS — Z95.1 PRESENCE OF AORTOCORONARY BYPASS GRAFT: ICD-10-CM

## 2019-07-18 DIAGNOSIS — I10 ESSENTIAL (PRIMARY) HYPERTENSION: ICD-10-CM

## 2019-07-18 DIAGNOSIS — R53.1 WEAKNESS: ICD-10-CM

## 2019-07-18 DIAGNOSIS — R55 SYNCOPE AND COLLAPSE: ICD-10-CM

## 2019-07-18 DIAGNOSIS — I25.10 ATHEROSCLEROTIC HEART DISEASE OF NATIVE CORONARY ARTERY WITHOUT ANGINA PECTORIS: ICD-10-CM

## 2019-07-18 DIAGNOSIS — E83.42 HYPOMAGNESEMIA: ICD-10-CM

## 2019-07-18 DIAGNOSIS — Z96.631 PRESENCE OF RIGHT ARTIFICIAL WRIST JOINT: ICD-10-CM

## 2019-07-18 DIAGNOSIS — R11.2 NAUSEA WITH VOMITING, UNSPECIFIED: ICD-10-CM

## 2019-07-18 DIAGNOSIS — E87.6 HYPOKALEMIA: ICD-10-CM

## 2019-07-18 DIAGNOSIS — R68.84 JAW PAIN: ICD-10-CM

## 2019-07-18 DIAGNOSIS — Z88.0 ALLERGY STATUS TO PENICILLIN: ICD-10-CM

## 2019-07-18 DIAGNOSIS — Z79.82 LONG TERM (CURRENT) USE OF ASPIRIN: ICD-10-CM

## 2019-07-20 NOTE — PATIENT PROFILE ADULT. - PROVIDER NOTIFICATION
CC:  Patient is a 55y old  Female who presents with a chief complaint of fever (20 Jul 2019 11:20)    SUBJECTIVE:     -no new complaints or issues at current time.    ROS:  all other review of systems are negative unless indicated above.    acetaminophen   Tablet .. 650 milliGRAM(s) Oral every 6 hours PRN  acetaminophen   Tablet .. 650 milliGRAM(s) Oral every 6 hours PRN  acetylcysteine 10%  Inhalation 3 milliLiter(s) Inhalation three times a day  ALBUTerol    0.083% 2.5 milliGRAM(s) Nebulizer every 4 hours  aluminum hydroxide/magnesium hydroxide/simethicone Suspension 30 milliLiter(s) Oral every 4 hours PRN  armodafinil 250 milliGRAM(s) Oral <User Schedule>  ascorbic acid 500 milliGRAM(s) Oral daily  aspirin enteric coated 81 milliGRAM(s) Oral daily  BACItracin   Ointment 1 Application(s) Topical two times a day  benzonatate 100 milliGRAM(s) Oral three times a day  buDESOnide 160 MICROgram(s)/formoterol 4.5 MICROgram(s) Inhaler 2 Puff(s) Inhalation two times a day  dexlansoprazole DR 60 milliGRAM(s) Oral daily  dextrose 40% Gel 15 Gram(s) Oral once PRN  dextrose 50% Injectable 12.5 Gram(s) IV Push once  dextrose 50% Injectable 25 Gram(s) IV Push once  dextrose 50% Injectable 25 Gram(s) IV Push once  docusate sodium 100 milliGRAM(s) Oral three times a day  enoxaparin Injectable 40 milliGRAM(s) SubCutaneous two times a day  ergocalciferol 27507 Unit(s) Oral <User Schedule>  famotidine    Tablet 20 milliGRAM(s) Oral at bedtime  folic acid 1 milliGRAM(s) Oral daily  furosemide    Tablet 40 milliGRAM(s) Oral daily  glucagon  Injectable 1 milliGRAM(s) IntraMuscular once PRN  guaiFENesin   Syrup  (Sugar-Free) 100 milliGRAM(s) Oral every 6 hours PRN  HYDROcodone/homatropine Syrup 5 milliLiter(s) Oral every 6 hours PRN  HYDROmorphone  Injectable 2 milliGRAM(s) IV Push every 3 hours PRN  hydrOXYzine hydrochloride 100 milliGRAM(s) Oral at bedtime  insulin glargine Injectable (LANTUS) 6 Unit(s) SubCutaneous at bedtime  insulin lispro (HumaLOG) corrective regimen sliding scale   SubCutaneous three times a day before meals  insulin lispro (HumaLOG) corrective regimen sliding scale   SubCutaneous at bedtime  lactobacillus acidophilus 1 Tablet(s) Oral two times a day  lamoTRIgine 200 milliGRAM(s) Oral daily  lamoTRIgine 100 milliGRAM(s) Oral at bedtime  levothyroxine 75 MICROGram(s) Oral daily  magnesium hydroxide Suspension 30 milliLiter(s) Oral daily  magnesium oxide 800 milliGRAM(s) Oral daily  methocarbamol 750 milliGRAM(s) Oral three times a day PRN  Methylnaltrexone 150 milliGRAM(s) 150 milliGRAM(s) Oral daily  methylPREDNISolone sodium succinate Injectable 15 milliGRAM(s) IV Push daily  metoclopramide 10 milliGRAM(s) Oral four times a day before meals  mirabegron ER 50 milliGRAM(s) Oral daily  montelukast 10 milliGRAM(s) Oral daily  multivitamin 1 Tablet(s) Oral daily  mupirocin 2% Ointment 1 Application(s) Topical two times a day PRN  nystatin Powder 1 Application(s) Topical every 8 hours  ondansetron   Disintegrating Tablet 4 milliGRAM(s) Oral <User Schedule>  ondansetron Injectable 4 milliGRAM(s) IV Push every 4 hours PRN  Plecanatide 3 milliGRAM(s) 3 milliGRAM(s) Oral daily  polyethylene glycol 3350 17 Gram(s) Oral <User Schedule>  QUEtiapine 100 milliGRAM(s) Oral two times a day  QUEtiapine 250 milliGRAM(s) Oral at bedtime  senna 2 Tablet(s) Oral at bedtime    T(C): 36.7 (07-20-19 @ 04:56), Max: 36.8 (07-19-19 @ 21:31)  HR: 82 (07-20-19 @ 08:32) (73 - 93)  BP: 128/72 (07-20-19 @ 06:10) (91/55 - 128/72)  RR: 18 (07-19-19 @ 21:31) (18 - 18)  SpO2: 98% (07-20-19 @ 04:56) (96% - 98%)    Constitutional: NAD.   HEENT: PERRL, EOMI, MMM.  Neck: Soft and supple, No carotid bruit, No JVD  Respiratory: (+) BL RHONCHI.  Cardiovascular: S1 and S2, regular rate and rhythm, no murmur, rub or gallop.  Gastrointestinal: Bowel Sounds present, soft, nontender, nondistended, no guarding, no rebound, no mass.  Extremities: No peripheral edema  Vascular: 2+ peripheral pulses  Neurological: A/O x 3, no focal deficits  Musculoskeletal: 5/5 strength b/l upper and lower extremities  Skin:  no visible rashes.     Impression:  55F.  admitted 06/28/2019.  presented to ED c/o fever.  a/w SOB, cough and wheezing.  in ED, treated for HCAP PN and COPD exacerbation.    PMHx:  HFpEF 60-65%;  AF-ablation;  lymphedema LE;  COPD-chronic respiratory failure;  AI;  CDI (1999);  morbid obesity-gastric bypass (2002);  hiatal hernia-repaired (07/2011);  GERD;  GIB (09/2012);  neurogenic bladder-SPT;  hypogammaglobulinemia (05/2016)-IVIG;  anemia;  chronic LBP;  spinal stenosis;  PCOS-TAHBSO (2002);  endometriosis;  peripheral neuropathy;  migraine HA;  manic depression;  schizoaffective d/o.    Assessment:  1.	acute upon chronic hypercapnic/hypoxemic respiratory failure.  2.	recurrent aspiration pneumonia.  3.	restrictive lung disease.  hx of COPD (by hx).  4.	mild acute upon chronic HFpEF exacerbation.  5.	hypogammaglobulinemia.  6.	gastroparesis/constipation.  suspect chronic opioid use contributing.  7.	neurogenic bladder-SPT (changed 07/18).  8.	LS DDD.  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  9.	polypharmacy.    Plan:  -O2 via NC.  BD nebs.  IV steroids.  chest PT and incentive spirometry.  unable to use BiPAP at night, apprehensive of aspiration.  -07/18 CXR, diffuse PVC.  additional dose of Lasix 20mg IV x 1 today.  -IV ABx.    -IVIG.  -07/19 UGIS, unrevealing.  -Reglan.  gastroparesis diet.  aspiration precautions.  will consider EGD to r/o stricuture.    -possible bariatric intervention if above does not resolve aspiration.  however, respiratory status must improve.  -DVT prophylaxis, LMWH.  -d/w RN.                          11.3   6.59  )-----------( 261      ( 20 Jul 2019 07:29 )             35.3       07-20    136  |  93<L>  |  5<L>  ----------------------------<  106<H>  3.4<L>   |  37<H>  |  0.56    Ca    9.6      20 Jul 2019 07:29 CC:  Patient is a 55y old  Female who presents with a chief complaint of fever (20 Jul 2019 11:20)    SUBJECTIVE:     -no new complaints or issues at current time.    ROS:  all other review of systems are negative unless indicated above.    acetaminophen   Tablet .. 650 milliGRAM(s) Oral every 6 hours PRN  acetaminophen   Tablet .. 650 milliGRAM(s) Oral every 6 hours PRN  acetylcysteine 10%  Inhalation 3 milliLiter(s) Inhalation three times a day  ALBUTerol    0.083% 2.5 milliGRAM(s) Nebulizer every 4 hours  aluminum hydroxide/magnesium hydroxide/simethicone Suspension 30 milliLiter(s) Oral every 4 hours PRN  armodafinil 250 milliGRAM(s) Oral <User Schedule>  ascorbic acid 500 milliGRAM(s) Oral daily  aspirin enteric coated 81 milliGRAM(s) Oral daily  BACItracin   Ointment 1 Application(s) Topical two times a day  benzonatate 100 milliGRAM(s) Oral three times a day  buDESOnide 160 MICROgram(s)/formoterol 4.5 MICROgram(s) Inhaler 2 Puff(s) Inhalation two times a day  dexlansoprazole DR 60 milliGRAM(s) Oral daily  dextrose 40% Gel 15 Gram(s) Oral once PRN  dextrose 50% Injectable 12.5 Gram(s) IV Push once  dextrose 50% Injectable 25 Gram(s) IV Push once  dextrose 50% Injectable 25 Gram(s) IV Push once  docusate sodium 100 milliGRAM(s) Oral three times a day  enoxaparin Injectable 40 milliGRAM(s) SubCutaneous two times a day  ergocalciferol 56733 Unit(s) Oral <User Schedule>  famotidine    Tablet 20 milliGRAM(s) Oral at bedtime  folic acid 1 milliGRAM(s) Oral daily  furosemide    Tablet 40 milliGRAM(s) Oral daily  glucagon  Injectable 1 milliGRAM(s) IntraMuscular once PRN  guaiFENesin   Syrup  (Sugar-Free) 100 milliGRAM(s) Oral every 6 hours PRN  HYDROcodone/homatropine Syrup 5 milliLiter(s) Oral every 6 hours PRN  HYDROmorphone  Injectable 2 milliGRAM(s) IV Push every 3 hours PRN  hydrOXYzine hydrochloride 100 milliGRAM(s) Oral at bedtime  insulin glargine Injectable (LANTUS) 6 Unit(s) SubCutaneous at bedtime  insulin lispro (HumaLOG) corrective regimen sliding scale   SubCutaneous three times a day before meals  insulin lispro (HumaLOG) corrective regimen sliding scale   SubCutaneous at bedtime  lactobacillus acidophilus 1 Tablet(s) Oral two times a day  lamoTRIgine 200 milliGRAM(s) Oral daily  lamoTRIgine 100 milliGRAM(s) Oral at bedtime  levothyroxine 75 MICROGram(s) Oral daily  magnesium hydroxide Suspension 30 milliLiter(s) Oral daily  magnesium oxide 800 milliGRAM(s) Oral daily  methocarbamol 750 milliGRAM(s) Oral three times a day PRN  Methylnaltrexone 150 milliGRAM(s) 150 milliGRAM(s) Oral daily  methylPREDNISolone sodium succinate Injectable 15 milliGRAM(s) IV Push daily  metoclopramide 10 milliGRAM(s) Oral four times a day before meals  mirabegron ER 50 milliGRAM(s) Oral daily  montelukast 10 milliGRAM(s) Oral daily  multivitamin 1 Tablet(s) Oral daily  mupirocin 2% Ointment 1 Application(s) Topical two times a day PRN  nystatin Powder 1 Application(s) Topical every 8 hours  ondansetron   Disintegrating Tablet 4 milliGRAM(s) Oral <User Schedule>  ondansetron Injectable 4 milliGRAM(s) IV Push every 4 hours PRN  Plecanatide 3 milliGRAM(s) 3 milliGRAM(s) Oral daily  polyethylene glycol 3350 17 Gram(s) Oral <User Schedule>  QUEtiapine 100 milliGRAM(s) Oral two times a day  QUEtiapine 250 milliGRAM(s) Oral at bedtime  senna 2 Tablet(s) Oral at bedtime    T(C): 36.7 (07-20-19 @ 04:56), Max: 36.8 (07-19-19 @ 21:31)  HR: 82 (07-20-19 @ 08:32) (73 - 93)  BP: 128/72 (07-20-19 @ 06:10) (91/55 - 128/72)  RR: 18 (07-19-19 @ 21:31) (18 - 18)  SpO2: 98% (07-20-19 @ 04:56) (96% - 98%)    Constitutional: NAD.   HEENT: PERRL, EOMI, MMM.  Neck: Soft and supple, No carotid bruit, No JVD  Respiratory: (+) BL RHONCHI.  Cardiovascular: S1 and S2, regular rate and rhythm, no murmur, rub or gallop.  Gastrointestinal: Bowel Sounds present, soft, nontender, nondistended, no guarding, no rebound, no mass.  Extremities: No peripheral edema  Vascular: 2+ peripheral pulses  Neurological: A/O x 3, no focal deficits  Musculoskeletal: 5/5 strength b/l upper and lower extremities  Skin:  no visible rashes.                        11.3   6.59  )-----------( 261      ( 20 Jul 2019 07:29 )             35.3       07-20    136  |  93<L>  |  5<L>  ----------------------------<  106<H>  3.4<L>   |  37<H>  |  0.56    Ca    9.6      20 Jul 2019 07:29    Impression:  55F.  admitted 06/28/2019.  presented to ED c/o fever.  a/w SOB, cough and wheezing.  in ED, treated for HCAP PN and COPD exacerbation.    PMHx:  HFpEF 60-65%;  AF-ablation;  lymphedema LE;  COPD-chronic respiratory failure;  AI;  CDI (1999);  morbid ivuqxjn-Riff-xp-Y gastric bypass (2002);  hiatal hernia-repaired (07/2011);  GERD;  GIB (09/2012);  neurogenic bladder-SPT;  hypogammaglobulinemia (05/2016)-IVIG;  anemia;  chronic LBP;  spinal stenosis;  PCOS-TAHBSO (2002);  endometriosis;  peripheral neuropathy;  migraine HA;  manic depression;  schizoaffective d/o.    Assessment:  1.	acute upon chronic hypercapnic/hypoxemic respiratory failure.  2.	recurrent aspiration pneumonia.  3.	restrictive lung disease.  hx of COPD (by hx).  4.	mild acute upon chronic HFpEF exacerbation.  5.	hypogammaglobulinemia.  6.	gastroparesis/constipation.  suspect chronic opioid use contributing.  7.	neurogenic bladder-SPT (changed 07/18).  8.	LS DDD.  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  9.	polypharmacy.    Plan:  -O2 via NC.  BD nebs.  IV steroids.  chest PT and incentive spirometry.  unable to use BiPAP at night, apprehensive of aspiration.  -07/18 CXR, diffuse PVC.  additional dose of Lasix 20mg IV x 1 today.  -IV ABx.    -IVIG.  -07/19 UGIS, unremarkable.  no evidence of leak, ulceration or stricture..  -Reglan.  gastroparesis diet.  aspiration precautions.  will consider EGD to r/o stricuture.    -possible bariatric intervention if above does not resolve aspiration.  however, respiratory status must improve.  -DVT prophylaxis, LMWH.  -d/w RN. Declines

## 2020-02-01 ENCOUNTER — EMERGENCY (EMERGENCY)
Facility: HOSPITAL | Age: 75
LOS: 0 days | Discharge: HOME | End: 2020-02-01
Attending: EMERGENCY MEDICINE | Admitting: EMERGENCY MEDICINE
Payer: MEDICARE

## 2020-02-01 VITALS
RESPIRATION RATE: 18 BRPM | OXYGEN SATURATION: 96 % | TEMPERATURE: 97 F | DIASTOLIC BLOOD PRESSURE: 72 MMHG | HEART RATE: 61 BPM | SYSTOLIC BLOOD PRESSURE: 165 MMHG

## 2020-02-01 VITALS
TEMPERATURE: 98 F | OXYGEN SATURATION: 99 % | HEART RATE: 82 BPM | DIASTOLIC BLOOD PRESSURE: 86 MMHG | SYSTOLIC BLOOD PRESSURE: 174 MMHG | RESPIRATION RATE: 18 BRPM

## 2020-02-01 DIAGNOSIS — I10 ESSENTIAL (PRIMARY) HYPERTENSION: ICD-10-CM

## 2020-02-01 DIAGNOSIS — Z88.0 ALLERGY STATUS TO PENICILLIN: ICD-10-CM

## 2020-02-01 DIAGNOSIS — Z96.631 PRESENCE OF RIGHT ARTIFICIAL WRIST JOINT: Chronic | ICD-10-CM

## 2020-02-01 DIAGNOSIS — R07.9 CHEST PAIN, UNSPECIFIED: ICD-10-CM

## 2020-02-01 DIAGNOSIS — Z87.19 PERSONAL HISTORY OF OTHER DISEASES OF THE DIGESTIVE SYSTEM: Chronic | ICD-10-CM

## 2020-02-01 DIAGNOSIS — Z98.890 OTHER SPECIFIED POSTPROCEDURAL STATES: Chronic | ICD-10-CM

## 2020-02-01 DIAGNOSIS — Z88.1 ALLERGY STATUS TO OTHER ANTIBIOTIC AGENTS STATUS: ICD-10-CM

## 2020-02-01 DIAGNOSIS — Z95.1 PRESENCE OF AORTOCORONARY BYPASS GRAFT: Chronic | ICD-10-CM

## 2020-02-01 DIAGNOSIS — Z95.1 PRESENCE OF AORTOCORONARY BYPASS GRAFT: ICD-10-CM

## 2020-02-01 LAB
ALBUMIN SERPL ELPH-MCNC: 4.6 G/DL — SIGNIFICANT CHANGE UP (ref 3.5–5.2)
ALP SERPL-CCNC: 71 U/L — SIGNIFICANT CHANGE UP (ref 30–115)
ALT FLD-CCNC: 24 U/L — SIGNIFICANT CHANGE UP (ref 0–41)
ANION GAP SERPL CALC-SCNC: 12 MMOL/L — SIGNIFICANT CHANGE UP (ref 7–14)
AST SERPL-CCNC: 29 U/L — SIGNIFICANT CHANGE UP (ref 0–41)
BASOPHILS # BLD AUTO: 0.04 K/UL — SIGNIFICANT CHANGE UP (ref 0–0.2)
BASOPHILS NFR BLD AUTO: 0.5 % — SIGNIFICANT CHANGE UP (ref 0–1)
BILIRUB SERPL-MCNC: 0.6 MG/DL — SIGNIFICANT CHANGE UP (ref 0.2–1.2)
BUN SERPL-MCNC: 25 MG/DL — HIGH (ref 10–20)
CALCIUM SERPL-MCNC: 10.1 MG/DL — SIGNIFICANT CHANGE UP (ref 8.5–10.1)
CHLORIDE SERPL-SCNC: 96 MMOL/L — LOW (ref 98–110)
CO2 SERPL-SCNC: 30 MMOL/L — SIGNIFICANT CHANGE UP (ref 17–32)
CREAT SERPL-MCNC: 0.9 MG/DL — SIGNIFICANT CHANGE UP (ref 0.7–1.5)
EOSINOPHIL # BLD AUTO: 0.1 K/UL — SIGNIFICANT CHANGE UP (ref 0–0.7)
EOSINOPHIL NFR BLD AUTO: 1.2 % — SIGNIFICANT CHANGE UP (ref 0–8)
GLUCOSE SERPL-MCNC: 126 MG/DL — HIGH (ref 70–99)
HCT VFR BLD CALC: 45.6 % — SIGNIFICANT CHANGE UP (ref 37–47)
HGB BLD-MCNC: 15.7 G/DL — SIGNIFICANT CHANGE UP (ref 12–16)
IMM GRANULOCYTES NFR BLD AUTO: 0.2 % — SIGNIFICANT CHANGE UP (ref 0.1–0.3)
LYMPHOCYTES # BLD AUTO: 1.63 K/UL — SIGNIFICANT CHANGE UP (ref 1.2–3.4)
LYMPHOCYTES # BLD AUTO: 19.5 % — LOW (ref 20.5–51.1)
MCHC RBC-ENTMCNC: 29.4 PG — SIGNIFICANT CHANGE UP (ref 27–31)
MCHC RBC-ENTMCNC: 34.4 G/DL — SIGNIFICANT CHANGE UP (ref 32–37)
MCV RBC AUTO: 85.4 FL — SIGNIFICANT CHANGE UP (ref 81–99)
MONOCYTES # BLD AUTO: 0.69 K/UL — HIGH (ref 0.1–0.6)
MONOCYTES NFR BLD AUTO: 8.3 % — SIGNIFICANT CHANGE UP (ref 1.7–9.3)
NEUTROPHILS # BLD AUTO: 5.87 K/UL — SIGNIFICANT CHANGE UP (ref 1.4–6.5)
NEUTROPHILS NFR BLD AUTO: 70.3 % — SIGNIFICANT CHANGE UP (ref 42.2–75.2)
NRBC # BLD: 0 /100 WBCS — SIGNIFICANT CHANGE UP (ref 0–0)
NT-PROBNP SERPL-SCNC: 726 PG/ML — HIGH (ref 0–300)
PLATELET # BLD AUTO: 269 K/UL — SIGNIFICANT CHANGE UP (ref 130–400)
POTASSIUM SERPL-MCNC: 4 MMOL/L — SIGNIFICANT CHANGE UP (ref 3.5–5)
POTASSIUM SERPL-SCNC: 4 MMOL/L — SIGNIFICANT CHANGE UP (ref 3.5–5)
PROT SERPL-MCNC: 7.7 G/DL — SIGNIFICANT CHANGE UP (ref 6–8)
RBC # BLD: 5.34 M/UL — SIGNIFICANT CHANGE UP (ref 4.2–5.4)
RBC # FLD: 12.7 % — SIGNIFICANT CHANGE UP (ref 11.5–14.5)
SODIUM SERPL-SCNC: 138 MMOL/L — SIGNIFICANT CHANGE UP (ref 135–146)
TROPONIN T SERPL-MCNC: <0.01 NG/ML — SIGNIFICANT CHANGE UP
TROPONIN T SERPL-MCNC: <0.01 NG/ML — SIGNIFICANT CHANGE UP
WBC # BLD: 8.35 K/UL — SIGNIFICANT CHANGE UP (ref 4.8–10.8)
WBC # FLD AUTO: 8.35 K/UL — SIGNIFICANT CHANGE UP (ref 4.8–10.8)

## 2020-02-01 PROCEDURE — 71045 X-RAY EXAM CHEST 1 VIEW: CPT | Mod: 26

## 2020-02-01 PROCEDURE — 70450 CT HEAD/BRAIN W/O DYE: CPT | Mod: 26

## 2020-02-01 PROCEDURE — 99285 EMERGENCY DEPT VISIT HI MDM: CPT

## 2020-02-01 RX ORDER — METOPROLOL TARTRATE 50 MG
1 TABLET ORAL
Qty: 0 | Refills: 0 | DISCHARGE

## 2020-02-01 RX ORDER — RISEDRONATE SODIUM 25.8; 4.2 MG/1; MG/1
1 TABLET, FILM COATED ORAL
Qty: 0 | Refills: 0 | DISCHARGE

## 2020-02-01 RX ORDER — METHOCARBAMOL 500 MG/1
0 TABLET, FILM COATED ORAL
Qty: 0 | Refills: 0 | DISCHARGE

## 2020-02-01 RX ORDER — VALSARTAN 80 MG/1
1 TABLET ORAL
Qty: 0 | Refills: 0 | DISCHARGE

## 2020-02-01 RX ORDER — TRAMADOL HYDROCHLORIDE 50 MG/1
0 TABLET ORAL
Qty: 0 | Refills: 0 | DISCHARGE

## 2020-02-01 RX ORDER — SIMVASTATIN 20 MG/1
1 TABLET, FILM COATED ORAL
Qty: 0 | Refills: 0 | DISCHARGE

## 2020-02-01 RX ORDER — ASPIRIN/CALCIUM CARB/MAGNESIUM 324 MG
325 TABLET ORAL ONCE
Refills: 0 | Status: COMPLETED | OUTPATIENT
Start: 2020-02-01 | End: 2020-02-01

## 2020-02-01 RX ORDER — ACETAMINOPHEN 500 MG
650 TABLET ORAL ONCE
Refills: 0 | Status: COMPLETED | OUTPATIENT
Start: 2020-02-01 | End: 2020-02-01

## 2020-02-01 RX ORDER — GABAPENTIN 400 MG/1
1 CAPSULE ORAL
Qty: 0 | Refills: 0 | DISCHARGE

## 2020-02-01 RX ORDER — RAMELTEON 8 MG
1 TABLET ORAL
Qty: 0 | Refills: 0 | DISCHARGE

## 2020-02-01 RX ADMIN — Medication 325 MILLIGRAM(S): at 16:54

## 2020-02-01 RX ADMIN — Medication 650 MILLIGRAM(S): at 11:55

## 2020-02-01 NOTE — ED PROVIDER NOTE - CLINICAL SUMMARY MEDICAL DECISION MAKING FREE TEXT BOX
Patient presents for evaluation of chest pain as well as hypertension we obtained ekg, labs including troponin, based on her history we consulted cardiology dr loyd who evaluated patient in the ED and advises multiple sets of troponin with further monitoring discharge if 2 sets of troponin were negative and patient improved.  patient improved headache, dissipated, bp improved w/o treatment.  2 sets of troponin were negative we discussed admission with patient and dr reyes advised to discharge for further workup and potential provacative testing as an outpatient we discussed indications to return at [facility]

## 2020-02-01 NOTE — ED PROVIDER NOTE - ATTENDING CONTRIBUTION TO CARE
I was present for and supervised the key and critical aspects of the procedures performed during the care of the patient. Patient presents for evaluation of chest pain, that started at rest, she denies any sob as well as fevers or chill she denies any palpitations.  she notes a posterior headache that is throbbing in nature found to have elevation in bp   On physical exam she is nc/at perrla eomi oropharyn xclear cta b/l, rrr s1s2 noted abd-soft nt bs + ext from with no focal deficits   A/P- we obtained ekg, labs and have consulted dr reyes I will continue to monitor at this time

## 2020-02-01 NOTE — ED PROVIDER NOTE - PATIENT PORTAL LINK FT
You can access the FollowMyHealth Patient Portal offered by [facility] by registering at the following website: [email or web address]. By joining [facility]’s FollowMyHealth portal, you will also be able to view your health information using other applications (apps) compatible with our system.

## 2020-02-01 NOTE — ED PROVIDER NOTE - PROVIDER TOKENS
PROVIDER:[TOKEN:[[id number]],FOLLOWUP:[1-3 days]],FREE:[LAST:[your pmd],PHONE:[(   )    -],FAX:[(   )    -],FOLLOWUP:[1-3 days]]

## 2020-02-01 NOTE — ED PROVIDER NOTE - NSFOLLOWUPINSTRUCTIONS_ED_ALL_ED_FT
Hypertension    Hypertension, commonly called high blood pressure, is when the force of blood pumping through your arteries is too strong. Hypertension forces your heart to work harder to pump blood. Your arteries may become narrow or stiff. Having untreated or uncontrolled hypertension for a long period of time can cause heart attack, stroke, kidney disease, and other problems. If started on a medication, take exactly as prescribed by your health care professional. Maintain a healthy lifestyle and follow up with your primary care physician.    SEEK IMMEDIATE MEDICAL CARE IF YOU HAVE ANY OF THE FOLLOWING SYMPTOMS: severe headache, confusion, chest pain, abdominal pain, vomiting, or shortness of breath.     Chest Pain    Chest pain can be caused by many different conditions which may or may not be dangerous. Causes include heartburn, lung infections, heart attack, blood clot in lungs, skin infections, strain or damage to muscle, cartilage, or bones, etc. In addition to a history and physical examination, an electrocardiogram (ECG) or other lab tests may have been performed to determine the cause of your chest pain. Follow up with your primary care provider or with a cardiologist as instructed.     SEEK IMMEDIATE MEDICAL CARE IF YOU HAVE ANY OF THE FOLLOWING SYMPTOMS: worsening chest pain, coughing up blood, unexplained back/neck/jaw pain, severe abdominal pain, dizziness or lightheadedness, fainting, shortness of breath, sweaty or clammy skin, vomiting, or racing heart beat. These symptoms may represent a serious problem that is an emergency. Do not wait to see if the symptoms will go away. Get medical help right away. Call 911 and do not drive yourself to the hospital.

## 2020-02-01 NOTE — ED PROVIDER NOTE - NS ED ROS FT
Constitutional: (-) fever, (-) chills  Eyes: (-) visual changes  ENT: (-) nasal congestions  Cardiovascular: (+) chest pain, (-) syncope  Respiratory: (-) cough, (-) shortness of breath, (-) dyspnea,   Gastrointestinal: (-) vomiting, (-) diarrhea, (-)nausea,  Musculoskeletal: (-) neck pain, (-) back pain, (-) joint pain,  Integumentary: (-) rash, (-) edema, (-) bruises  Neurological: (+) headache, (-) loc, (-) dizziness, (-) tingling, (-)numbness,  Peripheral Vascular: (-) leg swelling  :  (-)dysuria,  (-) hematuria  Allergic/Immunologic: (-) pruritus

## 2020-02-01 NOTE — ED PROVIDER NOTE - PROGRESS NOTE DETAILS
seen by dr diggs in ed, if 2 set normal, rec dc. ekg changes compared to old, pt still not feeling well, will admit, mckenna aware. spoke with dr diggs, advised no inpt testing indiacted for pt. pt  given strict retrn precautions verbalizes understanding. Results d/w patient and family and copies of results provided.  Pt instructed to return if any worsening symptoms or concerns.  They verbalize understanding.

## 2020-02-01 NOTE — ED PROVIDER NOTE - CARE PLAN
Principal Discharge DX:	Chest pain  Secondary Diagnosis:	Headache  Secondary Diagnosis:	Abnormal EKG Principal Discharge DX:	HTN (hypertension)  Secondary Diagnosis:	Chest pain

## 2020-02-01 NOTE — ED PROVIDER NOTE - CARE PROVIDER_API CALL
Faustino Marte)  Cardiovascular Disease; Internal Medicine  [address] [place] [id number]  Phone: [phone]  Fax: [phone]  Follow Up Time: 1-3 days    your pmd,   Phone: (   )    -  Fax: (   )    -  Follow Up Time: 1-3 days

## 2020-08-07 ENCOUNTER — INPATIENT (INPATIENT)
Facility: HOSPITAL | Age: 75
LOS: 6 days | Discharge: HOME | End: 2020-08-14
Attending: INTERNAL MEDICINE | Admitting: INTERNAL MEDICINE
Payer: MEDICARE

## 2020-08-07 VITALS
RESPIRATION RATE: 18 BRPM | OXYGEN SATURATION: 96 % | SYSTOLIC BLOOD PRESSURE: 86 MMHG | TEMPERATURE: 98 F | HEART RATE: 122 BPM | DIASTOLIC BLOOD PRESSURE: 49 MMHG

## 2020-08-07 DIAGNOSIS — Z95.1 PRESENCE OF AORTOCORONARY BYPASS GRAFT: Chronic | ICD-10-CM

## 2020-08-07 DIAGNOSIS — Z96.631 PRESENCE OF RIGHT ARTIFICIAL WRIST JOINT: Chronic | ICD-10-CM

## 2020-08-07 DIAGNOSIS — Z87.19 PERSONAL HISTORY OF OTHER DISEASES OF THE DIGESTIVE SYSTEM: Chronic | ICD-10-CM

## 2020-08-07 DIAGNOSIS — Z98.890 OTHER SPECIFIED POSTPROCEDURAL STATES: Chronic | ICD-10-CM

## 2020-08-07 LAB
ALBUMIN SERPL ELPH-MCNC: 3.7 G/DL — SIGNIFICANT CHANGE UP (ref 3.5–5.2)
ALP SERPL-CCNC: 60 U/L — SIGNIFICANT CHANGE UP (ref 30–115)
ALT FLD-CCNC: 73 U/L — HIGH (ref 0–41)
ANION GAP SERPL CALC-SCNC: 18 MMOL/L — HIGH (ref 7–14)
ANISOCYTOSIS BLD QL: SLIGHT — SIGNIFICANT CHANGE UP
APPEARANCE UR: CLEAR — SIGNIFICANT CHANGE UP
APTT BLD: 26 SEC — LOW (ref 27–39.2)
AST SERPL-CCNC: 128 U/L — HIGH (ref 0–41)
BASE EXCESS BLDV CALC-SCNC: 4 MMOL/L — HIGH (ref -2–2)
BASOPHILS # BLD AUTO: 0 K/UL — SIGNIFICANT CHANGE UP (ref 0–0.2)
BASOPHILS NFR BLD AUTO: 0 % — SIGNIFICANT CHANGE UP (ref 0–1)
BILIRUB SERPL-MCNC: 0.5 MG/DL — SIGNIFICANT CHANGE UP (ref 0.2–1.2)
BILIRUB UR-MCNC: NEGATIVE — SIGNIFICANT CHANGE UP
BUN SERPL-MCNC: 30 MG/DL — HIGH (ref 10–20)
CA-I SERPL-SCNC: 1.08 MMOL/L — LOW (ref 1.12–1.3)
CALCIUM SERPL-MCNC: 8.8 MG/DL — SIGNIFICANT CHANGE UP (ref 8.5–10.1)
CHLORIDE SERPL-SCNC: 92 MMOL/L — LOW (ref 98–110)
CO2 SERPL-SCNC: 26 MMOL/L — SIGNIFICANT CHANGE UP (ref 17–32)
COLOR SPEC: SIGNIFICANT CHANGE UP
CREAT SERPL-MCNC: 1.6 MG/DL — HIGH (ref 0.7–1.5)
DIFF PNL FLD: NEGATIVE — SIGNIFICANT CHANGE UP
EOSINOPHIL # BLD AUTO: 0 K/UL — SIGNIFICANT CHANGE UP (ref 0–0.7)
EOSINOPHIL NFR BLD AUTO: 0 % — SIGNIFICANT CHANGE UP (ref 0–8)
GAS PNL BLDV: 137 MMOL/L — SIGNIFICANT CHANGE UP (ref 136–145)
GAS PNL BLDV: SIGNIFICANT CHANGE UP
GLUCOSE SERPL-MCNC: 160 MG/DL — HIGH (ref 70–99)
GLUCOSE UR QL: ABNORMAL
HCO3 BLDV-SCNC: 29 MMOL/L — SIGNIFICANT CHANGE UP (ref 22–29)
HCT VFR BLD CALC: 34.3 % — LOW (ref 37–47)
HCT VFR BLDA CALC: 44.3 % — HIGH (ref 34–44)
HGB BLD CALC-MCNC: 14.4 G/DL — SIGNIFICANT CHANGE UP (ref 14–18)
HGB BLD-MCNC: 11.9 G/DL — LOW (ref 12–16)
INR BLD: 1.1 RATIO — SIGNIFICANT CHANGE UP (ref 0.65–1.3)
KETONES UR-MCNC: NEGATIVE — SIGNIFICANT CHANGE UP
LACTATE BLDV-MCNC: 5.7 MMOL/L — HIGH (ref 0.5–1.6)
LACTATE SERPL-SCNC: 7.5 MMOL/L — CRITICAL HIGH (ref 0.7–2)
LEUKOCYTE ESTERASE UR-ACNC: NEGATIVE — SIGNIFICANT CHANGE UP
LYMPHOCYTES # BLD AUTO: 0.27 K/UL — LOW (ref 1.2–3.4)
LYMPHOCYTES # BLD AUTO: 1.7 % — LOW (ref 20.5–51.1)
MACROCYTES BLD QL: SLIGHT — SIGNIFICANT CHANGE UP
MANUAL SMEAR VERIFICATION: SIGNIFICANT CHANGE UP
MCHC RBC-ENTMCNC: 30.1 PG — SIGNIFICANT CHANGE UP (ref 27–31)
MCHC RBC-ENTMCNC: 34.7 G/DL — SIGNIFICANT CHANGE UP (ref 32–37)
MCV RBC AUTO: 86.8 FL — SIGNIFICANT CHANGE UP (ref 81–99)
MONOCYTES # BLD AUTO: 0.7 K/UL — HIGH (ref 0.1–0.6)
MONOCYTES NFR BLD AUTO: 4.4 % — SIGNIFICANT CHANGE UP (ref 1.7–9.3)
NEUTROPHILS # BLD AUTO: 14.94 K/UL — HIGH (ref 1.4–6.5)
NEUTROPHILS NFR BLD AUTO: 80.9 % — HIGH (ref 42.2–75.2)
NEUTS BAND # BLD: 13 % — HIGH (ref 0–6)
NITRITE UR-MCNC: NEGATIVE — SIGNIFICANT CHANGE UP
PCO2 BLDV: 46 MMHG — SIGNIFICANT CHANGE UP (ref 41–51)
PH BLDV: 7.42 — SIGNIFICANT CHANGE UP (ref 7.26–7.43)
PH UR: 6.5 — SIGNIFICANT CHANGE UP (ref 5–8)
PLAT MORPH BLD: NORMAL — SIGNIFICANT CHANGE UP
PLATELET # BLD AUTO: 202 K/UL — SIGNIFICANT CHANGE UP (ref 130–400)
PO2 BLDV: 24 MMHG — SIGNIFICANT CHANGE UP (ref 20–40)
POTASSIUM BLDV-SCNC: 2.7 MMOL/L — LOW (ref 3.3–5.6)
POTASSIUM SERPL-MCNC: 3.3 MMOL/L — LOW (ref 3.5–5)
POTASSIUM SERPL-SCNC: 3.3 MMOL/L — LOW (ref 3.5–5)
PROT SERPL-MCNC: 5.8 G/DL — LOW (ref 6–8)
PROT UR-MCNC: NEGATIVE — SIGNIFICANT CHANGE UP
PROTHROM AB SERPL-ACNC: 12.7 SEC — SIGNIFICANT CHANGE UP (ref 9.95–12.87)
RBC # BLD: 3.95 M/UL — LOW (ref 4.2–5.4)
RBC # FLD: 12.3 % — SIGNIFICANT CHANGE UP (ref 11.5–14.5)
RBC BLD AUTO: ABNORMAL
SAO2 % BLDV: 46 % — SIGNIFICANT CHANGE UP
SODIUM SERPL-SCNC: 136 MMOL/L — SIGNIFICANT CHANGE UP (ref 135–146)
SP GR SPEC: 1.01 — LOW (ref 1.01–1.02)
TROPONIN T SERPL-MCNC: 0.03 NG/ML — CRITICAL HIGH
UROBILINOGEN FLD QL: SIGNIFICANT CHANGE UP
WBC # BLD: 15.91 K/UL — HIGH (ref 4.8–10.8)
WBC # FLD AUTO: 15.91 K/UL — HIGH (ref 4.8–10.8)

## 2020-08-07 PROCEDURE — 99291 CRITICAL CARE FIRST HOUR: CPT

## 2020-08-07 PROCEDURE — 93010 ELECTROCARDIOGRAM REPORT: CPT | Mod: 76

## 2020-08-07 PROCEDURE — 74176 CT ABD & PELVIS W/O CONTRAST: CPT | Mod: 26

## 2020-08-07 PROCEDURE — 71045 X-RAY EXAM CHEST 1 VIEW: CPT | Mod: 26

## 2020-08-07 RX ORDER — TRAMADOL HYDROCHLORIDE 50 MG/1
50 TABLET ORAL DAILY
Refills: 0 | Status: DISCONTINUED | OUTPATIENT
Start: 2020-08-07 | End: 2020-08-14

## 2020-08-07 RX ORDER — CEFTRIAXONE 500 MG/1
1000 INJECTION, POWDER, FOR SOLUTION INTRAMUSCULAR; INTRAVENOUS EVERY 24 HOURS
Refills: 0 | Status: DISCONTINUED | OUTPATIENT
Start: 2020-08-07 | End: 2020-08-12

## 2020-08-07 RX ORDER — DILTIAZEM HCL 120 MG
180 CAPSULE, EXT RELEASE 24 HR ORAL DAILY
Refills: 0 | Status: DISCONTINUED | OUTPATIENT
Start: 2020-08-07 | End: 2020-08-11

## 2020-08-07 RX ORDER — CLOPIDOGREL BISULFATE 75 MG/1
75 TABLET, FILM COATED ORAL DAILY
Refills: 0 | Status: DISCONTINUED | OUTPATIENT
Start: 2020-08-07 | End: 2020-08-14

## 2020-08-07 RX ORDER — ACETAMINOPHEN 500 MG
650 TABLET ORAL ONCE
Refills: 0 | Status: COMPLETED | OUTPATIENT
Start: 2020-08-07 | End: 2020-08-07

## 2020-08-07 RX ORDER — GABAPENTIN 400 MG/1
600 CAPSULE ORAL DAILY
Refills: 0 | Status: DISCONTINUED | OUTPATIENT
Start: 2020-08-07 | End: 2020-08-14

## 2020-08-07 RX ORDER — MORPHINE SULFATE 50 MG/1
2 CAPSULE, EXTENDED RELEASE ORAL ONCE
Refills: 0 | Status: DISCONTINUED | OUTPATIENT
Start: 2020-08-07 | End: 2020-08-07

## 2020-08-07 RX ORDER — CANDESARTAN CILEXETIL 8 MG/1
1 TABLET ORAL
Qty: 0 | Refills: 0 | DISCHARGE

## 2020-08-07 RX ORDER — ATENOLOL 25 MG/1
50 TABLET ORAL DAILY
Refills: 0 | Status: DISCONTINUED | OUTPATIENT
Start: 2020-08-07 | End: 2020-08-10

## 2020-08-07 RX ORDER — ENOXAPARIN SODIUM 100 MG/ML
60 INJECTION SUBCUTANEOUS EVERY 12 HOURS
Refills: 0 | Status: DISCONTINUED | OUTPATIENT
Start: 2020-08-07 | End: 2020-08-08

## 2020-08-07 RX ORDER — SIMVASTATIN 20 MG/1
40 TABLET, FILM COATED ORAL AT BEDTIME
Refills: 0 | Status: DISCONTINUED | OUTPATIENT
Start: 2020-08-07 | End: 2020-08-14

## 2020-08-07 RX ORDER — METOCLOPRAMIDE HCL 10 MG
10 TABLET ORAL ONCE
Refills: 0 | Status: COMPLETED | OUTPATIENT
Start: 2020-08-07 | End: 2020-08-07

## 2020-08-07 RX ORDER — CEFTRIAXONE 500 MG/1
1000 INJECTION, POWDER, FOR SOLUTION INTRAMUSCULAR; INTRAVENOUS ONCE
Refills: 0 | Status: COMPLETED | OUTPATIENT
Start: 2020-08-07 | End: 2020-08-07

## 2020-08-07 RX ORDER — SODIUM CHLORIDE 9 MG/ML
1850 INJECTION, SOLUTION INTRAVENOUS ONCE
Refills: 0 | Status: COMPLETED | OUTPATIENT
Start: 2020-08-07 | End: 2020-08-07

## 2020-08-07 RX ADMIN — SODIUM CHLORIDE 1850 MILLILITER(S): 9 INJECTION, SOLUTION INTRAVENOUS at 16:46

## 2020-08-07 RX ADMIN — SIMVASTATIN 40 MILLIGRAM(S): 20 TABLET, FILM COATED ORAL at 23:50

## 2020-08-07 RX ADMIN — MORPHINE SULFATE 2 MILLIGRAM(S): 50 CAPSULE, EXTENDED RELEASE ORAL at 19:20

## 2020-08-07 RX ADMIN — Medication 650 MILLIGRAM(S): at 16:10

## 2020-08-07 RX ADMIN — Medication 650 MILLIGRAM(S): at 16:46

## 2020-08-07 RX ADMIN — CEFTRIAXONE 100 MILLIGRAM(S): 500 INJECTION, POWDER, FOR SOLUTION INTRAMUSCULAR; INTRAVENOUS at 16:13

## 2020-08-07 RX ADMIN — GABAPENTIN 600 MILLIGRAM(S): 400 CAPSULE ORAL at 23:50

## 2020-08-07 RX ADMIN — SODIUM CHLORIDE 1850 MILLILITER(S): 9 INJECTION, SOLUTION INTRAVENOUS at 16:10

## 2020-08-07 RX ADMIN — CEFTRIAXONE 1000 MILLIGRAM(S): 500 INJECTION, POWDER, FOR SOLUTION INTRAMUSCULAR; INTRAVENOUS at 16:46

## 2020-08-07 RX ADMIN — TRAMADOL HYDROCHLORIDE 50 MILLIGRAM(S): 50 TABLET ORAL at 23:50

## 2020-08-07 RX ADMIN — MORPHINE SULFATE 2 MILLIGRAM(S): 50 CAPSULE, EXTENDED RELEASE ORAL at 19:00

## 2020-08-07 NOTE — ED ADULT NURSE NOTE - PLAN OF CARE
Bedside visitors/Side rails/Explanation of exam/test/NPO/Fall precautions/Position of comfort/Call bell

## 2020-08-07 NOTE — ED PROVIDER NOTE - NS ED ROS FT
Review of Systems:  	•	CONSTITUTIONAL - +fever, no diaphoresis  	•	SKIN - no rash, no lesions  	•	HEMATOLOGIC - no bleeding, no bruising  	•	EYES - no discharge, no injection  	•	ENT - no sore throat, no runny nose  	•	RESPIRATORY - no shortness of breath, no cough  	•	CARDIAC - +chest pain, no palpitations  	•	GI - no abd pain, +nausea, +vomiting, no diarrhea  	•	GENITO-URINARY - +dysuria, no hematuria  	•	MUSCULOSKELETAL - no joint pain, no muscle aches  	•	NEUROLOGIC - no dizziness, no headache

## 2020-08-07 NOTE — H&P ADULT - ASSESSMENT
74 year old female patient presented from the urgent clinic for management of atrial fibrillation with RVR.     # Afib with RBR  - Episode of chest discomfort while in afib with RVR and mildly elevated troponin  - Currently during H&P, patient in NSR  - Placing on therapeutic lovenox (GFR >30).  - Keeping home Diltiazem  - Chest pain might have been secondary to demand ischemia in a patient known CAD?   - Last NM stress imaging unremarkable in 2015  - Trending troponin, maybe consult cardiology in the morning if trending up, maybe repeat stress test if needed.    # DANGELO  - Baseline creatinine normal   - Differential includes pre-renal from afib with RVR, or secondary to infection  - Follow urine studies  - Trending BMP  - Holding ARB  - Holding chlorthalidone    # UTI  - Please follow urine culture  - Placing on ceftriaxone    # Dyslipidemia  - Continue simvastatin   - Follow morning lipid panel     # Hypertensive  - Holding ARB, Chlorthalidone  - Keeping atenolol, cardizem   - Might need to add nifedipine if BP uncontrolled or resume home meds if creatinine trends down     # Chronic back pain   - Keep Gabapentin, tramadol PRN    # Diet > DASH/TLC  # DVT > Lovenox 60mg bid 74 year old female patient presented from the urgent clinic for management of atrial fibrillation with RVR.     # Afib with RBR  - Episode of chest discomfort while in afib with RVR and mildly elevated troponin  - Currently during H&P, patient in NSR  - Placing on therapeutic lovenox (GFR >30).  - Keeping home Diltiazem  - Chest pain might have been secondary to demand ischemia in a patient known CAD?   - Last NM stress imaging unremarkable in 2015  - Trending troponin, maybe consult cardiology in the morning if trending up, maybe repeat stress test if needed.    # DANGELO  - Baseline creatinine normal   - Differential includes pre-renal from afib with RVR, or secondary to infection  - Follow urine studies  - Trending BMP  - Holding ARB  - Holding chlorthalidone    # UTI  - Please follow urine culture  - UA may be -ve secondary to taking antibiotics  - Placing on ceftriaxone for now    # Dyslipidemia  - Continue simvastatin   - Follow morning lipid panel     # Hypertensive  - Holding ARB, Chlorthalidone  - Keeping atenolol, cardizem   - Might need to add nifedipine if BP uncontrolled or resume home meds if creatinine trends down     # Chronic back pain   - Keep Gabapentin, tramadol PRN    # Diet > DASH/TLC  # DVT > Lovenox 60mg bid 74 year old female patient presented from the urgent clinic for management of atrial fibrillation with RVR.     # Afib with RVR - currently in NSR  - Episode of chest discomfort while in afib with RVR and mildly elevated troponin  - Currently during H&P, patient in NSR  - Placing on therapeutic lovenox (GFR >30).  - Keeping home Diltiazem  - Chest pain might have been secondary to demand ischemia in a patient known CAD?   - Last NM stress imaging unremarkable in 2015  - Trending troponin, pending cardiology recommendations    # DANGELO  - Baseline creatinine normal   - Differential includes pre-renal from afib with RVR, or secondary to infection  - Follow urine studies  - Trending BMP  - Holding ARB  - Holding chlorthalidone    # UTI  - Please follow urine culture  - UA may be -ve secondary to taking antibiotics  - Placing on ceftriaxone for now    # Dyslipidemia  - Continue simvastatin   - Follow morning lipid panel     # Hypertensive  - Holding ARB, Chlorthalidone  - Keeping atenolol, cardizem   - Might need to add nifedipine if BP uncontrolled or resume home meds if creatinine trends down     # Chronic back pain   - Keep Gabapentin, tramadol PRN    # Diet > DASH/TLC  # DVT > Lovenox 60mg bid

## 2020-08-07 NOTE — ED PROVIDER NOTE - PHYSICAL EXAMINATION
Vital Signs: Reviewed  GEN: alert, NAD, speaks full sentences, uncomfortable appearing  HEAD:  normocephalic, atraumatic  EYES:  PERRLA; conjunctivae without injection, drainage or discharge  ENMT:  nasal mucosa moist; mouth moist without ulcerations or lesions; throat moist without erythema, exudate, ulcerations or lesions  NECK:  supple  CARDIAC:  tachycardic, irregular  RESP:  respiratory rate and effort appear normal for age; lungs are clear to auscultation bilaterally; no rales or wheezes  ABDOMEN:  soft, nontender, nondistended  MUSCULOSKELETAL/NEURO:  normal movement, normal tone  SKIN:  normal skin color for age and race, well-perfused; warm and dry

## 2020-08-07 NOTE — ED PROVIDER NOTE - CLINICAL SUMMARY MEDICAL DECISION MAKING FREE TEXT BOX
Pt with CAD s/p CABG, recently diagnosed with UTI placed on macrobid who presents to [facility] for new onset AF with rvr. She was at urgent care for n/v/d that started today and reported chest pressure, and found to be in AF. Here pt in AF with rvr, hypotensive to 80/50s, febrile, and in AF with rvr. Labs showed evidence of septic shock. Broad spectrum abx, IVF given. DANGELO noted on labs as well likely 2/2 hypovolemic. GFR >30- lovenox started for ppx. Repeat LA uptrending and troponin mildly elevated likely type II 2/2 to demand ischemia. Pt admitted to ICU for further care.

## 2020-08-07 NOTE — H&P ADULT - NSHPLABSRESULTS_GEN_ALL_CORE
LABS:                        11.9   15.91 )-----------( 202      ( 07 Aug 2020 17:55 )             34.3     08-07    136  |  92<L>  |  30<H>  ----------------------------<  160<H>  3.3<L>   |  26  |  1.6<H>    Ca    8.8      07 Aug 2020 17:55    TPro  5.8<L>  /  Alb  3.7  /  TBili  0.5  /  DBili  x   /  AST  128<H>  /  ALT  73<H>  /  AlkPhos  60  08-07    PT/INR - ( 07 Aug 2020 17:55 )   PT: 12.70 sec;   INR: 1.10 ratio         PTT - ( 07 Aug 2020 17:55 )  PTT:26.0 sec  Aspartate Aminotransferase (AST/SGOT): 128 U/L (08-07-20 @ 17:55)  Alanine Aminotransferase (ALT/SGPT): 73 U/L (08-07-20 @ 17:55)  Bilirubin: Negative (08-07-20 @ 17:30)

## 2020-08-07 NOTE — ED ADULT NURSE NOTE - PRO INTERPRETER NEED 2
Labor and Delivery H&P


Chief complaint: contractions


HPI: 





25 year old  at 38 and 1/7 weeks, 5 cm dilated, sent to L&D for 

delivery.


Due date: 18


Grav: 3


Para: 2


Current pregnancy complications: none


Abnormal US findings: No


Allergies/Adverse Reactions: 


 Allergies











Allergy/AdvReac Type Severity Reaction Status Date / Time


 


No Known Allergies Allergy   Verified 18 17:35











Social history: none





- Vaginal Exam


cm dilated: 6


Effacement: 75%


Station: -1





- Assessment


L&D Assessment: term patient in labor





- Plan


Plan: admit to L&D
English

## 2020-08-07 NOTE — ED PROVIDER NOTE - PROGRESS NOTE DETAILS
PT SIGNED OUT TO DR. REYES, FOLLOW UP LABS, REPEAT LACTATE, U/A, REASSESS AND DISPO. AG: pt seen at bedside resting comfortably. Says she feels better than when she came in. BP higher, more stable. ICU for rising lactate despite fluids.

## 2020-08-07 NOTE — ED PROVIDER NOTE - ATTENDING CONTRIBUTION TO CARE
I personally evaluated the patient. I reviewed the Resident’s or Physician Assistant’s note (as assigned above), and agree with the findings and plan except as documented in my note.  75 Y/O F HTN, DLD, CAD, S/P CABG, CHRONIC BACK PAIN, GERD, SENT TO ED FROM [facility] WITH NEW ONSET AFIB. PT DIAGNOSED WITH UTI 2 ADYS AGO AND STARTED ON MACROBID. PT TOOK 2 DOSES AND WAS UNABLE TO CONTINUE THE MEDICATION DUE TO VOMITING. PT WITH N/V/D SINCE LAST NIGHT. + DYSURIA. NO HEMATURIA. + BACK PAIN-UNCHANGED FROM CHRONIC BACK PAIN. NO FEVER, CHILLS. PT NOTED LOW BP LAST NIGHT, SBP-95. NO ABD PAIN. NO COUGH, SOB. TODAY WHILE AT [facility], PT DEVELOPED CP. CP DESCRIBED AS PRESSURE AND IS NOW RESOLVED. NO PALPITATIONS, DIZZINESS. NO LEG EDEMA. VITALS NOTED. ALERT OX3 NAD WELL APPEARING. NCAT PERRL. EOMI. OP NORMAL. MMM. NECK SUPPLE. LUNGS CLEAR B/L. TACHYCARDIA, IRREG IRREG RHYTHM. ABD- SOFT NONTENDER. BACK NONTENDER. NO CVAT B/L. . CN 2-12 INTACT. NEURO EXAM NONFOCAL. NO RASH.

## 2020-08-07 NOTE — H&P ADULT - HISTORY OF PRESENT ILLNESS
74 year old female patient presented from the urgent clinic for management of atrial fibrillation with RVR.     Known CAD sp CABG, hypertension, dyslipidemic, chronic back pain.     Current history goes back to 4 days ptp when the patient began experiencing dysuria, frequency and urgency. Patient seeked medical attention 2 days PTP for the dysuria and was discharged on an unknown oral antibiotic.   1 day ptp, patient develops diarrhea, non-bloody, non-mucoid, large volume x5 episodes associated with non-billous non-bloody x10 episodes of vomiting. Patient returns to urgent care for assessment. During her visit, patient experienced acute onset palpitations associated with pressure like chest discomfort localized to the retrosternal region and non-radiating. No diaphoresis, no dizziness, no syncope or presyncope. An ECG was done in the urgent care which showed afib with RVR, patient hypotensive during the episode, transfered by EMS to [facility] for management.

## 2020-08-07 NOTE — H&P ADULT - NSHPPHYSICALEXAM_GEN_ALL_CORE
General: A/ox 3, No acute Distress  Neck: Supple, NO JVD  Cardiac: S1 S2 heard regular, No audible murmurs  Pulmonary: Good bilateral breath sounds, Breathing unlabored, No Rhonchi/Rales/Wheezing  Abdomen: Soft, Non -tender, +BS   Extremities: No Rashes, No edema  Neuro: A/o x 3, No focal deficits  Psch: normal mood , normal affect    T(C): 36.9 (08-07-20 @ 17:15), Max: 38.3 (08-07-20 @ 16:04)  HR: 57 (08-07-20 @ 20:59) (54 - 129)  BP: 145/65 (08-07-20 @ 20:59) (71/43 - 145/65)  RR: 18 (08-07-20 @ 20:59) (16 - 22)  SpO2: 100% (08-07-20 @ 20:59) (96% - 100%)

## 2020-08-07 NOTE — ED ADULT NURSE NOTE - INTERVENTIONS DEFINITIONS
Seltzer to call system/Provide visual clues: red socks/Call bell, personal items and telephone within reach

## 2020-08-07 NOTE — ED PROVIDER NOTE - OBJECTIVE STATEMENT
74yF pmhx HTN, HLD, CAD s/p CABG, chronic back pain BIBEMS for new-onset afib; pt states she has been feeling unwell, dysuria for 3 days, dx'ed w/ UTI rx'ed macrobid has so far had 2 doses but started feeling nauseous/vomiting, went to [facility] for nausea started having chest pain so had ECG which showed rapid afib. Pt hypotensive tachycardic en route.

## 2020-08-08 LAB
A1C WITH ESTIMATED AVERAGE GLUCOSE RESULT: 6.7 % — HIGH (ref 4–5.6)
ALBUMIN SERPL ELPH-MCNC: 3.9 G/DL — SIGNIFICANT CHANGE UP (ref 3.5–5.2)
ALP SERPL-CCNC: 66 U/L — SIGNIFICANT CHANGE UP (ref 30–115)
ALT FLD-CCNC: 83 U/L — HIGH (ref 0–41)
ANION GAP SERPL CALC-SCNC: 14 MMOL/L — SIGNIFICANT CHANGE UP (ref 7–14)
APPEARANCE UR: ABNORMAL
APTT BLD: 27 SEC — SIGNIFICANT CHANGE UP (ref 27–39.2)
AST SERPL-CCNC: 98 U/L — HIGH (ref 0–41)
BACTERIA # UR AUTO: NEGATIVE — SIGNIFICANT CHANGE UP
BASOPHILS # BLD AUTO: 0.02 K/UL — SIGNIFICANT CHANGE UP (ref 0–0.2)
BASOPHILS NFR BLD AUTO: 0.1 % — SIGNIFICANT CHANGE UP (ref 0–1)
BILIRUB SERPL-MCNC: 0.5 MG/DL — SIGNIFICANT CHANGE UP (ref 0.2–1.2)
BILIRUB UR-MCNC: NEGATIVE — SIGNIFICANT CHANGE UP
BUN SERPL-MCNC: 30 MG/DL — HIGH (ref 10–20)
CALCIUM SERPL-MCNC: 9.2 MG/DL — SIGNIFICANT CHANGE UP (ref 8.5–10.1)
CHLORIDE SERPL-SCNC: 92 MMOL/L — LOW (ref 98–110)
CHOLEST SERPL-MCNC: 164 MG/DL — SIGNIFICANT CHANGE UP (ref 100–200)
CO2 SERPL-SCNC: 30 MMOL/L — SIGNIFICANT CHANGE UP (ref 17–32)
COLOR SPEC: YELLOW — SIGNIFICANT CHANGE UP
CREAT ?TM UR-MCNC: 98 MG/DL — SIGNIFICANT CHANGE UP
CREAT SERPL-MCNC: 1.4 MG/DL — SIGNIFICANT CHANGE UP (ref 0.7–1.5)
CULTURE RESULTS: SIGNIFICANT CHANGE UP
DIFF PNL FLD: NEGATIVE — SIGNIFICANT CHANGE UP
EOSINOPHIL # BLD AUTO: 0.03 K/UL — SIGNIFICANT CHANGE UP (ref 0–0.7)
EOSINOPHIL NFR BLD AUTO: 0.2 % — SIGNIFICANT CHANGE UP (ref 0–8)
EPI CELLS # UR: 1 /HPF — SIGNIFICANT CHANGE UP (ref 0–5)
ESTIMATED AVERAGE GLUCOSE: 146 MG/DL — HIGH (ref 68–114)
GLUCOSE SERPL-MCNC: 154 MG/DL — HIGH (ref 70–99)
GLUCOSE UR QL: ABNORMAL
HCT VFR BLD CALC: 35.9 % — LOW (ref 37–47)
HDLC SERPL-MCNC: 42 MG/DL — LOW
HGB BLD-MCNC: 12.3 G/DL — SIGNIFICANT CHANGE UP (ref 12–16)
HYALINE CASTS # UR AUTO: 0 /LPF — SIGNIFICANT CHANGE UP (ref 0–7)
IMM GRANULOCYTES NFR BLD AUTO: 0.7 % — HIGH (ref 0.1–0.3)
INR BLD: 1.19 RATIO — SIGNIFICANT CHANGE UP (ref 0.65–1.3)
KETONES UR-MCNC: NEGATIVE — SIGNIFICANT CHANGE UP
LEUKOCYTE ESTERASE UR-ACNC: NEGATIVE — SIGNIFICANT CHANGE UP
LIPID PNL WITH DIRECT LDL SERPL: 92 MG/DL — SIGNIFICANT CHANGE UP (ref 4–129)
LYMPHOCYTES # BLD AUTO: 0.27 K/UL — LOW (ref 1.2–3.4)
LYMPHOCYTES # BLD AUTO: 2 % — LOW (ref 20.5–51.1)
MCHC RBC-ENTMCNC: 29.5 PG — SIGNIFICANT CHANGE UP (ref 27–31)
MCHC RBC-ENTMCNC: 34.3 G/DL — SIGNIFICANT CHANGE UP (ref 32–37)
MCV RBC AUTO: 86.1 FL — SIGNIFICANT CHANGE UP (ref 81–99)
MONOCYTES # BLD AUTO: 0.62 K/UL — HIGH (ref 0.1–0.6)
MONOCYTES NFR BLD AUTO: 4.6 % — SIGNIFICANT CHANGE UP (ref 1.7–9.3)
NEUTROPHILS # BLD AUTO: 12.41 K/UL — HIGH (ref 1.4–6.5)
NEUTROPHILS NFR BLD AUTO: 92.4 % — HIGH (ref 42.2–75.2)
NITRITE UR-MCNC: NEGATIVE — SIGNIFICANT CHANGE UP
NRBC # BLD: 0 /100 WBCS — SIGNIFICANT CHANGE UP (ref 0–0)
PH UR: 6.5 — SIGNIFICANT CHANGE UP (ref 5–8)
PLATELET # BLD AUTO: 205 K/UL — SIGNIFICANT CHANGE UP (ref 130–400)
POTASSIUM SERPL-MCNC: 3.1 MMOL/L — LOW (ref 3.5–5)
POTASSIUM SERPL-SCNC: 3.1 MMOL/L — LOW (ref 3.5–5)
PROT ?TM UR-MCNC: 17 MG/DLG/24H — SIGNIFICANT CHANGE UP
PROT SERPL-MCNC: 6.2 G/DL — SIGNIFICANT CHANGE UP (ref 6–8)
PROT UR-MCNC: ABNORMAL
PROT/CREAT UR-RTO: 0.2 RATIO — SIGNIFICANT CHANGE UP (ref 0–0.2)
PROTHROM AB SERPL-ACNC: 13.7 SEC — HIGH (ref 9.95–12.87)
RBC # BLD: 4.17 M/UL — LOW (ref 4.2–5.4)
RBC # FLD: 12.6 % — SIGNIFICANT CHANGE UP (ref 11.5–14.5)
RBC CASTS # UR COMP ASSIST: 0 /HPF — SIGNIFICANT CHANGE UP (ref 0–4)
SARS-COV-2 IGG SERPL QL IA: NEGATIVE — SIGNIFICANT CHANGE UP
SARS-COV-2 IGM SERPL IA-ACNC: 0.09 INDEX — SIGNIFICANT CHANGE UP
SARS-COV-2 RNA SPEC QL NAA+PROBE: SIGNIFICANT CHANGE UP
SODIUM SERPL-SCNC: 136 MMOL/L — SIGNIFICANT CHANGE UP (ref 135–146)
SP GR SPEC: 1.02 — SIGNIFICANT CHANGE UP (ref 1.01–1.02)
SPECIMEN SOURCE: SIGNIFICANT CHANGE UP
TOTAL CHOLESTEROL/HDL RATIO MEASUREMENT: 3.9 RATIO — LOW (ref 4–5.5)
TRIGL SERPL-MCNC: 140 MG/DL — SIGNIFICANT CHANGE UP (ref 10–149)
TROPONIN T SERPL-MCNC: 0.11 NG/ML — CRITICAL HIGH
TROPONIN T SERPL-MCNC: 0.15 NG/ML — CRITICAL HIGH
TSH SERPL-MCNC: 1.48 UIU/ML — SIGNIFICANT CHANGE UP (ref 0.27–4.2)
UROBILINOGEN FLD QL: SIGNIFICANT CHANGE UP
WBC # BLD: 13.45 K/UL — HIGH (ref 4.8–10.8)
WBC # FLD AUTO: 13.45 K/UL — HIGH (ref 4.8–10.8)
WBC UR QL: 4 /HPF — SIGNIFICANT CHANGE UP (ref 0–5)

## 2020-08-08 PROCEDURE — 76770 US EXAM ABDO BACK WALL COMP: CPT | Mod: 26

## 2020-08-08 PROCEDURE — 99221 1ST HOSP IP/OBS SF/LOW 40: CPT | Mod: GC

## 2020-08-08 PROCEDURE — 99223 1ST HOSP IP/OBS HIGH 75: CPT

## 2020-08-08 RX ORDER — CHLORHEXIDINE GLUCONATE 213 G/1000ML
1 SOLUTION TOPICAL DAILY
Refills: 0 | Status: DISCONTINUED | OUTPATIENT
Start: 2020-08-08 | End: 2020-08-14

## 2020-08-08 RX ORDER — ACETAMINOPHEN 500 MG
650 TABLET ORAL ONCE
Refills: 0 | Status: COMPLETED | OUTPATIENT
Start: 2020-08-08 | End: 2020-08-08

## 2020-08-08 RX ORDER — PANTOPRAZOLE SODIUM 20 MG/1
40 TABLET, DELAYED RELEASE ORAL
Refills: 0 | Status: DISCONTINUED | OUTPATIENT
Start: 2020-08-08 | End: 2020-08-14

## 2020-08-08 RX ORDER — PANTOPRAZOLE SODIUM 20 MG/1
40 TABLET, DELAYED RELEASE ORAL DAILY
Refills: 0 | Status: DISCONTINUED | OUTPATIENT
Start: 2020-08-08 | End: 2020-08-08

## 2020-08-08 RX ORDER — POTASSIUM CHLORIDE 20 MEQ
40 PACKET (EA) ORAL EVERY 4 HOURS
Refills: 0 | Status: COMPLETED | OUTPATIENT
Start: 2020-08-08 | End: 2020-08-08

## 2020-08-08 RX ORDER — APIXABAN 2.5 MG/1
5 TABLET, FILM COATED ORAL EVERY 12 HOURS
Refills: 0 | Status: DISCONTINUED | OUTPATIENT
Start: 2020-08-08 | End: 2020-08-11

## 2020-08-08 RX ADMIN — Medication 180 MILLIGRAM(S): at 06:36

## 2020-08-08 RX ADMIN — Medication 40 MILLIEQUIVALENT(S): at 12:50

## 2020-08-08 RX ADMIN — Medication 650 MILLIGRAM(S): at 04:05

## 2020-08-08 RX ADMIN — Medication 40 MILLIEQUIVALENT(S): at 09:39

## 2020-08-08 RX ADMIN — CEFTRIAXONE 100 MILLIGRAM(S): 500 INJECTION, POWDER, FOR SOLUTION INTRAMUSCULAR; INTRAVENOUS at 06:35

## 2020-08-08 RX ADMIN — PANTOPRAZOLE SODIUM 40 MILLIGRAM(S): 20 TABLET, DELAYED RELEASE ORAL at 12:48

## 2020-08-08 RX ADMIN — CLOPIDOGREL BISULFATE 75 MILLIGRAM(S): 75 TABLET, FILM COATED ORAL at 12:48

## 2020-08-08 RX ADMIN — ATENOLOL 50 MILLIGRAM(S): 25 TABLET ORAL at 06:36

## 2020-08-08 RX ADMIN — ENOXAPARIN SODIUM 60 MILLIGRAM(S): 100 INJECTION SUBCUTANEOUS at 06:36

## 2020-08-08 RX ADMIN — Medication 650 MILLIGRAM(S): at 12:00

## 2020-08-08 RX ADMIN — APIXABAN 5 MILLIGRAM(S): 2.5 TABLET, FILM COATED ORAL at 19:49

## 2020-08-08 RX ADMIN — Medication 10 MILLIGRAM(S): at 00:06

## 2020-08-08 RX ADMIN — GABAPENTIN 600 MILLIGRAM(S): 400 CAPSULE ORAL at 12:52

## 2020-08-08 RX ADMIN — TRAMADOL HYDROCHLORIDE 50 MILLIGRAM(S): 50 TABLET ORAL at 00:20

## 2020-08-08 RX ADMIN — SIMVASTATIN 40 MILLIGRAM(S): 20 TABLET, FILM COATED ORAL at 21:22

## 2020-08-08 RX ADMIN — CHLORHEXIDINE GLUCONATE 1 APPLICATION(S): 213 SOLUTION TOPICAL at 14:23

## 2020-08-08 NOTE — PROVIDER CONTACT NOTE (CHANGE IN STATUS NOTIFICATION) - SITUATION
Pt new admission to unit. Pt c/o nausea with 1 episode of non-bloody emesis. Pt continues to complain of nausea. MD Conner made aware.

## 2020-08-08 NOTE — PHARMACOTHERAPY INTERVENTION NOTE - COMMENTS
recommended changing pantoprazole to po as per pt. Pt. lives in a pvt home with their son. Pt. has steps at home. Pt. was previously ambulating household and community distances without the use of an AD independently. Pt. was previously independent with ADLs. Pt. returned to chair at end of therapy session with all lines and tubes intact, call bell in reach and in NAD. CHRIS GLASS. bedside

## 2020-08-08 NOTE — CONSULT NOTE ADULT - ATTENDING COMMENTS
Attending Statement: I have personally performed a face to face diagnostic evaluation on this patient. The patient is suffering from Sepsis present on admission. I have reviewed the above note and agree with the history, exam and suggestions for care, except as I have noted in the text.

## 2020-08-09 LAB
ANION GAP SERPL CALC-SCNC: 11 MMOL/L — SIGNIFICANT CHANGE UP (ref 7–14)
BUN SERPL-MCNC: 29 MG/DL — HIGH (ref 10–20)
CALCIUM SERPL-MCNC: 8.4 MG/DL — LOW (ref 8.5–10.1)
CHLORIDE SERPL-SCNC: 93 MMOL/L — LOW (ref 98–110)
CO2 SERPL-SCNC: 30 MMOL/L — SIGNIFICANT CHANGE UP (ref 17–32)
CREAT SERPL-MCNC: 1.3 MG/DL — SIGNIFICANT CHANGE UP (ref 0.7–1.5)
CREATININE, URINE RESULT: 99 MG/DL — SIGNIFICANT CHANGE UP
CULTURE RESULTS: SIGNIFICANT CHANGE UP
GLUCOSE SERPL-MCNC: 154 MG/DL — HIGH (ref 70–99)
POTASSIUM SERPL-MCNC: 3.4 MMOL/L — LOW (ref 3.5–5)
POTASSIUM SERPL-SCNC: 3.4 MMOL/L — LOW (ref 3.5–5)
PROT ?TM UR-MCNC: 16 MG/DL — HIGH (ref 0–12)
SODIUM SERPL-SCNC: 134 MMOL/L — LOW (ref 135–146)
SPECIMEN SOURCE: SIGNIFICANT CHANGE UP

## 2020-08-09 PROCEDURE — 99233 SBSQ HOSP IP/OBS HIGH 50: CPT

## 2020-08-09 PROCEDURE — 93306 TTE W/DOPPLER COMPLETE: CPT | Mod: 26

## 2020-08-09 RX ORDER — LIDOCAINE 4 G/100G
1 CREAM TOPICAL ONCE
Refills: 0 | Status: DISCONTINUED | OUTPATIENT
Start: 2020-08-09 | End: 2020-08-14

## 2020-08-09 RX ADMIN — Medication 180 MILLIGRAM(S): at 05:22

## 2020-08-09 RX ADMIN — APIXABAN 5 MILLIGRAM(S): 2.5 TABLET, FILM COATED ORAL at 05:23

## 2020-08-09 RX ADMIN — ATENOLOL 50 MILLIGRAM(S): 25 TABLET ORAL at 05:23

## 2020-08-09 RX ADMIN — CLOPIDOGREL BISULFATE 75 MILLIGRAM(S): 75 TABLET, FILM COATED ORAL at 12:02

## 2020-08-09 RX ADMIN — TRAMADOL HYDROCHLORIDE 50 MILLIGRAM(S): 50 TABLET ORAL at 12:02

## 2020-08-09 RX ADMIN — PANTOPRAZOLE SODIUM 40 MILLIGRAM(S): 20 TABLET, DELAYED RELEASE ORAL at 05:22

## 2020-08-09 RX ADMIN — CEFTRIAXONE 100 MILLIGRAM(S): 500 INJECTION, POWDER, FOR SOLUTION INTRAMUSCULAR; INTRAVENOUS at 06:12

## 2020-08-09 RX ADMIN — APIXABAN 5 MILLIGRAM(S): 2.5 TABLET, FILM COATED ORAL at 17:17

## 2020-08-09 RX ADMIN — GABAPENTIN 600 MILLIGRAM(S): 400 CAPSULE ORAL at 12:02

## 2020-08-09 RX ADMIN — SIMVASTATIN 40 MILLIGRAM(S): 20 TABLET, FILM COATED ORAL at 21:27

## 2020-08-09 NOTE — PROGRESS NOTE ADULT - ASSESSMENT
74 year old female with known CAD s/p CABG, hypertension, dyslipidemic and chronic back pain presented from the urgent clinic for management of atrial fibrillation with RVR.       # Afib with RVR - currently in NSR  - Episode of chest discomfort while in afib with RVR and mildly elevated troponin  - Currently during H&P, patient in NSR  - Keeping home Diltiazem  - Chest pain might have been secondary to demand ischemia in a patient known CAD?   - Last NM stress imaging unremarkable in 2015  - Troponins downtrending  - f/u ECHO/repeat EKG and CMP  - d/c therapeutic lovenox and started on Eliquis  - f/u with cardiology reccs    # DANGELO  - Baseline creatinine normal   - Differential includes pre-renal from afib with RVR, or secondary to infection  - Follow urine studies  - Trending BMP  - Holding ARB  - Holding chlorthalidone    #Diarrhea   - improved  - reglan prn      # UTI  - Please follow urine culture  - UA may be -ve secondary to taking antibiotics  - continue with ceftriaxone for now    # Dyslipidemia  - Continue simvastatin   - Follow morning lipid panel     # Hypertensive  - Holding ARB, Chlorthalidone  - Keeping atenolol, cardizem   - Might need to add nifedipine if BP uncontrolled or resume home meds if creatinine trends down     # Chronic back pain   - Keep Gabapentin, tramadol PRN    # Diet > DASH/TLC  # DVT > Lovenox 60mg bid 74 year old female with known CAD s/p CABG, hypertension, dyslipidemic and chronic back pain presented from the urgent clinic for management of atrial fibrillation with RVR.       # Afib with RVR - currently in NSR  - Episode of chest discomfort while in afib with RVR and mildly elevated troponin  - Currently during H&P, patient in NSR  - Keeping home Diltiazem  - Chest pain might have been secondary to demand ischemia in a patient known CAD?   - Last NM stress imaging unremarkable in 2015  - Troponins downtrending ; follow up enzymes  - f/u ECHO/repeat EKG and CMP and TSH  - d/c therapeutic lovenox and started on Eliquis  - f/u with cardiology reccs    # DANGELO  - Baseline creatinine normal   - Differential includes pre-renal from afib with RVR, or secondary to infection  - Follow urine studies  - Trending BMP  - Holding ARB  - Holding chlorthalidone  - f/u urine culture    #Diarrhea   - improved  - reglan prn      # UTI  - Please follow urine culture  - UA may be -ve secondary to taking antibiotics  - continue with ceftriaxone for now    # Dyslipidemia  - Continue simvastatin   - Follow morning lipid panel     # Hypertensive  - Holding ARB, Chlorthalidone  - Keeping atenolol, cardizem   - Might need to add nifedipine if BP uncontrolled or resume home meds if creatinine trends down     # Chronic back pain   - Keep Gabapentin, tramadol PRN    # Diet > DASH/TLC  # DVT > Lovenox 60mg bid

## 2020-08-09 NOTE — PROGRESS NOTE ADULT - SUBJECTIVE AND OBJECTIVE BOX
Patient is a 74y old  female who presents with a chief complaint of Palpitations (08 Aug 2020 10:46)      History of Present Illness:   74 year old female with known CAD s/p CABG, hypertension, dyslipidemic and chronic back pain presented from the urgent clinic for management of atrial fibrillation with RVR.  Current history goes back to 4 days when the patient began experiencing dysuria, frequency and urgency. Patient seeked medical attention 2 days PTP for the dysuria and was discharged on an unknown oral antibiotic.   1 day ptp, patient develops diarrhea, non-bloody, non-mucoid, large volume x5 episodes associated with non-billous non-bloody x10 episodes of vomiting. Patient returns to urgent care for assessment. During her visit, patient experienced acute onset palpitations associated with pressure like chest discomfort localized to the retrosternal region and non-radiating. No diaphoresis, no dizziness, no syncope or presyncope. An ECG was done in the urgent care which showed afib with RVR, patient hypotensive during the episode, transfered by EMS to [facility] for management.       INTERVAL HPI/OVERNIGHT EVENTS:   No overnight events   Afebrile, hemodynamically stable     ICU Vital Signs Last 24 Hrs  T(C): 37.8 (08 Aug 2020 20:00), Max: 39.3 (08 Aug 2020 04:00)  T(F): 100 (08 Aug 2020 20:00), Max: 102.7 (08 Aug 2020 04:00)  HR: 70 (09 Aug 2020 00:00) (56 - 84)  BP: 135/68 (09 Aug 2020 00:00) (94/43 - 151/70)  BP(mean): 106 (09 Aug 2020 00:00) (65 - 108)  ABP: --  ABP(mean): --  RR: 27 (09 Aug 2020 00:00) (0 - 43)  SpO2: 94% (09 Aug 2020 00:00) (94% - 100%)    I&O's Summary    07 Aug 2020 07:01  -  08 Aug 2020 07:00  --------------------------------------------------------  IN: 2310 mL / OUT: 250 mL / NET: 2060 mL    08 Aug 2020 07:01  -  09 Aug 2020 03:29  --------------------------------------------------------  IN: 1200 mL / OUT: 1450 mL / NET: -250 mL          LABS:                        12.3   13.45 )-----------( 205      ( 08 Aug 2020 04:11 )             35.9     08-    134<L>  |  93<L>  |  29<H>  ----------------------------<  154<H>  3.4<L>   |  30  |  1.3    Ca    8.4<L>      09 Aug 2020 00:00  Mg     1.5         TPro  6.2  /  Alb  3.9  /  TBili  0.5  /  DBili  x   /  AST  98<H>  /  ALT  83<H>  /  AlkPhos  66  08-08    PT/INR - ( 08 Aug 2020 04:11 )   PT: 13.70 sec;   INR: 1.19 ratio         PTT - ( 08 Aug 2020 04:11 )  PTT:27.0 sec  Urinalysis Basic - ( 07 Aug 2020 17:30 )    Color: Light Yellow / Appearance: Clear / S.007 / pH: x  Gluc: x / Ketone: Negative  / Bili: Negative / Urobili: <2 mg/dL   Blood: x / Protein: Negative / Nitrite: Negative   Leuk Esterase: Negative / RBC: x / WBC x   Sq Epi: x / Non Sq Epi: x / Bacteria: x      CAPILLARY BLOOD GLUCOSE            RADIOLOGY & ADDITIONAL TESTS:    < from: CT Abdomen and Pelvis No Cont (20 @ 22:42) >  MPRESSION:    1.  No CT evidence of acute abdominopelvic pathology.    2.  Partially imaged lung findings including chronic right middle lobe fibronodular changes, without significant difference compared to 2018.    3.  Moderate coronary artery calcification. Poststernotomy      < end of copied text >      < from: Xray Chest 1 View-PORTABLE IMMEDIATE (20 @ 17:04) >  Impression:    No radiographic evidence of acute cardiopulmonary disease.    As post CABG. Cardiomegaly.    < end of copied text >    < from: US Kidney and Bladder (20 @ 11:20) >  IMPRESSION:    Unremarkable evaluation the kidneys and bladder.    < end of copied text >      Consultant(s) Notes Reviewed:  [x ] YES  [ ] NO    MEDICATIONS  (STANDING):  apixaban 5 milliGRAM(s) Oral every 12 hours  ATENolol  Tablet 50 milliGRAM(s) Oral daily  cefTRIAXone   IVPB 1000 milliGRAM(s) IV Intermittent every 24 hours  chlorhexidine 4% Liquid 1 Application(s) Topical daily  clopidogrel Tablet 75 milliGRAM(s) Oral daily  diltiazem    milliGRAM(s) Oral daily  gabapentin 600 milliGRAM(s) Oral daily  pantoprazole    Tablet 40 milliGRAM(s) Oral before breakfast  simvastatin 40 milliGRAM(s) Oral at bedtime    MEDICATIONS  (PRN):  traMADol 50 milliGRAM(s) Oral daily PRN Severe Pain (7 - 10)      PHYSICAL EXAM:  GENERAL:   HEAD:  Atraumatic, Normocephalic  EYES: EOMI, PERRLA, conjunctiva and sclera clear  NECK: Supple, No JVD, Normal thyroid, no enlarged nodes  NERVOUS SYSTEM:  Alert & Awake.   CHEST/LUNG: B/L good air entry; No rales, rhonchi, or wheezing  HEART: S1S2 normal, no S3, Regular rate and rhythm; No murmurs  ABDOMEN: Soft, Nontender, Nondistended; Bowel sounds present  EXTREMITIES:  2+ Peripheral Pulses, No clubbing, cyanosis, or edema  LYMPH: No lymphadenopathy noted  SKIN: No rashes or lesions    Care Discussed with Consultants/Other Providers [ x] YES  [ ] NO

## 2020-08-09 NOTE — PROGRESS NOTE ADULT - ATTENDING COMMENTS
Patient seen and examined independently earlier today. I agree with most of the resident's note, physical exam, and plan except as below. Patient feels better. No complaints. Denies CP, SOB, AP, urinary symptoms. Remainder ROS unremarkable.    Gen: NAD, AA0x3  CV: nl S1 S2  Resp: decreased BS b/l  GI: NT/ND/S +BS  MS: no c/c/e  Neuro: nonfocal    tele- afib    #?UTI/New Afib/Troponemia/H/o CAD/HTN/HLD  -Eliquis, rate control  -cardio f/u  -f/u TSH  -Rocephin      #Progress Note Handoff  Pending (specify):  Cardiac clearance___Clinical improvement and stability___x_Tests_____, PT________  Pt/Family discussion: Pt informed and agrees with the current plan  Disposition: Home____x__SNF_______To be determined________

## 2020-08-10 LAB
ANION GAP SERPL CALC-SCNC: 10 MMOL/L — SIGNIFICANT CHANGE UP (ref 7–14)
BUN SERPL-MCNC: 26 MG/DL — HIGH (ref 10–20)
CALCIUM SERPL-MCNC: 9.5 MG/DL — SIGNIFICANT CHANGE UP (ref 8.5–10.1)
CHLORIDE SERPL-SCNC: 96 MMOL/L — LOW (ref 98–110)
CO2 SERPL-SCNC: 33 MMOL/L — HIGH (ref 17–32)
CREAT SERPL-MCNC: 1.3 MG/DL — SIGNIFICANT CHANGE UP (ref 0.7–1.5)
GLUCOSE SERPL-MCNC: 135 MG/DL — HIGH (ref 70–99)
HCT VFR BLD CALC: 36.9 % — LOW (ref 37–47)
HGB BLD-MCNC: 12.6 G/DL — SIGNIFICANT CHANGE UP (ref 12–16)
MAGNESIUM SERPL-MCNC: 1.7 MG/DL — LOW (ref 1.8–2.4)
MCHC RBC-ENTMCNC: 29.7 PG — SIGNIFICANT CHANGE UP (ref 27–31)
MCHC RBC-ENTMCNC: 34.1 G/DL — SIGNIFICANT CHANGE UP (ref 32–37)
MCV RBC AUTO: 87 FL — SIGNIFICANT CHANGE UP (ref 81–99)
NRBC # BLD: 0 /100 WBCS — SIGNIFICANT CHANGE UP (ref 0–0)
PHOSPHATE SERPL-MCNC: 2.7 MG/DL — SIGNIFICANT CHANGE UP (ref 2.1–4.9)
PLATELET # BLD AUTO: 208 K/UL — SIGNIFICANT CHANGE UP (ref 130–400)
POTASSIUM SERPL-MCNC: 3.5 MMOL/L — SIGNIFICANT CHANGE UP (ref 3.5–5)
POTASSIUM SERPL-SCNC: 3.5 MMOL/L — SIGNIFICANT CHANGE UP (ref 3.5–5)
RBC # BLD: 4.24 M/UL — SIGNIFICANT CHANGE UP (ref 4.2–5.4)
RBC # FLD: 12.9 % — SIGNIFICANT CHANGE UP (ref 11.5–14.5)
SODIUM SERPL-SCNC: 139 MMOL/L — SIGNIFICANT CHANGE UP (ref 135–146)
WBC # BLD: 6.7 K/UL — SIGNIFICANT CHANGE UP (ref 4.8–10.8)
WBC # FLD AUTO: 6.7 K/UL — SIGNIFICANT CHANGE UP (ref 4.8–10.8)

## 2020-08-10 PROCEDURE — 93016 CV STRESS TEST SUPVJ ONLY: CPT

## 2020-08-10 PROCEDURE — 99233 SBSQ HOSP IP/OBS HIGH 50: CPT

## 2020-08-10 PROCEDURE — 93018 CV STRESS TEST I&R ONLY: CPT

## 2020-08-10 PROCEDURE — 78452 HT MUSCLE IMAGE SPECT MULT: CPT | Mod: 26

## 2020-08-10 RX ORDER — MAGNESIUM SULFATE 500 MG/ML
2 VIAL (ML) INJECTION ONCE
Refills: 0 | Status: DISCONTINUED | OUTPATIENT
Start: 2020-08-10 | End: 2020-08-10

## 2020-08-10 RX ORDER — POTASSIUM CHLORIDE 20 MEQ
40 PACKET (EA) ORAL ONCE
Refills: 0 | Status: COMPLETED | OUTPATIENT
Start: 2020-08-10 | End: 2020-08-10

## 2020-08-10 RX ORDER — MAGNESIUM SULFATE 500 MG/ML
2 VIAL (ML) INJECTION ONCE
Refills: 0 | Status: COMPLETED | OUTPATIENT
Start: 2020-08-10 | End: 2020-08-10

## 2020-08-10 RX ORDER — APIXABAN 2.5 MG/1
1 TABLET, FILM COATED ORAL
Qty: 60 | Refills: 0
Start: 2020-08-10 | End: 2020-09-08

## 2020-08-10 RX ORDER — ADENOSINE 3 MG/ML
60 INJECTION INTRAVENOUS ONCE
Refills: 0 | Status: DISCONTINUED | OUTPATIENT
Start: 2020-08-10 | End: 2020-08-14

## 2020-08-10 RX ADMIN — PANTOPRAZOLE SODIUM 40 MILLIGRAM(S): 20 TABLET, DELAYED RELEASE ORAL at 05:19

## 2020-08-10 RX ADMIN — APIXABAN 5 MILLIGRAM(S): 2.5 TABLET, FILM COATED ORAL at 05:19

## 2020-08-10 RX ADMIN — SIMVASTATIN 40 MILLIGRAM(S): 20 TABLET, FILM COATED ORAL at 21:24

## 2020-08-10 RX ADMIN — CEFTRIAXONE 100 MILLIGRAM(S): 500 INJECTION, POWDER, FOR SOLUTION INTRAMUSCULAR; INTRAVENOUS at 06:18

## 2020-08-10 RX ADMIN — CLOPIDOGREL BISULFATE 75 MILLIGRAM(S): 75 TABLET, FILM COATED ORAL at 12:17

## 2020-08-10 RX ADMIN — TRAMADOL HYDROCHLORIDE 50 MILLIGRAM(S): 50 TABLET ORAL at 17:03

## 2020-08-10 RX ADMIN — ATENOLOL 50 MILLIGRAM(S): 25 TABLET ORAL at 05:19

## 2020-08-10 RX ADMIN — Medication 40 MILLIEQUIVALENT(S): at 12:18

## 2020-08-10 RX ADMIN — APIXABAN 5 MILLIGRAM(S): 2.5 TABLET, FILM COATED ORAL at 17:03

## 2020-08-10 RX ADMIN — Medication 180 MILLIGRAM(S): at 05:19

## 2020-08-10 RX ADMIN — GABAPENTIN 600 MILLIGRAM(S): 400 CAPSULE ORAL at 12:17

## 2020-08-10 RX ADMIN — Medication 12.5 GRAM(S): at 09:26

## 2020-08-10 NOTE — PROGRESS NOTE ADULT - ASSESSMENT
74 year old female with known CAD s/p CABG, hypertension, dyslipidemic and chronic back pain presented from the urgent clinic for management of atrial fibrillation with RVR.       # Afib with RVR - now sinus nadira 40-50   d/c atenolol and keep on cardizem 180 qday and monitor on tele   on eliquis Discussed benefits of starting anticoagulation to prevent strokes from Afib/Aflutter and risks involved in starting anticoagulantion including risk of bleeding, hemorrhagic stroke, GI bleed and even death. Pt agreed to start anticoagulation given benifits outweighs risk.  echo noted     #NSTEMI likely demand ischemia type 2: history of CABG on plavix. will get nuc stress test     #presumed UTI on rocephin urine culture neg   complete 7 days of abx can switch to po vantin on discharge     #DANGELO on CKD 3 improved     # Dyslipidemia  - Continue simvastatin     # Hypertension controlled     # Chronic back pain   - Keep Gabapentin, tramadol PRN    #Progress Note Handoff  Pending (specify):  stress test, monitor on tele   Family discussion:patient   Disposition: Home

## 2020-08-10 NOTE — PROGRESS NOTE ADULT - ASSESSMENT
Patient lying flat. No complaints. Afib last night. Now NSR. Getting adenocard thallium. Agree with AC. Need ambulate. Correct k if needed

## 2020-08-10 NOTE — PROGRESS NOTE ADULT - SUBJECTIVE AND OBJECTIVE BOX
Patient is a 74y old  Female who presents with a chief complaint of Palpitations (10 Aug 2020 10:46)      T(F): 97.6 (08-10-20 @ 12:56), Max: 98.3 (08-09-20 @ 20:22)  HR: 49 (08-10-20 @ 12:56)  BP: 118/60 (08-10-20 @ 12:56)  RR: 18 (08-10-20 @ 12:56)  SpO2: 96% (08-10-20 @ 07:57) (96% - 96%)    PHYSICAL EXAM:  GENERAL: NAD, well-groomed, well-developed  HEAD:  Atraumatic, Normocephalic  EYES: EOMI, PERRLA, conjunctiva and sclera clear  ENMT: No tonsillar erythema, exudates, or enlargement; Moist mucous membranes, Good dentition, No lesions  NECK: Supple, No JVD, Normal thyroid  NERVOUS SYSTEM:  Alert & Oriented X3,  Motor Strength 5/5 B/L upper and lower extremities  CHEST/LUNG: Clear to percussion bilaterally; No rales, rhonchi, wheezing, or rubs  HEART: Regular rate and rhythm; No murmurs, rubs, or gallops  ABDOMEN: Soft, Nontender, Nondistended; Bowel sounds present  EXTREMITIES:   No clubbing, cyanosis, or edema  LYMPH: No lymphadenopathy noted  SKIN: No rashes or lesions    labs  08-10    139  |  96<L>  |  26<H>  ----------------------------<  135<H>  3.5   |  33<H>  |  1.3    Ca    9.5      10 Aug 2020 05:23  Phos  2.7     08-10  Mg     1.7     08-10                            12.6   6.70  )-----------( 208      ( 10 Aug 2020 05:23 )             36.9       Culture - Urine (collected 07 Aug 2020 17:30)  Source: .Urine Clean Catch (Midstream)  Final Report (08 Aug 2020 23:07):    >=3 organisms. Probable collection contamination.    Culture - Blood (collected 07 Aug 2020 16:16)  Source: .Blood Blood-Peripheral  Preliminary Report (08 Aug 2020 23:01):    No growth to date.    Culture - Blood (collected 07 Aug 2020 16:16)  Source: .Blood Blood-Peripheral  Preliminary Report (08 Aug 2020 23:01):    No growth to date.              aDENosine Injectable (ADENOSCAN) 60 milliGRAM(s) IV Bolus once  apixaban 5 milliGRAM(s) Oral every 12 hours  cefTRIAXone   IVPB 1000 milliGRAM(s) IV Intermittent every 24 hours  chlorhexidine 4% Liquid 1 Application(s) Topical daily  clopidogrel Tablet 75 milliGRAM(s) Oral daily  diltiazem    milliGRAM(s) Oral daily  gabapentin 600 milliGRAM(s) Oral daily  lidocaine 2% Gel 1 Application(s) Topical once  pantoprazole    Tablet 40 milliGRAM(s) Oral before breakfast  simvastatin 40 milliGRAM(s) Oral at bedtime  traMADol 50 milliGRAM(s) Oral daily PRN

## 2020-08-10 NOTE — PROGRESS NOTE ADULT - ASSESSMENT
74 year old female with known CAD s/p CABG, hypertension, dyslipidemic and chronic back pain presented from the urgent clinic for management of atrial fibrillation with RVR.     # Afib with RVR - currently in NSR  - Episode of chest discomfort while in afib with RVR and mildly elevated troponin  - Keeping home Diltiazem 180mg daily   - TSH 1.48 normal   - started on Eliquis    #Hx CAD s/p CABG  #troponemia likely secondary to demand ischemia   - on home plavix, simvastatin   - holding home atenolol due to bradycardia, will keep cardizem   - troponins downtrended   - stress test NM in 2015 unremarkable  - patient will go for repeat nuclear stress test as per cardio    #Magnesium deficiency  #Hypokalemia - improved    - Mg low today, repleted with mag sulfate 2g IV today  - potassium normal today, no need to replete   - daily BMP     # DANGELO  - Baseline creatinine normal   - Differential includes pre-renal from afib with RVR, or secondary to infection  - Follow urine studies  - Trending BMP  - Holding ARB  - Holding chlorthalidone  - f/u urine culture    #Diarrhea   - improved  - reglan prn    # presumed UTI  - Please follow urine culture  - UA may be -ve secondary to taking antibiotics  - continue with ceftriaxone for now; will discharge on vantin to complete for 7 total days     # Dyslipidemia  - Continue simvastatin   - Follow morning lipid panel     # Hypertensive  - Holding ARB, Chlorthalidone  - Keeping atenolol, cardizem   - Might need to add nifedipine if BP uncontrolled or resume home meds if creatinine trends down     # Chronic back pain   - Keep Gabapentin, tramadol PRN    # Diet > DASH/TLC  # DVT > Lovenox 60mg bid

## 2020-08-10 NOTE — PROGRESS NOTE ADULT - SUBJECTIVE AND OBJECTIVE BOX
no chest pain   no sob   patient is sinus nadira on tele 40s-50s       Vital Signs Last 24 Hrs  T(C): 36.1 (10 Aug 2020 05:52), Max: 36.8 (09 Aug 2020 20:22)  T(F): 96.9 (10 Aug 2020 05:52), Max: 98.3 (09 Aug 2020 20:22)  HR: 55 (10 Aug 2020 05:52) (55 - 80)  BP: 120/58 (10 Aug 2020 05:52) (109/58 - 130/60)  BP(mean): --  RR: 19 (10 Aug 2020 05:52) (18 - 20)  SpO2: 96% (10 Aug 2020 07:57) (96% - 96%)    PHYSICAL EXAM:  GENERAL: NAD,  Pulm: Clear to auscultation bilaterally; No wheeze  CV: Regular rate and rhythm; No murmurs, rubs, or gallops  ABDOMEN: Soft, Nontender, Nondistended; Bowel sounds present  EXTREMITIES:  2+ Peripheral Pulses, No clubbing, cyanosis, or edema  PSYCH: AAOx3  NEUROLOGY: non-focal  SKIN: No rashes or lesions                          12.6   6.70  )-----------( 208      ( 10 Aug 2020 05:23 )             36.9     08-10    139  |  96<L>  |  26<H>  ----------------------------<  135<H>  3.5   |  33<H>  |  1.3    Ca    9.5      10 Aug 2020 05:23  Phos  2.7     08-10  Mg     1.7     08-10          CARDIAC MARKERS ( 08 Aug 2020 11:40 )  x     / 0.11 ng/mL / x     / x     / x            Culture - Urine (collected 07 Aug 2020 17:30)  Source: .Urine Clean Catch (Midstream)  Final Report (08 Aug 2020 23:07):    >=3 organisms. Probable collection contamination.    Culture - Blood (collected 07 Aug 2020 16:16)  Source: .Blood Blood-Peripheral  Preliminary Report (08 Aug 2020 23:01):    No growth to date.    Culture - Blood (collected 07 Aug 2020 16:16)  Source: .Blood Blood-Peripheral  Preliminary Report (08 Aug 2020 23:01):    No growth to date.    Culture - Urine (collected 07 Aug 2020 12:04)  Source: .Urine Clean Catch (Midstream)  Final Report (09 Aug 2020 13:07):    <10,000 CFU/mL Normal Urogenital Pretty    MEDICATIONS  (STANDING):  aDENosine Injectable (ADENOSCAN) 60 milliGRAM(s) IV Bolus once  apixaban 5 milliGRAM(s) Oral every 12 hours  cefTRIAXone   IVPB 1000 milliGRAM(s) IV Intermittent every 24 hours  chlorhexidine 4% Liquid 1 Application(s) Topical daily  clopidogrel Tablet 75 milliGRAM(s) Oral daily  diltiazem    milliGRAM(s) Oral daily  gabapentin 600 milliGRAM(s) Oral daily  lidocaine 2% Gel 1 Application(s) Topical once  magnesium sulfate  IVPB 2 Gram(s) IV Intermittent once  pantoprazole    Tablet 40 milliGRAM(s) Oral before breakfast  simvastatin 40 milliGRAM(s) Oral at bedtime    MEDICATIONS  (PRN):  traMADol 50 milliGRAM(s) Oral daily PRN Severe Pain (7 - 10)

## 2020-08-10 NOTE — PROGRESS NOTE ADULT - SUBJECTIVE AND OBJECTIVE BOX
SUBJECTIVE:    Patient is a 74y old Female who presents with a chief complaint of Palpitations (10 Aug 2020 13:15)    Currently admitted to medicine with the primary diagnosis of Septic shock     Today is hospital day 3d.     Overnight no events    This morning patient denies any complaints and is ambulating well. Patient feels much better compared to when she came in to ED.     PAST MEDICAL & SURGICAL HISTORY  Heart attack  HTN (hypertension)  CAD (coronary artery disease)  Chronic midline back pain, unspecified back location  S/P CABG (coronary artery bypass graft)  History of appendectomy   delivery delivered  H/O appendicitis  History of right wrist replacement      ALLERGIES:  penicillin (Hives)  tizanidine (Hives)    MEDICATIONS:  STANDING MEDICATIONS  aDENosine Injectable (ADENOSCAN) 60 milliGRAM(s) IV Bolus once  apixaban 5 milliGRAM(s) Oral every 12 hours  cefTRIAXone   IVPB 1000 milliGRAM(s) IV Intermittent every 24 hours  chlorhexidine 4% Liquid 1 Application(s) Topical daily  clopidogrel Tablet 75 milliGRAM(s) Oral daily  diltiazem    milliGRAM(s) Oral daily  gabapentin 600 milliGRAM(s) Oral daily  lidocaine 2% Gel 1 Application(s) Topical once  pantoprazole    Tablet 40 milliGRAM(s) Oral before breakfast  simvastatin 40 milliGRAM(s) Oral at bedtime    PRN MEDICATIONS  traMADol 50 milliGRAM(s) Oral daily PRN    VITALS:   T(F): 97.6  HR: 49  BP: 118/60  RR: 18  SpO2: 96%    LABS:                        12.6   6.70  )-----------( 208      ( 10 Aug 2020 05:23 )             36.9     08-10    139  |  96<L>  |  26<H>  ----------------------------<  135<H>  3.5   |  33<H>  |  1.3    Ca    9.5      10 Aug 2020 05:23  Phos  2.7     08-10  Mg     1.7     08-10                Culture - Urine (collected 07 Aug 2020 17:30)  Source: .Urine Clean Catch (Midstream)  Final Report (08 Aug 2020 23:07):    >=3 organisms. Probable collection contamination.          RADIOLOGY:    PHYSICAL EXAM:  GEN: No acute distress  PULM/CHEST: Clear to auscultation bilaterally, no rales, rhonchi or wheezes   CVS: Regular rate and rhythm, S1-S2, no murmurs  ABD: Soft, non-tender, non-distended, +BS  EXT: No edema  NEURO: AAOx3

## 2020-08-11 LAB
ANION GAP SERPL CALC-SCNC: 12 MMOL/L — SIGNIFICANT CHANGE UP (ref 7–14)
BUN SERPL-MCNC: 23 MG/DL — HIGH (ref 10–20)
CALCIUM SERPL-MCNC: 9.4 MG/DL — SIGNIFICANT CHANGE UP (ref 8.5–10.1)
CHLORIDE SERPL-SCNC: 97 MMOL/L — LOW (ref 98–110)
CO2 SERPL-SCNC: 30 MMOL/L — SIGNIFICANT CHANGE UP (ref 17–32)
CREAT SERPL-MCNC: 1.2 MG/DL — SIGNIFICANT CHANGE UP (ref 0.7–1.5)
GLUCOSE SERPL-MCNC: 133 MG/DL — HIGH (ref 70–99)
HCT VFR BLD CALC: 35.4 % — LOW (ref 37–47)
HGB BLD-MCNC: 11.9 G/DL — LOW (ref 12–16)
M PROTEIN 24H UR ELPH-MRATE: SIGNIFICANT CHANGE UP
MAGNESIUM SERPL-MCNC: 2 MG/DL — SIGNIFICANT CHANGE UP (ref 1.8–2.4)
MCHC RBC-ENTMCNC: 29.8 PG — SIGNIFICANT CHANGE UP (ref 27–31)
MCHC RBC-ENTMCNC: 33.6 G/DL — SIGNIFICANT CHANGE UP (ref 32–37)
MCV RBC AUTO: 88.5 FL — SIGNIFICANT CHANGE UP (ref 81–99)
NRBC # BLD: 0 /100 WBCS — SIGNIFICANT CHANGE UP (ref 0–0)
PHOSPHATE SERPL-MCNC: 3.3 MG/DL — SIGNIFICANT CHANGE UP (ref 2.1–4.9)
PLATELET # BLD AUTO: 227 K/UL — SIGNIFICANT CHANGE UP (ref 130–400)
POTASSIUM SERPL-MCNC: 3.9 MMOL/L — SIGNIFICANT CHANGE UP (ref 3.5–5)
POTASSIUM SERPL-SCNC: 3.9 MMOL/L — SIGNIFICANT CHANGE UP (ref 3.5–5)
PROT ?TM UR-MCNC: 16 MG/DL — HIGH (ref 0–12)
RBC # BLD: 4 M/UL — LOW (ref 4.2–5.4)
RBC # FLD: 13 % — SIGNIFICANT CHANGE UP (ref 11.5–14.5)
SODIUM SERPL-SCNC: 139 MMOL/L — SIGNIFICANT CHANGE UP (ref 135–146)
URINE CREATININE CALCULATION: SIGNIFICANT CHANGE UP G/24 H (ref 0.8–1.8)
WBC # BLD: 7.63 K/UL — SIGNIFICANT CHANGE UP (ref 4.8–10.8)
WBC # FLD AUTO: 7.63 K/UL — SIGNIFICANT CHANGE UP (ref 4.8–10.8)

## 2020-08-11 PROCEDURE — 99233 SBSQ HOSP IP/OBS HIGH 50: CPT

## 2020-08-11 RX ORDER — ENOXAPARIN SODIUM 100 MG/ML
60 INJECTION SUBCUTANEOUS EVERY 12 HOURS
Refills: 0 | Status: DISCONTINUED | OUTPATIENT
Start: 2020-08-11 | End: 2020-08-12

## 2020-08-11 RX ADMIN — CEFTRIAXONE 100 MILLIGRAM(S): 500 INJECTION, POWDER, FOR SOLUTION INTRAMUSCULAR; INTRAVENOUS at 06:04

## 2020-08-11 RX ADMIN — APIXABAN 5 MILLIGRAM(S): 2.5 TABLET, FILM COATED ORAL at 06:04

## 2020-08-11 RX ADMIN — TRAMADOL HYDROCHLORIDE 50 MILLIGRAM(S): 50 TABLET ORAL at 11:30

## 2020-08-11 RX ADMIN — CLOPIDOGREL BISULFATE 75 MILLIGRAM(S): 75 TABLET, FILM COATED ORAL at 11:29

## 2020-08-11 RX ADMIN — Medication 180 MILLIGRAM(S): at 06:04

## 2020-08-11 RX ADMIN — SIMVASTATIN 40 MILLIGRAM(S): 20 TABLET, FILM COATED ORAL at 21:29

## 2020-08-11 RX ADMIN — GABAPENTIN 600 MILLIGRAM(S): 400 CAPSULE ORAL at 11:29

## 2020-08-11 RX ADMIN — ENOXAPARIN SODIUM 60 MILLIGRAM(S): 100 INJECTION SUBCUTANEOUS at 17:04

## 2020-08-11 RX ADMIN — TRAMADOL HYDROCHLORIDE 50 MILLIGRAM(S): 50 TABLET ORAL at 10:09

## 2020-08-11 RX ADMIN — PANTOPRAZOLE SODIUM 40 MILLIGRAM(S): 20 TABLET, DELAYED RELEASE ORAL at 06:04

## 2020-08-11 NOTE — PROGRESS NOTE ADULT - SUBJECTIVE AND OBJECTIVE BOX
SUBJECTIVE:    Patient is a 74y old Female who presents with a chief complaint of Palpitations (11 Aug 2020 12:52)    Currently admitted to medicine with the primary diagnosis of Septic shock     Today is hospital day 4d.    Overnight no events.     This morning patient was resting comfortably in bed and had no complaints.     PAST MEDICAL & SURGICAL HISTORY  Heart attack  HTN (hypertension)  CAD (coronary artery disease)  Chronic midline back pain, unspecified back location  S/P CABG (coronary artery bypass graft)  History of appendectomy   delivery delivered  H/O appendicitis  History of right wrist replacement      ALLERGIES:  penicillin (Hives)  tizanidine (Hives)    MEDICATIONS:  STANDING MEDICATIONS  aDENosine Injectable (ADENOSCAN) 60 milliGRAM(s) IV Bolus once  cefTRIAXone   IVPB 1000 milliGRAM(s) IV Intermittent every 24 hours  chlorhexidine 4% Liquid 1 Application(s) Topical daily  clopidogrel Tablet 75 milliGRAM(s) Oral daily  enoxaparin Injectable 60 milliGRAM(s) SubCutaneous every 12 hours  gabapentin 600 milliGRAM(s) Oral daily  lidocaine 2% Gel 1 Application(s) Topical once  pantoprazole    Tablet 40 milliGRAM(s) Oral before breakfast  simvastatin 40 milliGRAM(s) Oral at bedtime    PRN MEDICATIONS  traMADol 50 milliGRAM(s) Oral daily PRN    VITALS:   T(F): 96  HR: 57  BP: 152/67  RR: 18  SpO2: 97%    LABS:                        11.9   7.63  )-----------( 227      ( 11 Aug 2020 05:20 )             35.4     08-11    139  |  97<L>  |  23<H>  ----------------------------<  133<H>  3.9   |  30  |  1.2    Ca    9.4      11 Aug 2020 05:20  Phos  3.3     08-11  Mg     2.0     08-11      RADIOLOGY:    no new scans     PHYSICAL EXAM:  GEN: No acute distress  PULM/CHEST: Clear to auscultation bilaterally, no rales, rhonchi or wheezes   CVS: Regular rate and rhythm, S1-S2, no murmurs  ABD: Soft, non-tender, non-distended, +BS  EXT: No edema  NEURO: AAOx3

## 2020-08-11 NOTE — PROGRESS NOTE ADULT - ASSESSMENT
74 year old female with known CAD s/p CABG, hypertension, dyslipidemic and chronic back pain presented from the urgent clinic for management of atrial fibrillation with RVR.       # Afib with RVR - now sinus nadira 50s   d/c atenolol and monitor of cardizem if HR goes up will resume at lower dose   on eliquis Discussed benefits of starting anticoagulation to prevent strokes from Afib/Aflutter and risks involved in starting anticoagulantion including risk of bleeding, hemorrhagic stroke, GI bleed and even death. Pt agreed to start anticoagulation given benifits outweighs risk.  echo noted     #NSTEMI likely demand ischemia type 2: history of CABG on plavix. nuc stress test positive for ischemia cardiology will plan for cath after ID clearance     #presumed UTI on rocephin urine culture neg   today is day 5 of rocephin. cardiology requested ID clearance for cath. I do not see any contraindication to proceed with cath. will get ID as per cardiology request     #DANGELO on CKD 3 improved     # Dyslipidemia  - Continue simvastatin     # Hypertension controlled       #Progress Note Handoff  Pending (specify):  catht, monitor on tele   Family discussion: patient   Disposition: Home

## 2020-08-11 NOTE — PROGRESS NOTE ADULT - ASSESSMENT
Patient lying flat. No complaints. Adenocard thallium post ischemia. Patient now claims recent brief chest pain. She needs a cardiac cath. If ok with ID. Will schedule for thurs. Ho;d thurs am lovenox

## 2020-08-11 NOTE — PROGRESS NOTE ADULT - ASSESSMENT
74 year old female with known CAD s/p CABG, hypertension, dyslipidemic and chronic back pain presented from the urgent clinic for management of atrial fibrillation with RVR.     # Afib with RVR - currently in NSR  - Episode of chest discomfort while in afib with RVR and mildly elevated troponin  - holding atenolol and diltiazem given bradycardia, will monitor HR tomorrow off meds   - TSH 1.48 normal   - started on Eliquis    #Hx CAD s/p CABG  #troponemia likely secondary to demand ischemia   - on home plavix, simvastatin   - holding home atenolol due to bradycardia, will keep cardizem   - troponins downtrended   - stress test NM in 2015 unremarkable  - Stress test 8/10 was positive: moderate to large sized reversible defect involving inferior, inferolateral, and lateral left ventricular walls consistent with ischemia   - spoke to Dr. Marte; will place ID consult regarding UTI to get cardiac clearance and if we can d/c ceftriaxone    #Magnesium deficiency - resolved  #Hypokalemia - resolved  - K and Mg uptrended today  - will monitor BMP     # DANGELO likely pre-renal secondary to dehydration due to vomiting/diarrhea vs. afib with RVR   - Baseline creatinine normal; Cr downtrended today   - Trending BMP  - Holding ARB and chlorthalidone    #Diarrhea - resolved  - reglan prn    # presumed UTI  - Please follow urine culture  - UA may be -ve secondary to taking antibiotics  - continue with ceftriaxone for now; will discharge on vantin to complete for 7 total days   - ID consult placed     # Hyperlipidemia   - Continue simvastatin   - lipid panel was unremarkable     # new Type 2 DM   - A1C 6.7 with morning BMP glucose above 126  - will continue to monitor glucose via BMP; if persistently > 180 will start insulin   - f/u with PCP with starting metformin/oral antidiabetics     # Hypertensive  - Holding ARB, Chlorthalidone, atenolol  - may resume home meds tomorrow     # Chronic back pain   - Keep Gabapentin, tramadol PRN    # Diet > DASH/TLC  # DVT > Lovenox 60mg bid

## 2020-08-11 NOTE — PROGRESS NOTE ADULT - SUBJECTIVE AND OBJECTIVE BOX
no chest pain   no sob   had stress test yesterday and positive for ischemia     Vital Signs Last 24 Hrs  T(C): 36.3 (11 Aug 2020 05:45), Max: 36.4 (10 Aug 2020 12:56)  T(F): 97.3 (11 Aug 2020 05:45), Max: 97.6 (10 Aug 2020 12:56)  HR: 51 (11 Aug 2020 05:45) (49 - 60)  BP: 140/63 (11 Aug 2020 05:45) (118/60 - 151/82)  BP(mean): --  RR: 18 (11 Aug 2020 05:45) (18 - 18)  SpO2: 97% (11 Aug 2020 08:28) (97% - 97%)    PHYSICAL EXAM:  GENERAL: NAD, well-developed  HEAD:  Atraumatic, Normocephalic  EYES: EOMI, PERRLA, conjunctiva and sclera clear  NECK: Supple, No JVD  Pulm: Clear to auscultation bilaterally; No wheeze  CV: Regular rate and rhythm; No murmurs, rubs, or gallops  GI: Soft, Nontender, Nondistended; Bowel sounds present  EXTREMITIES:  2+ Peripheral Pulses, No clubbing, cyanosis, or edema  PSYCH: AAOx3  NEUROLOGY: non-focal  SKIN: No rashes or lesions                          11.9   7.63  )-----------( 227      ( 11 Aug 2020 05:20 )             35.4     08-11    139  |  97<L>  |  23<H>  ----------------------------<  133<H>  3.9   |  30  |  1.2    Ca    9.4      11 Aug 2020 05:20  Phos  3.3     08-11  Mg     2.0     08-11               MEDICATIONS  (STANDING):  aDENosine Injectable (ADENOSCAN) 60 milliGRAM(s) IV Bolus once  apixaban 5 milliGRAM(s) Oral every 12 hours  cefTRIAXone   IVPB 1000 milliGRAM(s) IV Intermittent every 24 hours  chlorhexidine 4% Liquid 1 Application(s) Topical daily  clopidogrel Tablet 75 milliGRAM(s) Oral daily  gabapentin 600 milliGRAM(s) Oral daily  lidocaine 2% Gel 1 Application(s) Topical once  pantoprazole    Tablet 40 milliGRAM(s) Oral before breakfast  simvastatin 40 milliGRAM(s) Oral at bedtime    MEDICATIONS  (PRN):  traMADol 50 milliGRAM(s) Oral daily PRN Severe Pain (7 - 10)

## 2020-08-11 NOTE — PROGRESS NOTE ADULT - SUBJECTIVE AND OBJECTIVE BOX
Patient is a 74y old  Female who presents with a chief complaint of Palpitations (11 Aug 2020 09:24)      T(F): 97.3 (08-11-20 @ 05:45), Max: 97.6 (08-10-20 @ 12:56)  HR: 51 (08-11-20 @ 05:45)  BP: 140/63 (08-11-20 @ 05:45)  RR: 18 (08-11-20 @ 05:45)  SpO2: 97% (08-11-20 @ 08:28) (97% - 97%)    PHYSICAL EXAM:  GENERAL: NAD, well-groomed, well-developed  HEAD:  Atraumatic, Normocephalic  EYES: EOMI, PERRLA, conjunctiva and sclera clear  ENMT: No tonsillar erythema, exudates, or enlargement; Moist mucous membranes, Good dentition, No lesions  NECK: Supple, No JVD, Normal thyroid  NERVOUS SYSTEM:  Alert & Oriented X3,  Motor Strength 5/5 B/L upper and lower extremities  CHEST/LUNG: Clear to percussion bilaterally; No rales, rhonchi, wheezing, or rubs  HEART: Regular rate and rhythm; No murmurs, rubs, or gallops  ABDOMEN: Soft, Nontender, Nondistended; Bowel sounds present  EXTREMITIES:   No clubbing, cyanosis, or edema  LYMPH: No lymphadenopathy noted  SKIN: No rashes or lesions    labs  08-11    139  |  97<L>  |  23<H>  ----------------------------<  133<H>  3.9   |  30  |  1.2    Ca    9.4      11 Aug 2020 05:20  Phos  3.3     08-11  Mg     2.0     08-11                            11.9   7.63  )-----------( 227      ( 11 Aug 2020 05:20 )             35.4               aDENosine Injectable (ADENOSCAN) 60 milliGRAM(s) IV Bolus once  cefTRIAXone   IVPB 1000 milliGRAM(s) IV Intermittent every 24 hours  chlorhexidine 4% Liquid 1 Application(s) Topical daily  clopidogrel Tablet 75 milliGRAM(s) Oral daily  enoxaparin Injectable 60 milliGRAM(s) SubCutaneous every 12 hours  gabapentin 600 milliGRAM(s) Oral daily  lidocaine 2% Gel 1 Application(s) Topical once  pantoprazole    Tablet 40 milliGRAM(s) Oral before breakfast  simvastatin 40 milliGRAM(s) Oral at bedtime  traMADol 50 milliGRAM(s) Oral daily PRN

## 2020-08-12 LAB
ANION GAP SERPL CALC-SCNC: 14 MMOL/L — SIGNIFICANT CHANGE UP (ref 7–14)
BUN SERPL-MCNC: 22 MG/DL — HIGH (ref 10–20)
CALCIUM SERPL-MCNC: 9.7 MG/DL — SIGNIFICANT CHANGE UP (ref 8.5–10.1)
CHLORIDE SERPL-SCNC: 96 MMOL/L — LOW (ref 98–110)
CO2 SERPL-SCNC: 28 MMOL/L — SIGNIFICANT CHANGE UP (ref 17–32)
CREAT SERPL-MCNC: 1.3 MG/DL — SIGNIFICANT CHANGE UP (ref 0.7–1.5)
CULTURE RESULTS: SIGNIFICANT CHANGE UP
CULTURE RESULTS: SIGNIFICANT CHANGE UP
GLUCOSE SERPL-MCNC: 158 MG/DL — HIGH (ref 70–99)
HCT VFR BLD CALC: 39.2 % — SIGNIFICANT CHANGE UP (ref 37–47)
HGB BLD-MCNC: 13.2 G/DL — SIGNIFICANT CHANGE UP (ref 12–16)
MAGNESIUM SERPL-MCNC: 1.7 MG/DL — LOW (ref 1.8–2.4)
MCHC RBC-ENTMCNC: 29.9 PG — SIGNIFICANT CHANGE UP (ref 27–31)
MCHC RBC-ENTMCNC: 33.7 G/DL — SIGNIFICANT CHANGE UP (ref 32–37)
MCV RBC AUTO: 88.7 FL — SIGNIFICANT CHANGE UP (ref 81–99)
NRBC # BLD: 0 /100 WBCS — SIGNIFICANT CHANGE UP (ref 0–0)
PLATELET # BLD AUTO: 311 K/UL — SIGNIFICANT CHANGE UP (ref 130–400)
POTASSIUM SERPL-MCNC: 3.9 MMOL/L — SIGNIFICANT CHANGE UP (ref 3.5–5)
POTASSIUM SERPL-SCNC: 3.9 MMOL/L — SIGNIFICANT CHANGE UP (ref 3.5–5)
RBC # BLD: 4.42 M/UL — SIGNIFICANT CHANGE UP (ref 4.2–5.4)
RBC # FLD: 12.9 % — SIGNIFICANT CHANGE UP (ref 11.5–14.5)
SARS-COV-2 RNA SPEC QL NAA+PROBE: SIGNIFICANT CHANGE UP
SODIUM SERPL-SCNC: 138 MMOL/L — SIGNIFICANT CHANGE UP (ref 135–146)
SPECIMEN SOURCE: SIGNIFICANT CHANGE UP
SPECIMEN SOURCE: SIGNIFICANT CHANGE UP
WBC # BLD: 10.47 K/UL — SIGNIFICANT CHANGE UP (ref 4.8–10.8)
WBC # FLD AUTO: 10.47 K/UL — SIGNIFICANT CHANGE UP (ref 4.8–10.8)

## 2020-08-12 PROCEDURE — 99233 SBSQ HOSP IP/OBS HIGH 50: CPT

## 2020-08-12 RX ORDER — HYDRALAZINE HCL 50 MG
10 TABLET ORAL ONCE
Refills: 0 | Status: COMPLETED | OUTPATIENT
Start: 2020-08-12 | End: 2020-08-12

## 2020-08-12 RX ORDER — NIFEDIPINE 30 MG
60 TABLET, EXTENDED RELEASE 24 HR ORAL DAILY
Refills: 0 | Status: DISCONTINUED | OUTPATIENT
Start: 2020-08-12 | End: 2020-08-14

## 2020-08-12 RX ORDER — MAGNESIUM SULFATE 500 MG/ML
2 VIAL (ML) INJECTION ONCE
Refills: 0 | Status: COMPLETED | OUTPATIENT
Start: 2020-08-12 | End: 2020-08-12

## 2020-08-12 RX ADMIN — SIMVASTATIN 40 MILLIGRAM(S): 20 TABLET, FILM COATED ORAL at 22:01

## 2020-08-12 RX ADMIN — CLOPIDOGREL BISULFATE 75 MILLIGRAM(S): 75 TABLET, FILM COATED ORAL at 11:44

## 2020-08-12 RX ADMIN — Medication 10 MILLIGRAM(S): at 05:38

## 2020-08-12 RX ADMIN — GABAPENTIN 600 MILLIGRAM(S): 400 CAPSULE ORAL at 11:44

## 2020-08-12 RX ADMIN — CEFTRIAXONE 100 MILLIGRAM(S): 500 INJECTION, POWDER, FOR SOLUTION INTRAMUSCULAR; INTRAVENOUS at 05:38

## 2020-08-12 RX ADMIN — Medication 60 MILLIGRAM(S): at 11:44

## 2020-08-12 RX ADMIN — PANTOPRAZOLE SODIUM 40 MILLIGRAM(S): 20 TABLET, DELAYED RELEASE ORAL at 05:38

## 2020-08-12 RX ADMIN — ENOXAPARIN SODIUM 60 MILLIGRAM(S): 100 INJECTION SUBCUTANEOUS at 05:38

## 2020-08-12 RX ADMIN — Medication 50 GRAM(S): at 12:34

## 2020-08-12 NOTE — PROGRESS NOTE ADULT - SUBJECTIVE AND OBJECTIVE BOX
Patient is a 74y old  Female who presents with a chief complaint of Palpitations (12 Aug 2020 09:57)      T(F): 97.2 (08-12-20 @ 04:23), Max: 97.2 (08-12-20 @ 04:23)  HR: 71 (08-12-20 @ 11:45)  BP: 174/74 (08-12-20 @ 11:45)  RR: 19 (08-12-20 @ 07:45)  SpO2: 97% (08-12-20 @ 07:45) (97% - 97%)    PHYSICAL EXAM:  GENERAL: NAD, well-groomed, well-developed  HEAD:  Atraumatic, Normocephalic  EYES: EOMI, PERRLA, conjunctiva and sclera clear  ENMT: No tonsillar erythema, exudates, or enlargement; Moist mucous membranes, Good dentition, No lesions  NECK: Supple, No JVD, Normal thyroid  NERVOUS SYSTEM:  Alert & Oriented X3,  Motor Strength 5/5 B/L upper and lower extremities  CHEST/LUNG: Clear to percussion bilaterally; No rales, rhonchi, wheezing, or rubs  HEART: Regular rate and rhythm; No murmurs, rubs, or gallops  ABDOMEN: Soft, Nontender, Nondistended; Bowel sounds present  EXTREMITIES:   No clubbing, cyanosis, or edema  LYMPH: No lymphadenopathy noted  SKIN: No rashes or lesions    labs  08-12    138  |  96<L>  |  22<H>  ----------------------------<  158<H>  3.9   |  28  |  1.3    Ca    9.7      12 Aug 2020 07:05  Phos  3.3     08-11  Mg     1.7     08-12                            13.2   10.47 )-----------( 311      ( 12 Aug 2020 07:05 )             39.2               aDENosine Injectable (ADENOSCAN) 60 milliGRAM(s) IV Bolus once  chlorhexidine 4% Liquid 1 Application(s) Topical daily  clopidogrel Tablet 75 milliGRAM(s) Oral daily  gabapentin 600 milliGRAM(s) Oral daily  lidocaine 2% Gel 1 Application(s) Topical once  magnesium sulfate  IVPB 2 Gram(s) IV Intermittent once  NIFEdipine XL 60 milliGRAM(s) Oral daily  pantoprazole    Tablet 40 milliGRAM(s) Oral before breakfast  simvastatin 40 milliGRAM(s) Oral at bedtime  traMADol 50 milliGRAM(s) Oral daily PRN

## 2020-08-12 NOTE — PROGRESS NOTE ADULT - ASSESSMENT
74 year old female with known CAD s/p CABG, hypertension, dyslipidemic and chronic back pain presented from the urgent clinic for management of atrial fibrillation with RVR.       # Afib with RVR -sinus nadira much improved atenolol and cardizem stopped   d/c atenolol and monitor off cardizem if HR goes up will resume at lower dose   on eliquis Discussed benefits of starting anticoagulation to prevent strokes from Afib/Aflutter and risks involved in starting anticoagulantion including risk of bleeding, hemorrhagic stroke, GI bleed and even death. Pt agreed to start anticoagulation given benifits outweighs risk.  echo noted     #NSTEMI likely demand ischemia type 2: history of CABG on plavix. nuc stress test positive for ischemia cardiology will plan for cath tomorrow. NPO after MN     #hypomagnesemia replete     #presumed UTI on rocephin urine culture neg   today is day 6 of rocephin will stop abx.  cardiology requested ID clearance for cath. I do not see any contraindication to proceed with cath. will get ID as per cardiology request     #DANGELO on CKD 3 improved     # Dyslipidemia  - Continue simvastatin     # Hypertension controlled       #Progress Note Handoff  Pending (specify):  cath  monitor on tele   Family discussion: patient   Disposition: Home 74 year old female with known CAD s/p CABG, hypertension, dyslipidemic and chronic back pain presented from the urgent clinic for management of atrial fibrillation with RVR.       # Afib with RVR -sinus nadira much improved atenolol and cardizem stopped   d/c atenolol and monitor off cardizem if HR goes up will resume at lower dose   on eliquis Discussed benefits of starting anticoagulation to prevent strokes from Afib/Aflutter and risks involved in starting anticoagulantion including risk of bleeding, hemorrhagic stroke, GI bleed and even death. Pt agreed to start anticoagulation given benifits outweighs risk.  echo noted     #NSTEMI likely demand ischemia type 2: history of CABG on plavix. nuc stress test positive for ischemia cardiology will plan for cath tomorrow. NPO after MN     #hypomagnesemia replete     #presumed UTI on rocephin urine culture neg   today is day 6 of rocephin will stop abx.  cardiology requested ID clearance for cath. I do not see any contraindication to proceed with cath. will get ID as per cardiology request     #DANGELO on CKD 3 improved     # Dyslipidemia  - Continue simvastatin     # Hypertension was uncontrolled since we stopped atenolol and cardizem for bradycardia. procardia 60XL was added and BP is better controlled       #Progress Note Handoff  Pending (specify):  cath  monitor on tele   Family discussion: patient   Disposition: Home

## 2020-08-12 NOTE — CONSULT NOTE ADULT - ASSESSMENT
Patient with sepsis. She was hypotensive. She developed transient afib. Afib probably from sepsis. Now in nsr. Positive troponin possibly type 2 MI. Need culture rx sepsis. Repeat enzymes and ekg. She needs a echo. Correct k. Follow labs.  She claims recent neg stress test.
Cardiology: Dr Marte    Assessment: 73 yo F with hx of CAD sp CABG, HTN, HLD admitted for new onset AF RVR, now in NSR. Pt admitted with dysuria and found to be in septic shock with hypotension and admitted to ICU. Pt now improving with stable VS, spontaneously converted to NSR.    Impression:  New Onset AF RVR, now NSR (likely 2/2 sepsis)  CHADSVASC 4 (female, HTN, MI)  Septic Shock  Dysuria  CAD sp CABG    Plan:  - Cont Cardizem 180mg and Atenolol  - Switch Lovenox to DOAC  - 2D Echo  - Check TSH, free T4  - Urine Cx  - Check Mg level  - Hypokalema 3.1, please replete  - Consider ischemic workup given elevated troponin  - Cont tele monitoring  - Will discuss further plan with attending
Impression:  Sepsis present on admission  Afib with RVR  elevated trops  UTI  diarrhea  DANGELO      PLAN:    CNS: avoid sedation    HEENT:  Oral care    PULMONARY:  HOB @ 45 degrees, Keep sats>94%    CARDIOVASCULAR: keep i=o, avoid overload, Rate control continue cardizem,  repeat trops  switch lovenox to eliquis. repeat EKG. Check 2d echo, F/u cardio.    GI: GI prophylaxis   , diarrhea improved , reglan PRN, switch to protonix.                                  Feeding oral feeds    RENAL:  F/u  lytes.  Correct as needed. accurate I/O. BMP in am    INFECTIOUS DISEASE: continue rocephine, f/u cultures.    HEMATOLOGICAL:  DVT prophylaxis. switch to eliquis.     ENDOCRINE:  Follow up FS.  Insulin protocol if needed.    MUSCULOSKELETAL: OOB to chair    CODE STATUS: FULL CODE    DISPOSITION: downgrade to tele.
74 year old female with known CAD s/p CABG, hypertension, dyslipidemic and chronic back pain presented from the urgent clinic for management of atrial fibrillation with RVR.     IMPRESSION;   No ongoing pyelonephritis  No pyuria  BCx/UCx 8/7 NGTD  WBC 7.6    RECOMMENDATIONS;  No ABx  From ID standpoint could proceed with cardiac intervention

## 2020-08-12 NOTE — PROGRESS NOTE ADULT - ASSESSMENT
74 year old female with known CAD s/p CABG, hypertension, dyslipidemic and chronic back pain presented from the urgent clinic for management of atrial fibrillation with RVR.     # Afib with RVR - currently in NSR  - Episode of chest discomfort while in afib with RVR and mildly elevated troponin  - holding atenolol and diltiazem given bradycardia, will monitor HR and if uncontrolled tomorrow will start cardizem at lower dose   - TSH 1.48 normal   - started on Eliquis    #Hx CAD s/p CABG  #troponemia likely secondary to demand ischemia   - on home plavix, simvastatin   - holding home atenolol and cardizem due to bradycardia  - troponins downtrended   - stress test NM in 2015 unremarkable  - Stress test 8/10 was positive: moderate to large sized reversible defect involving inferior, inferolateral, and lateral left ventricular walls consistent with ischemia   - spoke to Dr. Marte; will place ID consult regarding UTI to get cardiac clearance and if we can d/c ceftriaxone    #Magnesium deficiency - resolved  #Hypokalemia - resolved  - K and Mg uptrended today  - will monitor BMP     # DANGELO likely pre-renal secondary to dehydration due to vomiting/diarrhea vs. afib with RVR   - Baseline creatinine normal; Cr downtrended today   - Trending BMP  - Holding ARB and chlorthalidone    #Diarrhea - resolved  - reglan prn    # presumed UTI  - Please follow urine culture  - UA may be -ve secondary to taking antibiotics  - continue with ceftriaxone for now; will discharge on vantin to complete for 7 total days   - ID consult placed     # Hyperlipidemia   - Continue simvastatin   - lipid panel was unremarkable     # new Type 2 DM   - A1C 6.7 with morning BMP glucose above 126  - will continue to monitor glucose via BMP; if persistently > 180 will start insulin   - f/u with PCP with starting metformin/oral antidiabetics     # Hypertensive  - Holding ARB, Chlorthalidone, atenolol  - may resume home meds tomorrow     # Chronic back pain   - Keep Gabapentin, tramadol PRN    # Diet > DASH/TLC  # DVT > Lovenox 60mg bid 74 year old female with known CAD s/p CABG, hypertension, dyslipidemic and chronic back pain presented from the urgent clinic for management of atrial fibrillation with RVR.     # Afib with RVR - currently in NSR  - Episode of chest discomfort while in afib with RVR and mildly elevated troponin  - holding atenolol and diltiazem given bradycardia, will monitor HR and if uncontrolled tomorrow will start cardizem at lower dose   - TSH 1.48 normal   - started on Eliquis    #Hx CAD s/p CABG  #troponemia likely secondary to demand ischemia   - on home plavix, simvastatin   - holding home atenolol and cardizem due to bradycardia  - troponins downtrended   - stress test NM in 2015 unremarkable  - Stress test 8/10 was positive: moderate to large sized reversible defect involving inferior, inferolateral, and lateral left ventricular walls consistent with ischemia   - cath will be done tomorrow, ID cleared patient for cath    #Magnesium deficiency - resolved  #Hypokalemia - resolved  - K and Mg normal   - will monitor BMP     # DANGELO likely pre-renal secondary to dehydration due to vomiting/diarrhea vs. afib with RVR   - Baseline creatinine normal; Cr downtrended today   - Trending BMP  - Holding home candesartan and chlorthalidone    # presumed UTI  - Please follow urine culture  - UA may be -ve secondary to taking antibiotics  - continue with ceftriaxone for now; will discharge on vantin to complete for 7 total days   - ID consult: no abx needed, clear for catheterization     # Hyperlipidemia   - Continue simvastatin   - lipid panel was unremarkable     # new Type 2 DM   - A1C 6.7 with morning BMP glucose above 126  - will continue to monitor glucose via BMP; if persistently > 180 will start insulin   - f/u with PCP with starting metformin/oral antidiabetics     # Hypertensive  - Holding ARB, Chlorthalidone, atenolol as above  - nifedipine 60mg daily added today   - monitor BP tomorrow     # Chronic back pain   - Keep Gabapentin, tramadol PRN    #Diarrhea resolved  - reglan prn     # Diet > DASH/TLC  # DVT > Lovenox 60mg bid  Code status: full code   GI ppx: not needed

## 2020-08-12 NOTE — PROGRESS NOTE ADULT - SUBJECTIVE AND OBJECTIVE BOX
SUBJECTIVE:    Patient is a 74y old Female who presents with a chief complaint of Palpitations (12 Aug 2020 13:06)    Currently admitted to medicine with the primary diagnosis of Septic shock     Today is hospital day 5d.     No events overnight.    Patient denies CP, SOB, paliptations, fevers. Patient sitting up on side of bed comfortably and has no other complaints.     PAST MEDICAL & SURGICAL HISTORY  Heart attack  HTN (hypertension)  CAD (coronary artery disease)  Chronic midline back pain, unspecified back location  S/P CABG (coronary artery bypass graft)  History of appendectomy   delivery delivered  H/O appendicitis  History of right wrist replacement      ALLERGIES:  penicillin (Hives)  tizanidine (Hives)    MEDICATIONS:  STANDING MEDICATIONS  aDENosine Injectable (ADENOSCAN) 60 milliGRAM(s) IV Bolus once  chlorhexidine 4% Liquid 1 Application(s) Topical daily  clopidogrel Tablet 75 milliGRAM(s) Oral daily  gabapentin 600 milliGRAM(s) Oral daily  lidocaine 2% Gel 1 Application(s) Topical once  NIFEdipine XL 60 milliGRAM(s) Oral daily  pantoprazole    Tablet 40 milliGRAM(s) Oral before breakfast  simvastatin 40 milliGRAM(s) Oral at bedtime    PRN MEDICATIONS  traMADol 50 milliGRAM(s) Oral daily PRN    VITALS:   T(F): 97.6  HR: 65  BP: 162/70  RR: 18  SpO2: 97%    LABS:                        13.2   10.47 )-----------( 311      ( 12 Aug 2020 07:05 )             39.2     08-12    138  |  96<L>  |  22<H>  ----------------------------<  158<H>  3.9   |  28  |  1.3    Ca    9.7      12 Aug 2020 07:05  Phos  3.3     08-11  Mg     1.7     08-12        PHYSICAL EXAM:  GEN: No acute distress  PULM/CHEST: Clear to auscultation bilaterally, no rales, rhonchi or wheezes   CVS: Regular rate and rhythm, S1-S2, no murmurs  ABD: Soft, non-tender, non-distended, +BS  EXT: No edema  NEURO: AAOx3

## 2020-08-12 NOTE — CONSULT NOTE ADULT - SUBJECTIVE AND OBJECTIVE BOX
CARDIOLOGY CONSULT NOTE     CHIEF COMPLAINT/REASON FOR CONSULT:    HPI:  74 year old female patient presented from the urgent clinic for management of atrial fibrillation with RVR.     Known CAD sp CABG, hypertension, dyslipidemic, chronic back pain.     Current history goes back to 4 days ptp when the patient began experiencing dysuria, frequency and urgency. Patient seeked medical attention 2 days PTA for the dysuria and was discharged on an unknown oral antibiotic.   1 day pta, patient develops diarrhea, non-bloody, non-mucoid, large volume x5 episodes associated with non-billous non-bloody x10 episodes of vomiting. Patient returned to urgent care for assessment. During her visit, patient experienced acute onset  chest discomfort localized to the retrosternal region and non-radiating. No diaphoresis, no dizziness, no syncope or presyncope. An ECG was done in the urgent care which showed afib with RVR, patient hypotensive during the episode, transfered by EMS to [facility] for management. (07 Aug 2020 22:23)      PAST MEDICAL & SURGICAL HISTORY:  Heart attack  HTN (hypertension)  CAD (coronary artery disease)  Chronic midline back pain, unspecified back location  S/P CABG (coronary artery bypass graft)  History of appendectomy   delivery delivered  H/O appendicitis  History of right wrist replacement      Cardiac Risks:   [x ]HTN, [ ] DM, [ ] Smoking, [ ] FH,  [x ] Lipids        MEDICATIONS:  MEDICATIONS  (STANDING):  ATENolol  Tablet 50 milliGRAM(s) Oral daily  cefTRIAXone   IVPB 1000 milliGRAM(s) IV Intermittent every 24 hours  chlorhexidine 4% Liquid 1 Application(s) Topical daily  clopidogrel Tablet 75 milliGRAM(s) Oral daily  diltiazem    milliGRAM(s) Oral daily  enoxaparin Injectable 60 milliGRAM(s) SubCutaneous every 12 hours  gabapentin 600 milliGRAM(s) Oral daily  pantoprazole  Injectable 40 milliGRAM(s) IV Push daily  potassium chloride    Tablet ER 40 milliEquivalent(s) Oral every 4 hours  simvastatin 40 milliGRAM(s) Oral at bedtime      FAMILY HISTORY:  No pertinent family history in first degree relatives      SOCIAL HISTORY:      [ ] Marital status   Allergies    penicillin (Hives)  tizanidine (Hives)        	    REVIEW OF SYSTEMS:  CONSTITUTIONAL: No fever, weight loss, or fatigue  EYES: No eye pain, visual disturbances, or discharge  ENMT:  No difficulty hearing, tinnitus, vertigo; No sinus or throat pain  NECK: No pain or stiffness  RESPIRATORY: No cough, wheezing, chills or hemoptysis; No Shortness of Breath  CARDIOVASCULAR: See above  GASTROINTESTINAL: No abdominal or epigastric pain. No nausea, vomiting, or hematemesis; No diarrhea or constipation. No melena or hematochezia.  GENITOURINARY: No dysuria, frequency, hematuria, or incontinence  NEUROLOGICAL: No headaches, memory loss, loss of strength, numbness, or tremors  SKIN: No itching, burning, rashes, or lesions   	        PHYSICAL EXAM:  T(C): 36.4 (20 @ 08:00), Max: 39.3 (20 @ 04:00)  HR: 60 (20 @ 09:00) (54 - 129)  BP: 125/58 (20 @ 08:00) (71/43 - 167/54)  RR: 25 (20 @ 09:00) (0 - 38)  SpO2: 100% (20 @ 09:00) (94% - 100%)  Wt(kg): --  I&O's Summary    07 Aug 2020 07:  -  08 Aug 2020 07:00  --------------------------------------------------------  IN: 2310 mL / OUT: 250 mL / NET: 2060 mL    08 Aug 2020 07:  -  08 Aug 2020 09:38  --------------------------------------------------------  IN: 120 mL / OUT: 0 mL / NET: 120 mL        Appearance: Normal	  Psychiatry: A & O x 3, Mood & affect appropriate  HEENT:   Normal oral mucosa, PERRL, EOMI	  Lymphatic: No lymphadenopathy  Cardiovascular: Normal S1 S2,RRR, No JVD, No murmurs  Respiratory: Lungs clear to auscultation	  Gastrointestinal:  Soft, Non-tender, + BS	  Skin: No rashes, No ecchymoses, No cyanosis	  Neurologic: Non-focal  Extremities: Normal range of motion, No clubbing, cyanosis or edema  Vascular: Peripheral pulses palpable 2+ bilaterally      ECG:  	< from: 12 Lead ECG (20 @ 16:43) >  Diagnosis Line Atrial fibrillation with rapid ventricular response  Marked ST abnormality, possible lateral subendocardial injury  Abnormal ECG    Confirmed by Alex Bar ([id number]) on 2020 4:41:04 AM    < end of copied text >      	  LABS:	 	    CARDIAC MARKERS:                                    12.3   13.45 )-----------( 205      ( 08 Aug 2020 04:11 )             35.9     08-08    136  |  92<L>  |  30<H>  ----------------------------<  154<H>  3.1<L>   |  30  |  1.4    Ca    9.2      08 Aug 2020 04:11    TPro  6.2  /  Alb  3.9  /  TBili  0.5  /  DBili  x   /  AST  98<H>  /  ALT  83<H>  /  AlkPhos  66  08-08    PT/INR - ( 08 Aug 2020 04:11 )   PT: 13.70 sec;   INR: 1.19 ratio         PTT - ( 08 Aug 2020 04:11 )  PTT:27.0 sec
MALIHA LOZA  74y, Female  Allergy: penicillin (Hives)  tizanidine (Hives)      All historical available data reviewed.    HPI:  74 year old female patient presented from the urgent clinic for management of atrial fibrillation with RVR.     Known CAD sp CABG, hypertension, dyslipidemic, chronic back pain.     Current history goes back to 4 days ptp when the patient began experiencing dysuria, frequency and urgency. Patient seeked medical attention 2 days PTP for the dysuria and was discharged on an unknown oral antibiotic.   1 day ptp, patient develops diarrhea, non-bloody, non-mucoid, large volume x5 episodes associated with non-billous non-bloody x10 episodes of vomiting. Patient returns to urgent care for assessment. During her visit, patient experienced acute onset palpitations associated with pressure like chest discomfort localized to the retrosternal region and non-radiating. No diaphoresis, no dizziness, no syncope or presyncope. An ECG was done in the urgent care which showed afib with RVR, patient hypotensive during the episode, transfered by EMS to [facility] for management. (07 Aug 2020 22:23)  ID called for clearence    FAMILY HISTORY:  No pertinent family history in first degree relatives    PAST MEDICAL & SURGICAL HISTORY:  Heart attack  HTN (hypertension)  CAD (coronary artery disease)  Chronic midline back pain, unspecified back location  S/P CABG (coronary artery bypass graft)  History of appendectomy   delivery delivered  H/O appendicitis  History of right wrist replacement        VITALS:  T(F): 97.6, Max: 97.6 (20 @ 11:45)  HR: 71  BP: 174/74  RR: 19Vital Signs Last 24 Hrs  T(C): 36.4 (12 Aug 2020 11:45), Max: 36.4 (12 Aug 2020 11:45)  T(F): 97.6 (12 Aug 2020 11:45), Max: 97.6 (12 Aug 2020 11:45)  HR: 71 (12 Aug 2020 11:45) (52 - 79)  BP: 174/74 (12 Aug 2020 11:45) (130/62 - 179/80)  BP(mean): 107 (12 Aug 2020 11:45) (107 - 107)  RR: 19 (12 Aug 2020 07:45) (18 - 19)  SpO2: 97% (12 Aug 2020 07:45) (97% - 97%)    TESTS & MEASUREMENTS:                        13.2   10.47 )-----------( 311      ( 12 Aug 2020 07:05 )             39.2     08-12    138  |  96<L>  |  22<H>  ----------------------------<  158<H>  3.9   |  28  |  1.3    Ca    9.7      12 Aug 2020 07:05  Phos  3.3     08-11  Mg     1.7     08-12          Culture - Urine (collected 20 @ 17:30)  Source: .Urine Clean Catch (Midstream)  Final Report (20 @ 23:07):    >=3 organisms. Probable collection contamination.    Culture - Blood (collected 20 @ 16:16)  Source: .Blood Blood-Peripheral  Preliminary Report (20 @ 23:01):    No growth to date.    Culture - Blood (collected 20 @ 16:16)  Source: .Blood Blood-Peripheral  Preliminary Report (20 @ 23:01):    No growth to date.    Culture - Urine (collected 20 @ 12:04)  Source: .Urine Clean Catch (Midstream)  Final Report (20 @ 13:07):    <10,000 CFU/mL Normal Urogenital Pretty            RADIOLOGY & ADDITIONAL TESTS:  Personal review of radiological diagnostics performed  Echo and EKG results noted when applicable.     MEDICATIONS:  aDENosine Injectable (ADENOSCAN) 60 milliGRAM(s) IV Bolus once  chlorhexidine 4% Liquid 1 Application(s) Topical daily  clopidogrel Tablet 75 milliGRAM(s) Oral daily  gabapentin 600 milliGRAM(s) Oral daily  lidocaine 2% Gel 1 Application(s) Topical once  NIFEdipine XL 60 milliGRAM(s) Oral daily  pantoprazole    Tablet 40 milliGRAM(s) Oral before breakfast  simvastatin 40 milliGRAM(s) Oral at bedtime  traMADol 50 milliGRAM(s) Oral daily PRN      ANTIBIOTICS:
Patient is a 74y old  Female who presents with a chief complaint of Palpitations (07 Aug 2020 22:23)      HPI:  74 year old female patient presented from the urgent clinic for management of atrial fibrillation with RVR.     Known CAD sp CABG, hypertension, dyslipidemic, chronic back pain.     Current history goes back to 4 days ptp when the patient began experiencing dysuria, frequency and urgency. Patient seeked medical attention 2 days PTP for the dysuria and was discharged on an unknown oral antibiotic.   1 day ptp, patient develops diarrhea, non-bloody, non-mucoid, large volume x5 episodes associated with non-billous non-bloody x10 episodes of vomiting. Patient returns to urgent care for assessment. During her visit, patient experienced acute onset palpitations associated with pressure like chest discomfort localized to the retrosternal region and non-radiating. No diaphoresis, no dizziness, no syncope or presyncope. An ECG was done in the urgent care which showed afib with RVR, patient hypotensive during the episode, transfered by EMS to [facility] for management. (07 Aug 2020 22:23)      PAST MEDICAL & SURGICAL HISTORY:  Heart attack  HTN (hypertension)  CAD (coronary artery disease)  Chronic midline back pain, unspecified back location  S/P CABG (coronary artery bypass graft)  History of appendectomy   delivery delivered  H/O appendicitis  History of right wrist replacement      SOCIAL HX:   Smoking  denied                       ETOH     unkown                       Other    FAMILY HISTORY:  No pertinent family history in first degree relatives  :  No known cardiovacular family hisotry     ROS:  See HPI     Allergies    penicillin (Hives)  tizanidine (Hives)    Intolerances          PHYSICAL EXAM    ICU Vital Signs Last 24 Hrs  T(C): 36.6 (08 Aug 2020 06:30), Max: 39.3 (08 Aug 2020 04:00)  T(F): 97.8 (08 Aug 2020 06:30), Max: 102.7 (08 Aug 2020 04:00)  HR: 62 (08 Aug 2020 07:00) (54 - 129)  BP: 114/47 (08 Aug 2020 07:00) (71/43 - 167/54)  BP(mean): 66 (08 Aug 2020 07:00) (53 - 120)  RR: 21 (08 Aug 2020 07:00) (0 - 38)  SpO2: 98% (08 Aug 2020 07:00) (94% - 100%)      General: In NAD   HEENT:  BARI              Lungs: Bilateral BS  Cardiovascular: Regular  Gastrointestinal: Soft, Positive BS  Musculoskeletal: No clubbing.  Moves all extremities.  Full range of motion   Skin: Warm.  Intact  Neurological: No motor or sensory deficit       20 @ 07:01  -  20 @ 07:00  --------------------------------------------------------  IN:    IV PiggyBack: 100 mL    Lactated Ringers IV Bolus: 1850 mL    Oral Fluid: 360 mL  Total IN: 2310 mL    OUT:    Voided: 250 mL  Total OUT: 250 mL    Total NET: 2060 mL          LABS:                          12.3   13.45 )-----------( 205      ( 08 Aug 2020 04:11 )             35.9                                               08-08    136  |  92<L>  |  30<H>  ----------------------------<  154<H>  3.1<L>   |  30  |  1.4    Ca    9.2      08 Aug 2020 04:11    TPro  6.2  /  Alb  3.9  /  TBili  0.5  /  DBili  x   /  AST  98<H>  /  ALT  83<H>  /  AlkPhos  66  08-08      PT/INR - ( 08 Aug 2020 04:11 )   PT: 13.70 sec;   INR: 1.19 ratio         PTT - ( 08 Aug 2020 04:11 )  PTT:27.0 sec                                       Urinalysis Basic - ( 07 Aug 2020 17:30 )    Color: Light Yellow / Appearance: Clear / S.007 / pH: x  Gluc: x / Ketone: Negative  / Bili: Negative / Urobili: <2 mg/dL   Blood: x / Protein: Negative / Nitrite: Negative   Leuk Esterase: Negative / RBC: x / WBC x   Sq Epi: x / Non Sq Epi: x / Bacteria: x        CARDIAC MARKERS ( 08 Aug 2020 04:11 )  x     / 0.15 ng/mL / x     / x     / x      CARDIAC MARKERS ( 07 Aug 2020 17:55 )  x     / 0.03 ng/mL / x     / x     / x                                                LIVER FUNCTIONS - ( 08 Aug 2020 04:11 )  Alb: 3.9 g/dL / Pro: 6.2 g/dL / ALK PHOS: 66 U/L / ALT: 83 U/L / AST: 98 U/L / GGT: x                                                                                                                                       X-Rays   B/l infiltrates                                                                                  ECHO    MEDICATIONS  (STANDING):  ATENolol  Tablet 50 milliGRAM(s) Oral daily  cefTRIAXone   IVPB 1000 milliGRAM(s) IV Intermittent every 24 hours  chlorhexidine 4% Liquid 1 Application(s) Topical daily  clopidogrel Tablet 75 milliGRAM(s) Oral daily  diltiazem    milliGRAM(s) Oral daily  enoxaparin Injectable 60 milliGRAM(s) SubCutaneous every 12 hours  gabapentin 600 milliGRAM(s) Oral daily  pantoprazole  Injectable 40 milliGRAM(s) IV Push daily  simvastatin 40 milliGRAM(s) Oral at bedtime    MEDICATIONS  (PRN):  traMADol 50 milliGRAM(s) Oral daily PRN Severe Pain (7 - 10)
Patient is a 74y old  Female who presents with a chief complaint of Palpitations (08 Aug 2020 09:38)    HPI: Pt is a 75 yo F with hx of CAD sp quadruple CABG, HTN, HLD and chronic back pain who was admitted for new onset AF RVR. Pts hx goes back 5 days ago with c/o urinary frequency and burning for which she sought medical attention at urgent care and was started on Macrobid. After 2 doses pt developed N/V/D multiple times so she returned to urgent care. While in the waiting room pt developed palpitations with chest tightness and SOB. EKG was done and pt found to tamanna n AF RVR as well as hypotensive so she was sent to ED. In ED pts BP 80s/40s with HR in 120s. Also found to be in septic shock with leukocytosis and elevated lactate ~7, admitted to ICU. Pt on Cardizem at home, started on Lovenox and spontaneously converted back to NSR. Pt now more comfortable with stable VS, still with some dysuria and hesitancy. Lactate and leukocytosis improving. EP consulted for new onset AF RVR management. Pt follows with Dr Marte cardiologist, no recent cath and last stress test ?.    PAST MEDICAL & SURGICAL HISTORY:  Heart attack  HTN (hypertension)  CAD (coronary artery disease)  Chronic midline back pain, unspecified back location  S/P CABG (coronary artery bypass graft)  History of appendectomy   delivery delivered  H/O appendicitis  History of right wrist replacement      PREVIOUS DIAGNOSTIC TESTING:      ECHO  FINDINGS:    STRESS  FINDINGS:    CATHETERIZATION  FINDINGS:    ELECTROPHYSIOLOGY STUDY  FINDINGS:    CAROTID ULTRASOUND:  FINDINGS    VENOUS DUPLEX SCAN:  FINDINGS:    CHEST CT PULMONARY ANGIO with IV Contrast:  FINDINGS:    MEDICATIONS  (STANDING):  ATENolol  Tablet 50 milliGRAM(s) Oral daily  cefTRIAXone   IVPB 1000 milliGRAM(s) IV Intermittent every 24 hours  chlorhexidine 4% Liquid 1 Application(s) Topical daily  clopidogrel Tablet 75 milliGRAM(s) Oral daily  diltiazem    milliGRAM(s) Oral daily  enoxaparin Injectable 60 milliGRAM(s) SubCutaneous every 12 hours  gabapentin 600 milliGRAM(s) Oral daily  pantoprazole  Injectable 40 milliGRAM(s) IV Push daily  potassium chloride    Tablet ER 40 milliEquivalent(s) Oral every 4 hours  simvastatin 40 milliGRAM(s) Oral at bedtime    MEDICATIONS  (PRN):  traMADol 50 milliGRAM(s) Oral daily PRN Severe Pain (7 - 10)      FAMILY HISTORY:  No pertinent family history in first degree relatives      SOCIAL HISTORY: No smoking, ETOH or illicit drug use    Past Surgical History:  CABG x4  Appendectomy      Allergies:  penicillin (Hives)  tizanidine (Hives)      REVIEW OF SYSTEMS:  CONSTITUTIONAL: No fever, weight loss, chills, shakes, or fatigue  EYES: No eye pain, visual disturbances, or discharge  ENMT:  No difficulty hearing, tinnitus, vertigo; No sinus or throat pain  NECK: No pain or stiffness  BREASTS: No pain, masses, or nipple discharge  RESPIRATORY: No cough, wheezing, hemoptysis; +shortness of breath  CARDIOVASCULAR: +Chest tightness, palpitations. No dizziness, syncope, paroxysmal nocturnal dyspnea, orthopnea, or arm or leg swelling  GASTROINTESTINAL: +N/V/D, resolved.  GENITOURINARY: +Dysuria, hesitancy  NEUROLOGICAL: No headaches, memory loss, slurred speech, limb weakness, loss of strength, numbness, or tremors  MUSCULOSKELETAL: No joint pain or swelling, muscle, back, or extremity pain      Vital Signs Last 24 Hrs  T(C): 36.4 (08 Aug 2020 08:00), Max: 39.3 (08 Aug 2020 04:00)  T(F): 97.6 (08 Aug 2020 08:00), Max: 102.7 (08 Aug 2020 04:00)  HR: 60 (08 Aug 2020 10:00) (54 - 129)  BP: 125/58 (08 Aug 2020 08:00) (71/43 - 167/54)  BP(mean): 94 (08 Aug 2020 08:00) (53 - 120)  RR: 46 (08 Aug 2020 10:00) (0 - 46)  SpO2: 90% (08 Aug 2020 10:00) (90% - 100%)    PHYSICAL EXAM  GENERAL: In no apparent distress, well nourished, and hydrated.  HEAD:  Atraumatic, Normocephalic  EYES: EOMI, PERRLA, conjunctiva and sclera clear  ENMT: No tonsillar erythema, exudates, or enlargements; Moist mucous membranes, Good dentition, No lesions  NECK: Supple and normal thyroid.  No JVD or carotid bruit.  Carotid pulse is 2+ bilaterally.  HEART: Regular rate and rhythm; No murmurs, rubs, or gallops.  PULMONARY: Clear to auscultation and perfusion.  No rales, wheezing, or rhonchi bilaterally.  ABDOMEN: Soft, Nontender, Nondistended; Bowel sounds present  EXTREMITIES:  2+ Peripheral Pulses, No clubbing, cyanosis, or edema  NEUROLOGICAL: Grossly nonfocal      INTERPRETATION OF TELEMETRY: NSR 60 bpm, no ectopy.    ECG:  < from: 12 Lead ECG (20 @ 16:43) >    Ventricular Rate 103 BPM    Atrial Rate 113 BPM    QRS Duration 106 ms    Q-T Interval 384 ms    QTC Calculation(Bezet) 503 ms    R Axis 43 degrees    T Axis 167 degrees    Diagnosis Line Atrial fibrillation with rapid ventricular response  Marked ST abnormality, possible lateral subendocardial injury  Abnormal ECG    Confirmed by Alex Bar ([id number]) on 2020 4:41:04 AM    I&O's Detail    07 Aug 2020 07:01  -  08 Aug 2020 07:00  --------------------------------------------------------  IN:    IV PiggyBack: 100 mL    Lactated Ringers IV Bolus: 1850 mL    Oral Fluid: 360 mL  Total IN: 2310 mL    OUT:    Voided: 250 mL  Total OUT: 250 mL    Total NET: 2060 mL      08 Aug 2020 07:01  -  08 Aug 2020 10:49  --------------------------------------------------------  IN:    Oral Fluid: 120 mL  Total IN: 120 mL    OUT:  Total OUT: 0 mL    Total NET: 120 mL          LABS:                        12.3   13.45 )-----------( 205      ( 08 Aug 2020 04:11 )             35.9     08-08    136  |  92<L>  |  30<H>  ----------------------------<  154<H>  3.1<L>   |  30  |  1.4    Ca    9.2      08 Aug 2020 04:11    TPro  6.2  /  Alb  3.9  /  TBili  0.5  /  DBili  x   /  AST  98<H>  /  ALT  83<H>  /  AlkPhos  66  08-08    CARDIAC MARKERS ( 08 Aug 2020 04:11 )  x     / 0.15 ng/mL / x     / x     / x      CARDIAC MARKERS ( 07 Aug 2020 17:55 )  x     / 0.03 ng/mL / x     / x     / x          PT/INR - ( 08 Aug 2020 04:11 )   PT: 13.70 sec;   INR: 1.19 ratio         PTT - ( 08 Aug 2020 04:11 )  PTT:27.0 sec  Urinalysis Basic - ( 07 Aug 2020 17:30 )    Color: Light Yellow / Appearance: Clear / S.007 / pH: x  Gluc: x / Ketone: Negative  / Bili: Negative / Urobili: <2 mg/dL   Blood: x / Protein: Negative / Nitrite: Negative   Leuk Esterase: Negative / RBC: x / WBC x   Sq Epi: x / Non Sq Epi: x / Bacteria: x      BNP  I&O's Detail    07 Aug 2020 07:01  -  08 Aug 2020 07:00  --------------------------------------------------------  IN:    IV PiggyBack: 100 mL    Lactated Ringers IV Bolus: 1850 mL    Oral Fluid: 360 mL  Total IN: 2310 mL    OUT:    Voided: 250 mL  Total OUT: 250 mL    Total NET: 2060 mL      08 Aug 2020 07:01  -  08 Aug 2020 10:49  --------------------------------------------------------  IN:    Oral Fluid: 120 mL  Total IN: 120 mL    OUT:  Total OUT: 0 mL    Total NET: 120 mL        Daily Height in cm: 157.48 (07 Aug 2020 16:12)    Daily Weight in k.1 (07 Aug 2020 23:00)    RADIOLOGY & ADDITIONAL STUDIES:  < from: Xray Chest 1 View-PORTABLE IMMEDIATE (20 @ 17:04) >    EXAM:  XR CHEST PORTABLE IMMED 1V            PROCEDURE DATE:  2020            INTERPRETATION:  Clinical History / Reason for exam: Sepsis    Comparison : Chest radiograph portable AP chest x-ray dated 2020.    Technique/Positioning: Portable AP chest.    Findings:    Support devices: EKG leads    Cardiac/mediastinum/hilum: Cardiomegaly. Status post sternotomy. No evidence of CHF.    Lung parenchyma/Pleura: Lungs are clear of infiltrate. The costophrenic angles are clear. No pneumothorax.    Skeleton/soft tissues: Stable    Impression:    No radiographic evidence of acute cardiopulmonary disease.    As post CABG. Cardiomegaly.                MILLER WHITESIDE M.D., ATTENDING RADIOLOGIST  This document has been electronically signed.Aug  7 2020  5:36PM        < end of copied text >

## 2020-08-12 NOTE — PROGRESS NOTE ADULT - SUBJECTIVE AND OBJECTIVE BOX
no chest pain   no sob   doing well     Vital Signs Last 24 Hrs  T(C): 36.2 (12 Aug 2020 04:23), Max: 36.2 (12 Aug 2020 04:23)  T(F): 97.2 (12 Aug 2020 04:23), Max: 97.2 (12 Aug 2020 04:23)  HR: 77 (12 Aug 2020 07:45) (52 - 79)  BP: 130/62 (12 Aug 2020 07:02) (130/62 - 179/80)  BP(mean): --  RR: 19 (12 Aug 2020 07:45) (18 - 19)  SpO2: 97% (12 Aug 2020 07:45) (97% - 97%)    PHYSICAL EXAM:  GENERAL: NAD,   Pulm: Clear to auscultation bilaterally; No wheeze  CV: Regular rate and rhythm; No murmurs, rubs, or gallops  GI: Soft, Nontender, Nondistended; Bowel sounds present  EXTREMITIES:  2+ Peripheral Pulses, No clubbing, cyanosis, or edema  PSYCH: AAOx3  NEUROLOGY: non-focal  SKIN: No rashes or lesions                          13.2   10.47 )-----------( 311      ( 12 Aug 2020 07:05 )             39.2     08-12    138  |  96<L>  |  22<H>  ----------------------------<  158<H>  3.9   |  28  |  1.3    Ca    9.7      12 Aug 2020 07:05  Phos  3.3     08-11  Mg     1.7     08-12              MEDICATIONS  (STANDING):  aDENosine Injectable (ADENOSCAN) 60 milliGRAM(s) IV Bolus once  cefTRIAXone   IVPB 1000 milliGRAM(s) IV Intermittent every 24 hours  chlorhexidine 4% Liquid 1 Application(s) Topical daily  clopidogrel Tablet 75 milliGRAM(s) Oral daily  gabapentin 600 milliGRAM(s) Oral daily  lidocaine 2% Gel 1 Application(s) Topical once  NIFEdipine XL 60 milliGRAM(s) Oral daily  pantoprazole    Tablet 40 milliGRAM(s) Oral before breakfast  simvastatin 40 milliGRAM(s) Oral at bedtime    MEDICATIONS  (PRN):  traMADol 50 milliGRAM(s) Oral daily PRN Severe Pain (7 - 10)

## 2020-08-12 NOTE — PROGRESS NOTE ADULT - ASSESSMENT
No complaints. Adenocard thallium post ischemia. Patient now claims recent brief chest pain. Patient to have cardiac cath friday. NPO after midnight. Will need Eliquis or Xarelto after cath. Recheck bp

## 2020-08-13 LAB
ANION GAP SERPL CALC-SCNC: 12 MMOL/L — SIGNIFICANT CHANGE UP (ref 7–14)
BUN SERPL-MCNC: 16 MG/DL — SIGNIFICANT CHANGE UP (ref 10–20)
CALCIUM SERPL-MCNC: 9.6 MG/DL — SIGNIFICANT CHANGE UP (ref 8.5–10.1)
CHLORIDE SERPL-SCNC: 99 MMOL/L — SIGNIFICANT CHANGE UP (ref 98–110)
CO2 SERPL-SCNC: 29 MMOL/L — SIGNIFICANT CHANGE UP (ref 17–32)
CREAT SERPL-MCNC: 1 MG/DL — SIGNIFICANT CHANGE UP (ref 0.7–1.5)
GLUCOSE SERPL-MCNC: 173 MG/DL — HIGH (ref 70–99)
HCT VFR BLD CALC: 38.6 % — SIGNIFICANT CHANGE UP (ref 37–47)
HGB BLD-MCNC: 12.7 G/DL — SIGNIFICANT CHANGE UP (ref 12–16)
MAGNESIUM SERPL-MCNC: 2.2 MG/DL — SIGNIFICANT CHANGE UP (ref 1.8–2.4)
MCHC RBC-ENTMCNC: 29.7 PG — SIGNIFICANT CHANGE UP (ref 27–31)
MCHC RBC-ENTMCNC: 32.9 G/DL — SIGNIFICANT CHANGE UP (ref 32–37)
MCV RBC AUTO: 90.2 FL — SIGNIFICANT CHANGE UP (ref 81–99)
NRBC # BLD: 0 /100 WBCS — SIGNIFICANT CHANGE UP (ref 0–0)
PLATELET # BLD AUTO: 290 K/UL — SIGNIFICANT CHANGE UP (ref 130–400)
POTASSIUM SERPL-MCNC: 4 MMOL/L — SIGNIFICANT CHANGE UP (ref 3.5–5)
POTASSIUM SERPL-SCNC: 4 MMOL/L — SIGNIFICANT CHANGE UP (ref 3.5–5)
RBC # BLD: 4.28 M/UL — SIGNIFICANT CHANGE UP (ref 4.2–5.4)
RBC # FLD: 13.1 % — SIGNIFICANT CHANGE UP (ref 11.5–14.5)
SODIUM SERPL-SCNC: 140 MMOL/L — SIGNIFICANT CHANGE UP (ref 135–146)
WBC # BLD: 10.06 K/UL — SIGNIFICANT CHANGE UP (ref 4.8–10.8)
WBC # FLD AUTO: 10.06 K/UL — SIGNIFICANT CHANGE UP (ref 4.8–10.8)

## 2020-08-13 PROCEDURE — 93459 L HRT ART/GRFT ANGIO: CPT | Mod: 26,59

## 2020-08-13 PROCEDURE — 92928 PRQ TCAT PLMT NTRAC ST 1 LES: CPT | Mod: RC

## 2020-08-13 PROCEDURE — 93010 ELECTROCARDIOGRAM REPORT: CPT

## 2020-08-13 PROCEDURE — 99233 SBSQ HOSP IP/OBS HIGH 50: CPT

## 2020-08-13 RX ORDER — ASPIRIN/CALCIUM CARB/MAGNESIUM 324 MG
325 TABLET ORAL ONCE
Refills: 0 | Status: DISCONTINUED | OUTPATIENT
Start: 2020-08-13 | End: 2020-08-14

## 2020-08-13 RX ORDER — MAGNESIUM SULFATE 500 MG/ML
2 VIAL (ML) INJECTION ONCE
Refills: 0 | Status: COMPLETED | OUTPATIENT
Start: 2020-08-13 | End: 2020-08-13

## 2020-08-13 RX ORDER — APIXABAN 2.5 MG/1
5 TABLET, FILM COATED ORAL EVERY 12 HOURS
Refills: 0 | Status: DISCONTINUED | OUTPATIENT
Start: 2020-08-14 | End: 2020-08-14

## 2020-08-13 RX ORDER — SODIUM CHLORIDE 9 MG/ML
1000 INJECTION INTRAMUSCULAR; INTRAVENOUS; SUBCUTANEOUS
Refills: 0 | Status: DISCONTINUED | OUTPATIENT
Start: 2020-08-13 | End: 2020-08-14

## 2020-08-13 RX ORDER — ASPIRIN/CALCIUM CARB/MAGNESIUM 324 MG
81 TABLET ORAL DAILY
Refills: 0 | Status: DISCONTINUED | OUTPATIENT
Start: 2020-08-14 | End: 2020-08-14

## 2020-08-13 RX ORDER — LANOLIN ALCOHOL/MO/W.PET/CERES
5 CREAM (GRAM) TOPICAL AT BEDTIME
Refills: 0 | Status: DISCONTINUED | OUTPATIENT
Start: 2020-08-13 | End: 2020-08-14

## 2020-08-13 RX ORDER — SENNA PLUS 8.6 MG/1
2 TABLET ORAL AT BEDTIME
Refills: 0 | Status: DISCONTINUED | OUTPATIENT
Start: 2020-08-13 | End: 2020-08-14

## 2020-08-13 RX ORDER — SODIUM CHLORIDE 9 MG/ML
200 INJECTION INTRAMUSCULAR; INTRAVENOUS; SUBCUTANEOUS ONCE
Refills: 0 | Status: COMPLETED | OUTPATIENT
Start: 2020-08-13 | End: 2020-08-13

## 2020-08-13 RX ADMIN — PANTOPRAZOLE SODIUM 40 MILLIGRAM(S): 20 TABLET, DELAYED RELEASE ORAL at 05:35

## 2020-08-13 RX ADMIN — GABAPENTIN 600 MILLIGRAM(S): 400 CAPSULE ORAL at 10:27

## 2020-08-13 RX ADMIN — SODIUM CHLORIDE 100 MILLILITER(S): 9 INJECTION INTRAMUSCULAR; INTRAVENOUS; SUBCUTANEOUS at 17:04

## 2020-08-13 RX ADMIN — SIMVASTATIN 40 MILLIGRAM(S): 20 TABLET, FILM COATED ORAL at 21:59

## 2020-08-13 RX ADMIN — TRAMADOL HYDROCHLORIDE 50 MILLIGRAM(S): 50 TABLET ORAL at 08:15

## 2020-08-13 RX ADMIN — CLOPIDOGREL BISULFATE 75 MILLIGRAM(S): 75 TABLET, FILM COATED ORAL at 10:28

## 2020-08-13 RX ADMIN — SODIUM CHLORIDE 200 MILLILITER(S): 9 INJECTION INTRAMUSCULAR; INTRAVENOUS; SUBCUTANEOUS at 07:47

## 2020-08-13 RX ADMIN — Medication 50 GRAM(S): at 09:58

## 2020-08-13 RX ADMIN — SENNA PLUS 2 TABLET(S): 8.6 TABLET ORAL at 21:59

## 2020-08-13 RX ADMIN — TRAMADOL HYDROCHLORIDE 50 MILLIGRAM(S): 50 TABLET ORAL at 09:15

## 2020-08-13 RX ADMIN — Medication 60 MILLIGRAM(S): at 05:35

## 2020-08-13 RX ADMIN — Medication 5 MILLIGRAM(S): at 21:59

## 2020-08-13 RX ADMIN — Medication 5 MILLIGRAM(S): at 02:24

## 2020-08-13 NOTE — CHART NOTE - NSCHARTNOTEFT_GEN_A_CORE
PRE-OP DIAGNOSIS: NSTEMI    PROCEDURE: [facility] with coronary angiography    Physician: Dr. Daniels  Assistant: Tina    ANESTHESIA TYPE:  [  ] General Anesthesia  [x] Sedation  [x] Local/Regional    ESTIMATED BLOOD LOSS:     10 mL    CONDITION  [  ] Critical  [  ] Serious  [x] Fair  [  ] Good    SPECIMENS REMOVED (IF APPLICABLE): N/A      IV CONTRAST:    Visipaque 200 mL      IMPLANTS (IF APPLICABLE)      FINDINGS    LEFT HEART CATHETERIZATION                                    Left Main: normal    LAD: prox LAD mild disease, mid LAD 80% tubular stenosis, distal LAD competitive flow seen from graft                  Diag: D1 severe ostial-prox disease, D1 severe ostial-prox disease    Left Circumflex: prox circ mild disease, distal circ moderate disease  OM: OM1 luminal irregularities, OM2 very small artery vessel moderate diffuse disease  LPDA: very small artery vessel moderate diffuse disease    Right Coronary Artery: pro RCA luminal irregularities, mid RCA severe diffuse disease, distal RCA 95% tubular lesion  RPDA: mild disease  RPL: mild disease    Grafts:  LIMA - atretic  SVG to distal LAD - no disease, sequenced segment to marginal vessel occluded  no other SVG grafts seen    DOMINANCE: Co-dominant    ACCESS: Right femoral artery 6F  CLOSURE: Angioseal 6F    INTERVENTION: s/p PCI+stentsx1 to distal RCA, s/p PCI+stentsx3 to mid RCA  IMPLANTS:   Synergy 2.5x12 mm to distal RCA  Synergy 2.5x12mm, 2.5x12mm, [id number] to mid RCA      POST-OP DIAGNOSIS  Significant 3V disease, atretic LIMA, patent SVG to distal LAD s/p PCI+stentsx1 to distal RCA, s/p PCI+stentsx3 to mid RCA      PLAN OF CARE  [x] Resume eliquis from tomorrow AM if no further procedure planned  [x] Continue DAPT & Statin therapy PRE-OP DIAGNOSIS: NSTEMI    PROCEDURE: [facility] with coronary angiography PCI/stenting of RCA    Physician: Dr. Jeremiah ARROYO [facility]  Assistant: Tina    ANESTHESIA TYPE:  [  ] General Anesthesia  [x] Sedation  [x] Local/Regional    ESTIMATED BLOOD LOSS:     10 mL    CONDITION  [  ] Critical  [  ] Serious  [x] Fair  [  ] Good    SPECIMENS REMOVED (IF APPLICABLE): N/A      IV CONTRAST:    Visipaque 200 mL      IMPLANTS (IF APPLICABLE)  See below.    FINDINGS    LEFT HEART CATHETERIZATION                                    Left Main: normal    LAD: prox LAD mild disease, mid LAD 80% tubular stenosis, distal LAD competitive flow seen from graft                  Diag: D1 severe ostial-prox disease, D1 severe ostial-prox disease    Left Circumflex: prox circ mild disease, distal circ moderate disease  OM: OM1 luminal irregularities, OM2 very small artery vessel moderate diffuse disease  LPDA: very small artery vessel moderate diffuse disease    Right Coronary Artery: proximal RCA luminal irregularities, mid RCA severe diffuse disease 95% long segment, distal RCA 95% tubular lesion  RPDA: mild disease  RPL: mild disease    Grafts:  LIMA - atretic  SVG to distal LAD - no disease, sequenced segment to marginal vessel occluded  no other SVG grafts seen    DOMINANCE: Co-dominant    ACCESS: Right femoral artery 6F  CLOSURE: Angioseal 6F    INTERVENTION: s/p PCI+stentsx1 to distal RCA, s/p PCI+stentsx3 to mid RCA  IMPLANTS:   Synergy 2.5x12 mm to distal RCA  Synergy 2.5x12mm, 2.5x12mm, [id number] to mid RCA  RCA was dilated to patent with DEEDEE grade III flow.    POST-OP DIAGNOSIS  Significant 3V disease, atretic LIMA, patent SVG to distal LAD s/p PCI+stentsx1 to distal RCA, s/p PCI+stentsx3 to mid RCA      PLAN OF CARE  [x] Resume eliquis from tomorrow AM if no further procedure planned  [x] Continue DAPT & Statin therapy  Case discussed with the patient. Dr. Ni the referring Cardiologist was notified of the results of the procedure and will continue to follow the patient.

## 2020-08-13 NOTE — PROGRESS NOTE ADULT - ASSESSMENT
74 year old female with known CAD s/p CABG, hypertension, dyslipidemic and chronic back pain presented from the urgent clinic for management of atrial fibrillation with RVR.       # Afib with RVR -sinus nadira much improved atenolol and cardizem stopped   d/c atenolol and monitor off cardizem if HR goes up will resume at lower dose   on eliquis but on hold for cath Discussed benefits of starting anticoagulation to prevent strokes from Afiband risks involved in starting anticoagulantion including risk of bleeding, hemorrhagic stroke, GI bleed and even death. Pt agreed to start anticoagulation given benifits outweighs risk.  echo noted     #NSTEMI likely demand ischemia type 2: history of CABG on plavix. nuc stress test positive for ischemia.  for cath today     #hypomagnesemia replete     #presumed UTI on rocephin urine culture neg   today is day 6 of rocephin will stop abx.  cardiology requested ID clearance for cath. I do not see any contraindication to proceed with cath. will get ID as per cardiology request     #DANGELO on CKD 3 improved     # Dyslipidemia  - Continue simvastatin     # Hypertension now controlled on procardia XL 60 po qday       #Progress Note Handoff  Pending (specify):  cath    Family discussion: patient   Disposition: home

## 2020-08-13 NOTE — PROGRESS NOTE ADULT - SUBJECTIVE AND OBJECTIVE BOX
no chest pain   no sob   doing well     Vital Signs Last 24 Hrs  T(C): 36.6 (13 Aug 2020 05:17), Max: 36.6 (13 Aug 2020 05:17)  T(F): 97.9 (13 Aug 2020 05:17), Max: 97.9 (13 Aug 2020 05:17)  HR: 80 (13 Aug 2020 05:17) (65 - 82)  BP: 124/60 (13 Aug 2020 05:17) (124/60 - 174/74)  BP(mean): 107 (12 Aug 2020 11:45) (107 - 107)  RR: 16 (13 Aug 2020 05:17) (16 - 18)  SpO2: --    PHYSICAL EXAM:  GENERAL: NAD, well-developed  HEAD:  Atraumatic, Normocephalic  EYES: EOMI, PERRLA, conjunctiva and sclera clear  NECK: Supple, No JVD  Pulm: Clear to auscultation bilaterally; No wheeze  CV: Regular rate and rhythm; No murmurs, rubs, or gallops  GI: Soft, Nontender, Nondistended; Bowel sounds present  EXTREMITIES:  2+ Peripheral Pulses, No clubbing, cyanosis, or edema  PSYCH: AAOx3  NEUROLOGY: non-focal  SKIN: No rashes or lesions                          13.2   10.47 )-----------( 311      ( 12 Aug 2020 07:05 )             39.2     08-12    138  |  96<L>  |  22<H>  ----------------------------<  158<H>  3.9   |  28  |  1.3    Ca    9.7      12 Aug 2020 07:05  Mg     1.7     08-12              MEDICATIONS  (STANDING):  aDENosine Injectable (ADENOSCAN) 60 milliGRAM(s) IV Bolus once  chlorhexidine 4% Liquid 1 Application(s) Topical daily  clopidogrel Tablet 75 milliGRAM(s) Oral daily  gabapentin 600 milliGRAM(s) Oral daily  lidocaine 2% Gel 1 Application(s) Topical once  magnesium sulfate  IVPB 2 Gram(s) IV Intermittent once  melatonin 5 milliGRAM(s) Oral at bedtime  NIFEdipine XL 60 milliGRAM(s) Oral daily  pantoprazole    Tablet 40 milliGRAM(s) Oral before breakfast  simvastatin 40 milliGRAM(s) Oral at bedtime    MEDICATIONS  (PRN):  traMADol 50 milliGRAM(s) Oral daily PRN Severe Pain (7 - 10)

## 2020-08-13 NOTE — PROGRESS NOTE ADULT - ASSESSMENT
74 year old female with known CAD s/p CABG, hypertension, dyslipidemic and chronic back pain presented from the urgent clinic for management of atrial fibrillation with RVR.     # Afib with RVR - currently in NSR  - Episode of chest discomfort while in afib with RVR and mildly elevated troponin  - holding atenolol and diltiazem given bradycardia, will monitor HR and if uncontrolled tomorrow will start cardizem at lower dose   - TSH 1.48 normal   - will     #Hx CAD s/p CABG  #troponemia likely secondary to demand ischemia   - on home plavix, simvastatin   - holding home atenolol and cardizem due to bradycardia  - troponins downtrended   - stress test NM in 2015 unremarkable  - Stress test 8/10 was positive: moderate to large sized reversible defect involving inferior, inferolateral, and lateral left ventricular walls consistent with ischemia   - cath will be done tomorrow, ID cleared patient for cath    #Magnesium deficiency - resolved  #Hypokalemia - resolved  - K and Mg normal   - will monitor BMP     # DANGELO likely pre-renal secondary to dehydration due to vomiting/diarrhea vs. afib with RVR   - Baseline creatinine normal; Cr downtrended today   - Trending BMP  - Holding home candesartan and chlorthalidone    # presumed UTI  - Please follow urine culture  - UA may be -ve secondary to taking antibiotics  - continue with ceftriaxone for now; will discharge on vantin to complete for 7 total days   - ID consult: no abx needed, clear for catheterization     # Hyperlipidemia   - Continue simvastatin   - lipid panel was unremarkable     # new Type 2 DM   - A1C 6.7 with morning BMP glucose above 126  - will continue to monitor glucose via BMP; if persistently > 180 will start insulin   - f/u with PCP with starting metformin/oral antidiabetics     # Hypertensive  - Holding ARB, Chlorthalidone, atenolol as above  - nifedipine 60mg daily added today   - monitor BP tomorrow     # Chronic back pain   - Keep Gabapentin, tramadol PRN    #Diarrhea resolved  - reglan prn     # Diet > DASH/TLC  # DVT > Lovenox 60mg bid  Code status: full code   GI ppx: not needed 74 year old female with known CAD s/p CABG, hypertension, dyslipidemic and chronic back pain presented from the urgent clinic for management of atrial fibrillation with RVR.     # Afib with RVR - currently in NSR  - Episode of chest discomfort while in afib with RVR and mildly elevated troponin  - holding atenolol and diltiazem given bradycardia, will monitor HR and if uncontrolled tomorrow will start cardizem at lower dose   - TSH 1.48 normal   - will start eliquis in am     #Hx CAD s/p CABG, now s/p PCI   #troponemia likely secondary to demand ischemia   - troponins positive   - stress test NM in 2015 unremarkable  - Stress test 8/10 was positive: moderate to large sized reversible defect involving inferior, inferolateral, and lateral left ventricular walls consistent with ischemia   - cath 8/13: Significant 3V disease, atretic LIMA, patent SVG to distal LAD s/p PCI+stentsx1 to distal RCA, s/p PCI+stentsx3 to mid RCA    #Magnesium deficiency - resolved  #Hypokalemia - resolved  - K and Mg normal   - will monitor BMP     # DANGELO likely pre-renal secondary to dehydration due to vomiting/diarrhea vs. afib with RVR   - Baseline creatinine normal; Cr downtrended today   - Trending BMP  - Holding home candesartan and chlorthalidone    # presumed UTI now asymptomatic   - ID consult: no abx needed  - ucx and bcx negative     # Hyperlipidemia   - Continue simvastatin   - lipid panel was unremarkable     # new Type 2 DM   - A1C 6.7 with morning BMP glucose above 126  - will continue to monitor glucose via BMP; if persistently > 180 will start insulin   - f/u with PCP with starting metformin/oral antidiabetics     # Hypertensive  - Holding ARB, Chlorthalidone, atenolol as above  - nifedipine 60mg daily added today   - monitor BP tomorrow     # Chronic back pain   - Keep Gabapentin, tramadol PRN    #Diarrhea resolved  - reglan prn     # Diet > DASH/TLC  # DVT > Lovenox 60mg bid  Code status: full code   GI ppx: not needed

## 2020-08-13 NOTE — PROGRESS NOTE ADULT - SUBJECTIVE AND OBJECTIVE BOX
SUBJECTIVE:    Patient is a 74y old Female who presents with a chief complaint of Palpitations (13 Aug 2020 11:35)    Currently admitted to medicine with the primary diagnosis of Septic shock     Today is hospital day 6d.     Overnight no events.     This morning patient had no complaints. Denied chest pain, SOB, or fevers. Was resting comfortably in bed.     PAST MEDICAL & SURGICAL HISTORY  Heart attack  HTN (hypertension)  CAD (coronary artery disease)  Chronic midline back pain, unspecified back location  S/P CABG (coronary artery bypass graft)  History of appendectomy   delivery delivered  H/O appendicitis  History of right wrist replacement      ALLERGIES:  penicillin (Hives)  tizanidine (Hives)    MEDICATIONS:  STANDING MEDICATIONS  aDENosine Injectable (ADENOSCAN) 60 milliGRAM(s) IV Bolus once  chlorhexidine 4% Liquid 1 Application(s) Topical daily  clopidogrel Tablet 75 milliGRAM(s) Oral daily  gabapentin 600 milliGRAM(s) Oral daily  lidocaine 2% Gel 1 Application(s) Topical once  melatonin 5 milliGRAM(s) Oral at bedtime  NIFEdipine XL 60 milliGRAM(s) Oral daily  pantoprazole    Tablet 40 milliGRAM(s) Oral before breakfast  senna 2 Tablet(s) Oral at bedtime  simvastatin 40 milliGRAM(s) Oral at bedtime  sodium chloride 0.9%. 1000 milliLiter(s) IV Continuous <Continuous>    PRN MEDICATIONS  traMADol 50 milliGRAM(s) Oral daily PRN    VITALS:   T(F): 97.9  HR: 80  BP: 124/60  RR: 16  SpO2: --    LABS:                        13.2   10.47 )-----------( 311      ( 12 Aug 2020 07:05 )             39.2     08-12    138  |  96<L>  |  22<H>  ----------------------------<  158<H>  3.9   |  28  |  1.3    Ca    9.7      12 Aug 2020 07:05  Mg     1.7     08-12          PHYSICAL EXAM:  GEN: No acute distress  PULM/CHEST: Clear to auscultation bilaterally, no rales, rhonchi or wheezes   CVS: Regular rate and rhythm, S1-S2, no murmurs  ABD: Soft, non-tender, non-distended, +BS  EXT: No edema  NEURO: AAOx3

## 2020-08-13 NOTE — CHART NOTE - NSCHARTNOTEFT_GEN_A_CORE
Pre cath note:    indication:  [ ] STEMI                [x] NSTEMI                 [ ] Acute coronary syndrome                     [ ]Unstable Angina   [ ] high risk  [ ] intermediate risk  [ ] low risk                     [ ] Stable Angina     non-invasive testing:                          Date:                     result: [ ] high risk  [ ] intermediate risk  [ ] low risk    Anti- Anginal medications:                    [x] not used                       [ ] used                   [ ] not used but strong indication not to use    Ejection Fraction                   [ ] <29            [ ] 30-39%   [ ] 40-49%     [x]>50%    CHF                   [ ] active (within last 14 days on meds   [ ] Chronic (on meds but no exacerbation)    COPD                   [ ] mild (on chronic bronchodilators)  [ ] moderate (on chronic steroid therapy)      [ ] severe (indication for home O2 or PACO2 >50)    Other risk factors:                       [x] Previous MI                     [ ] CVA/ stroke                    [ ] carotid stent/ CEA                    [ ] PVD/PAD- (arterial aneurysm, non-palpable pulses, tortuous vessel with inability to insert catheter, infra-renal dissection, renal or subclavian artery stenosis)                    [ ] diabetic                    [x] previous CABG                    [ ] Renal Failure                           13.2   10.47 )-----------( 311      ( 12 Aug 2020 07:05 )             39.2     08-12    138  |  96<L>  |  22<H>  ----------------------------<  158<H>  3.9   |  28  |  1.3    Ca    9.7      12 Aug 2020 07:05  Phos  3.3     08-11  Mg     1.7     08-12                           -Patient Schedule for LHC/PCI tomorrow at : add on  -Keep NPO after midnight  -Hold Anticoagulation prior to PCI: Last dose of Lovenox for anticoagulation given on 8/12/2020; will hold Lovenox prior to cardiac cath  -Start IV Fluids NS at : 200ml (3ml/Kg) for 1 Hr prior to procedure

## 2020-08-13 NOTE — PROGRESS NOTE ADULT - SUBJECTIVE AND OBJECTIVE BOX
MALIAH LOZA  74y, Female    All available historical data reviewed    OVERNIGHT EVENTS:  no fevers  feels well and has no complaints     ROS:  General: Denies rigors, nightsweats  HEENT: Denies headache, rhinorrhea, sore throat, eye pain  CV: Denies CP, palpitations  PULM: Denies wheezing, hemoptysis  GI: Denies hematemesis, hematochezia, melena  : Denies discharge, hematuria  MSK: Denies arthralgias, myalgias  SKIN: Denies rash, lesions  NEURO: Denies paresthesias, weakness  PSYCH: Denies depression, anxiety    VITALS:  T(F): 97.9, Max: 97.9 (08-13-20 @ 05:17)  HR: 80  BP: 124/60  RR: 16Vital Signs Last 24 Hrs  T(C): 36.6 (13 Aug 2020 05:17), Max: 36.6 (13 Aug 2020 05:17)  T(F): 97.9 (13 Aug 2020 05:17), Max: 97.9 (13 Aug 2020 05:17)  HR: 80 (13 Aug 2020 05:17) (65 - 82)  BP: 124/60 (13 Aug 2020 05:17) (124/60 - 174/74)  BP(mean): 107 (12 Aug 2020 11:45) (107 - 107)  RR: 16 (13 Aug 2020 05:17) (16 - 18)  SpO2: --    TESTS & MEASUREMENTS:                        13.2   10.47 )-----------( 311      ( 12 Aug 2020 07:05 )             39.2     08-12    138  |  96<L>  |  22<H>  ----------------------------<  158<H>  3.9   |  28  |  1.3    Ca    9.7      12 Aug 2020 07:05  Mg     1.7     08-12          Culture - Urine (collected 08-07-20 @ 17:30)  Source: .Urine Clean Catch (Midstream)  Final Report (08-08-20 @ 23:07):    >=3 organisms. Probable collection contamination.    Culture - Blood (collected 08-07-20 @ 16:16)  Source: .Blood Blood-Peripheral  Final Report (08-12-20 @ 23:01):    No Growth Final    Culture - Blood (collected 08-07-20 @ 16:16)  Source: .Blood Blood-Peripheral  Final Report (08-12-20 @ 23:01):    No Growth Final    Culture - Urine (collected 08-07-20 @ 12:04)  Source: .Urine Clean Catch (Midstream)  Final Report (08-09-20 @ 13:07):    <10,000 CFU/mL Normal Urogenital Pretty            RADIOLOGY & ADDITIONAL TESTS:  Personal review of radiological diagnostics performed  Echo and EKG results noted when applicable.     MEDICATIONS:  aDENosine Injectable (ADENOSCAN) 60 milliGRAM(s) IV Bolus once  chlorhexidine 4% Liquid 1 Application(s) Topical daily  clopidogrel Tablet 75 milliGRAM(s) Oral daily  gabapentin 600 milliGRAM(s) Oral daily  lidocaine 2% Gel 1 Application(s) Topical once  melatonin 5 milliGRAM(s) Oral at bedtime  NIFEdipine XL 60 milliGRAM(s) Oral daily  pantoprazole    Tablet 40 milliGRAM(s) Oral before breakfast  senna 2 Tablet(s) Oral at bedtime  simvastatin 40 milliGRAM(s) Oral at bedtime  traMADol 50 milliGRAM(s) Oral daily PRN      ANTIBIOTICS:

## 2020-08-13 NOTE — PROGRESS NOTE ADULT - ASSESSMENT
· Assessment		  74 year old female with known CAD s/p CABG, hypertension, dyslipidemic and chronic back pain presented from the urgent clinic for management of atrial fibrillation with RVR.     IMPRESSION;   No ongoing pyelonephritis  No pyuria  BCx/UCx 8/7 NGTD    RECOMMENDATIONS;  No ABx  From ID standpoint could proceed with cardiac intervention  recall prn please

## 2020-08-14 ENCOUNTER — TRANSCRIPTION ENCOUNTER (OUTPATIENT)
Age: 75
End: 2020-08-14

## 2020-08-14 VITALS — SYSTOLIC BLOOD PRESSURE: 151 MMHG | HEART RATE: 84 BPM | DIASTOLIC BLOOD PRESSURE: 65 MMHG | OXYGEN SATURATION: 96 %

## 2020-08-14 LAB
ANION GAP SERPL CALC-SCNC: 12 MMOL/L — SIGNIFICANT CHANGE UP (ref 7–14)
BUN SERPL-MCNC: 17 MG/DL — SIGNIFICANT CHANGE UP (ref 10–20)
CALCIUM SERPL-MCNC: 9.3 MG/DL — SIGNIFICANT CHANGE UP (ref 8.5–10.1)
CHLORIDE SERPL-SCNC: 97 MMOL/L — LOW (ref 98–110)
CO2 SERPL-SCNC: 29 MMOL/L — SIGNIFICANT CHANGE UP (ref 17–32)
CREAT SERPL-MCNC: 1.2 MG/DL — SIGNIFICANT CHANGE UP (ref 0.7–1.5)
GLUCOSE SERPL-MCNC: 157 MG/DL — HIGH (ref 70–99)
HCT VFR BLD CALC: 40.8 % — SIGNIFICANT CHANGE UP (ref 37–47)
HGB BLD-MCNC: 13.5 G/DL — SIGNIFICANT CHANGE UP (ref 12–16)
MAGNESIUM SERPL-MCNC: 2 MG/DL — SIGNIFICANT CHANGE UP (ref 1.8–2.4)
MCHC RBC-ENTMCNC: 29.6 PG — SIGNIFICANT CHANGE UP (ref 27–31)
MCHC RBC-ENTMCNC: 33.1 G/DL — SIGNIFICANT CHANGE UP (ref 32–37)
MCV RBC AUTO: 89.5 FL — SIGNIFICANT CHANGE UP (ref 81–99)
NRBC # BLD: 0 /100 WBCS — SIGNIFICANT CHANGE UP (ref 0–0)
PLATELET # BLD AUTO: 338 K/UL — SIGNIFICANT CHANGE UP (ref 130–400)
POTASSIUM SERPL-MCNC: 4.1 MMOL/L — SIGNIFICANT CHANGE UP (ref 3.5–5)
POTASSIUM SERPL-SCNC: 4.1 MMOL/L — SIGNIFICANT CHANGE UP (ref 3.5–5)
RBC # BLD: 4.56 M/UL — SIGNIFICANT CHANGE UP (ref 4.2–5.4)
RBC # FLD: 13 % — SIGNIFICANT CHANGE UP (ref 11.5–14.5)
SODIUM SERPL-SCNC: 138 MMOL/L — SIGNIFICANT CHANGE UP (ref 135–146)
WBC # BLD: 13.87 K/UL — HIGH (ref 4.8–10.8)
WBC # FLD AUTO: 13.87 K/UL — HIGH (ref 4.8–10.8)

## 2020-08-14 PROCEDURE — 93010 ELECTROCARDIOGRAM REPORT: CPT

## 2020-08-14 PROCEDURE — 99239 HOSP IP/OBS DSCHRG MGMT >30: CPT

## 2020-08-14 RX ORDER — CLOPIDOGREL BISULFATE 75 MG/1
1 TABLET, FILM COATED ORAL
Qty: 0 | Refills: 0 | DISCHARGE

## 2020-08-14 RX ORDER — DILTIAZEM HCL 120 MG
1 CAPSULE, EXT RELEASE 24 HR ORAL
Qty: 0 | Refills: 0 | DISCHARGE

## 2020-08-14 RX ORDER — CLOPIDOGREL BISULFATE 75 MG/1
1 TABLET, FILM COATED ORAL
Qty: 30 | Refills: 0
Start: 2020-08-14 | End: 2020-09-12

## 2020-08-14 RX ORDER — METOPROLOL TARTRATE 50 MG
1 TABLET ORAL
Qty: 30 | Refills: 0
Start: 2020-08-14 | End: 2020-09-12

## 2020-08-14 RX ORDER — SIMVASTATIN 20 MG/1
1 TABLET, FILM COATED ORAL
Qty: 0 | Refills: 0 | DISCHARGE

## 2020-08-14 RX ORDER — GABAPENTIN 400 MG/1
0 CAPSULE ORAL
Qty: 0 | Refills: 0 | DISCHARGE

## 2020-08-14 RX ORDER — METOPROLOL TARTRATE 50 MG
25 TABLET ORAL DAILY
Refills: 0 | Status: DISCONTINUED | OUTPATIENT
Start: 2020-08-14 | End: 2020-08-14

## 2020-08-14 RX ORDER — TRAMADOL HYDROCHLORIDE 50 MG/1
0 TABLET ORAL
Qty: 0 | Refills: 0 | DISCHARGE

## 2020-08-14 RX ORDER — ATORVASTATIN CALCIUM 80 MG/1
1 TABLET, FILM COATED ORAL
Qty: 30 | Refills: 0
Start: 2020-08-14 | End: 2020-09-12

## 2020-08-14 RX ORDER — ATENOLOL AND CHLORTHALIDONE 50; 25 MG/1; MG/1
1 TABLET ORAL
Qty: 0 | Refills: 0 | DISCHARGE

## 2020-08-14 RX ORDER — PANTOPRAZOLE SODIUM 20 MG/1
1 TABLET, DELAYED RELEASE ORAL
Qty: 30 | Refills: 0
Start: 2020-08-14 | End: 2020-09-12

## 2020-08-14 RX ORDER — ASPIRIN/CALCIUM CARB/MAGNESIUM 324 MG
1 TABLET ORAL
Qty: 30 | Refills: 0
Start: 2020-08-14 | End: 2020-09-12

## 2020-08-14 RX ADMIN — Medication 81 MILLIGRAM(S): at 11:53

## 2020-08-14 RX ADMIN — Medication 60 MILLIGRAM(S): at 05:25

## 2020-08-14 RX ADMIN — APIXABAN 5 MILLIGRAM(S): 2.5 TABLET, FILM COATED ORAL at 05:25

## 2020-08-14 RX ADMIN — TRAMADOL HYDROCHLORIDE 50 MILLIGRAM(S): 50 TABLET ORAL at 05:24

## 2020-08-14 RX ADMIN — Medication 25 MILLIGRAM(S): at 11:55

## 2020-08-14 RX ADMIN — PANTOPRAZOLE SODIUM 40 MILLIGRAM(S): 20 TABLET, DELAYED RELEASE ORAL at 06:27

## 2020-08-14 RX ADMIN — GABAPENTIN 600 MILLIGRAM(S): 400 CAPSULE ORAL at 11:53

## 2020-08-14 RX ADMIN — TRAMADOL HYDROCHLORIDE 50 MILLIGRAM(S): 50 TABLET ORAL at 06:27

## 2020-08-14 RX ADMIN — CLOPIDOGREL BISULFATE 75 MILLIGRAM(S): 75 TABLET, FILM COATED ORAL at 11:53

## 2020-08-14 NOTE — PROGRESS NOTE ADULT - REASON FOR ADMISSION
Palpitations
afib
Palpitations

## 2020-08-14 NOTE — DISCHARGE NOTE NURSING/CASE MANAGEMENT/SOCIAL WORK - PATIENT PORTAL LINK FT
You can access the FollowMyHealth Patient Portal offered by [facility] by registering at the following website: [email or web address]. By joining [facility]’s FollowMyHealth portal, you will also be able to view your health information using other applications (apps) compatible with our system.

## 2020-08-14 NOTE — PROGRESS NOTE ADULT - ATTENDING COMMENTS
patient seen and examined independently     74 year old female with known CAD s/p CABG, hypertension, dyslipidemic and chronic back pain presented from the urgent clinic for management of atrial fibrillation with RVR.       # Afib with RVR -sinus nadira resolved atenolol and cardizem stopped will start toprol 25 qday also for CAD benefit and HR in 80s on tele     #NSTEMI likely demand ischemia type 2: history of CABG on plavix. nuc stress test positive for ischemia.  had cath and 4 stents in RCA spoke to cardiology Dr. Marte and patient to continue with asa 81 , plavix 75 and eliquis 5 BID for one month then cardiology will likely stop asa 81 in one month. will replace simvastatin with lipitor 80 qhs. will start toprol, and resume candesartan on discharge. patient has no chest pain, no sob and walked around this morning    #hypomagnesemia repleted    #presumed UTI on rocephin urine culture neg   today is day 6 of rocephin will stop abx.  cardiology requested ID clearance for cath. I do not see any contraindication to proceed with cath. will get ID as per cardiology request     #DANGELO on CKD 3 improved     # Dyslipidemia  - Continue simvastatin     # Hypertension now controlled will be on toprol, candesartan      cleared by cardiology for discharge   spent 33 min on discharge patient seen and examined independently     74 year old female with known CAD s/p CABG, hypertension, dyslipidemic and chronic back pain presented from the urgent clinic for management of atrial fibrillation with RVR.       # Afib with RVR -sinus nadira resolved atenolol and cardizem stopped will start toprol 25 qday also for CAD benefit and HR in 80s on tele     #NSTEMI likely demand ischemia type 2: history of CABG on plavix. nuc stress test positive for ischemia.  had cath and 4 stents in RCA spoke to cardiology Dr. Marte and patient to continue with asa 81 , plavix 75 and eliquis 5 BID for one month then cardiology will likely stop asa 81 in one month. will replace simvastatin with lipitor 80 qhs. will start toprol, and resume candesartan on discharge. patient has no chest pain, no sob and walked around this morning. groin site looks clean and no signs of bleeding or infection     #leukocytosis no signs of infection, no fever, recheck cbc as OPT in one week     #hypomagnesemia repleted    #presumed UTI on rocephin urine culture neg   today is day 6 of rocephin will stop abx.  cardiology requested ID clearance for cath. I do not see any contraindication to proceed with cath. will get ID as per cardiology request     #DANGELO on CKD 3 improved     # Dyslipidemia  - Continue simvastatin     # Hypertension now controlled will be on toprol, candesartan      cleared by cardiology for discharge   spent 33 min on discharge

## 2020-08-14 NOTE — PROGRESS NOTE ADULT - ASSESSMENT
74 year old female with known CAD s/p CABG, hypertension, dyslipidemic and chronic back pain presented from the urgent clinic for management of atrial fibrillation with RVR.     Patient s/p cath with 4 stents, hemodynamically stable and likely will be discharged today.     # Afib with RVR - currently in NSR  - Episode of chest discomfort while in afib with RVR and mildly elevated troponin  - holding atenolol and diltiazem given bradycardia, will monitor HR and if uncontrolled tomorrow will start cardizem at lower dose   - TSH 1.48 normal   - will started eliquis in am    #Hx CAD s/p CABG, now s/p PCI   #troponemia likely secondary to demand ischemia   - troponins positive   - stress test NM in 2015 unremarkable  - Stress test 8/10 was positive: moderate to large sized reversible defect involving inferior, inferolateral, and lateral left ventricular walls consistent with ischemia   - cath 8/13: Significant 3V disease, atretic LIMA, patent SVG to distal LAD s/p PCI+stentsx1 to distal RCA, s/p PCI+stentsx3 to mid RCA  - will confirm with cardiologist about triple anticoagulant therapy and if aspirin will be discharged   - on aspirin 81mg, plavix 75mg, simvastatin 40mg     #Magnesium deficiency - resolved  #Hypokalemia - resolved  - K and Mg normal   - will monitor BMP     # DANGELO likely pre-renal secondary to dehydration due to vomiting/diarrhea vs. afib with RVR   - Baseline creatinine normal; Cr downtrended today   - Trending BMP  - Holding home candesartan and chlorthalidone    # presumed UTI now asymptomatic   - ID consult: no abx needed  - ucx and bcx negative     # Hyperlipidemia   - Continue simvastatin   - lipid panel was unremarkable     # new Type 2 DM   - A1C 6.7 with morning BMP glucose above 126  - will continue to monitor glucose via BMP; if persistently > 180 will start insulin   - f/u with PCP with starting metformin/oral antidiabetics     # Hypertensive  - Holding ARB, Chlorthalidone, atenolol as above  - nifedipine 60mg daily added today   - monitor BP tomorrow     # Chronic back pain   - Keep Gabapentin, tramadol PRN    #Diarrhea resolved  - reglan prn     # Diet > DASH/TLC  # DVT > Lovenox 60mg bid  Code status: full code   GI ppx: not needed

## 2020-08-14 NOTE — CHART NOTE - NSCHARTNOTEFT_GEN_A_CORE
RD Limited Note:    Provided DM/heart healthy diet education. Newly dx T2DM (HgbA1C 6.7 on 8/8/2020). Extensively reviewed carbohydrate sources, reading nutrition facts label, pairing carbs w/ protein/fat, hidden sources of sodium, and sample meal plan. Pt cooks for herself, rarely dines out, does not use salt shaker. Willing to make changes. Expect good compliance. Discharge orders for today.

## 2020-08-14 NOTE — DISCHARGE NOTE PROVIDER - NSDCMRMEDTOKEN_GEN_ALL_CORE_FT
atenolol-chlorthalidone 50 mg-25 mg oral tablet: 1 tab(s) orally once a day  candesartan 32 mg oral tablet: 1 tab(s) orally once a day  clindamycin 150 mg oral capsule: 1 cap(s) orally every 6 hours  clopidogrel 75 mg oral tablet: 1 tab(s) orally once a day  DilTIAZem (Eqv-Cardizem CD) 180 mg/24 hours oral capsule, extended release: 1 cap(s) orally once a day  Eliquis 5 mg oral tablet: 1 tab(s) orally 2 times a day   gabapentin 600 mg oral tablet:   simvastatin 40 mg oral tablet: 1 tab(s) orally once a day (at bedtime)  traMADol 50 mg oral tablet: aspirin 81 mg oral tablet, chewable: 1 tab(s) orally once a day  candesartan 32 mg oral tablet: 1 tab(s) orally once a day  clopidogrel 75 mg oral tablet: 1 tab(s) orally once a day  Eliquis 5 mg oral tablet: 1 tab(s) orally 2 times a day   gabapentin 600 mg oral tablet: orally once a day  Lipitor 80 mg oral tablet: 1 tab(s) orally once a day (at bedtime)   metoprolol succinate 25 mg oral tablet, extended release: 1 tab(s) orally once a day  pantoprazole 40 mg oral delayed release tablet: 1 tab(s) orally once a day (before a meal)

## 2020-08-14 NOTE — DISCHARGE NOTE PROVIDER - HOSPITAL COURSE
74 year old F with PMHx CAD sp CABG, hypertension, dyslipidemic, chronic back pain presented from the urgent clinic for management of atrial fibrillation with RVR.     Current history goes back to 4 days prior when the patient began experiencing dysuria, frequency and urgency. Patient was given oral antibiotic 2 days prior at urgent care.    1 day prior, patient develops diarrhea, non-bloody, non-mucoid, large volume x5 episodes associated with non-billous non-bloody x10 episodes of vomiting. Patient returns to urgent care for assessment. During her visit, patient experienced acute onset palpitations associated with pressure like chest discomfort localized to the retrosternal region and non-radiating. No diaphoresis, no dizziness, no syncope or presyncope. An ECG was done in the urgent care which showed afib with RVR, patient hypotensive during the episode, transfered by EMS to [facility] for management.         Hospital course: 74 year old F with PMHx CAD sp CABG, hypertension, dyslipidemic, chronic back pain presented from the urgent clinic for management of atrial fibrillation with RVR.     Current history goes back to 4 days prior when the patient began experiencing dysuria, frequency and urgency. Patient was given oral antibiotic 2 days prior at urgent care.    1 day prior, patient develops diarrhea, non-bloody, non-mucoid, large volume x5 episodes associated with non-billous non-bloody x10 episodes of vomiting. Patient returns to urgent care for assessment. During her visit, patient experienced acute onset palpitations associated with pressure like chest discomfort localized to the retrosternal region and non-radiating. No diaphoresis, no dizziness, no syncope or presyncope. An ECG was done in the urgent care which showed afib with RVR, patient hypotensive during the episode, transfered by EMS to [facility] for management. In ED trops were positive with chest pain consistent with NSTEMI vs. demand ischemia from afib and ECG showed afib with RVR (atrial rate 394, ventricular rate 102). Patient also had DANGELO (on CKD) likely secondary to afib. Patient was septic in ED with fevers and positive WBC.         Hospital course:    After ED, patient was afebrile and hemodynamically stable, sepsis resolved. Home candesartan was stopped due to DANGELO. Patient was moderately hypertensive and was started on nifedipine 60mg afterwhich BP was controlled. Patient was initially rate controlled with her atenolol and added cardizem and started on Eliquis 5mg bid. Patient started become mildly bradycardic on atenolol. She was persistently bradycardic on cardizem and was taken off. Rate was controlled off both cardizem and atenolol. Patient had negative stress test in 2015 but due to positive troponins she had a repeat stress test which was positive. Her u/a was negative despite recent UTI and ID cleared her off abx for cath. Cath 8/13: Significant 3V disease, atretic LIMA, patent SVG to distal LAD s/p PCI+stentsx1 to distal RCA, s/p PCI+stentsx3 to mid RCA. DANGELO (on CKD) resolved. Patient will be discharged on toprol 25mg daily and her home candesartan. Will replace simvastatin with lipitor 80mg hs. Patient will resume triple therapy (asa 81mg, plavix 75mg, and eliquis BID) for one month then will likely stop aspirin according to Dr. Marte. Course complicated by hypomagnesemia which was repleted. Patient was hemodynamically stable and cleared by cardio for discharge. 74 year old F with PMHx CAD sp CABG, hypertension, dyslipidemic, chronic back pain presented from the urgent clinic for management of atrial fibrillation with RVR.     Current history goes back to 4 days prior when the patient began experiencing dysuria, frequency and urgency. Patient was given oral antibiotic 2 days prior at urgent care.    1 day prior, patient develops diarrhea, non-bloody, non-mucoid, large volume x5 episodes associated with non-billous non-bloody x10 episodes of vomiting. Patient returns to urgent care for assessment. During her visit, patient experienced acute onset palpitations associated with pressure like chest discomfort localized to the retrosternal region and non-radiating. No diaphoresis, no dizziness, no syncope or presyncope. An ECG was done in the urgent care which showed afib with RVR, patient hypotensive during the episode, transfered by EMS to [facility] for management.         In ED trops were positive with chest pain consistent with NSTEMI vs. demand ischemia from afib and ECG showed afib with RVR (atrial rate 394, ventricular rate 102). Patient also had DANGELO (on CKD) likely secondary to afib. Patient was septic in ED with fevers and positive WBC.         Hospital course:    After ED, patient was afebrile and hemodynamically stable, sepsis resolved. Home candesartan was stopped due to DANGELO. Patient was moderately hypertensive and was started on nifedipine 60mg afterwhich BP was controlled. Patient was initially rate controlled with her atenolol and added cardizem and started on Eliquis 5mg bid. Patient started become mildly bradycardic on atenolol. She was persistently bradycardic on cardizem and was taken off. Rate was controlled off both cardizem and atenolol. Patient had negative stress test in 2015 but due to positive troponins she had a repeat stress test which was positive. Her u/a was negative despite recent UTI and ID cleared her off abx for cath. Cath 8/13: Significant 3V disease, atretic LIMA, patent SVG to distal LAD s/p PCI+stentsx1 to distal RCA, s/p PCI+stentsx3 to mid RCA. DANGELO (on CKD) resolved. Patient will be discharged on toprol 25mg daily and her home candesartan. Will replace simvastatin with lipitor 80mg hs. Patient will resume triple therapy (asa 81mg, plavix 75mg, and eliquis BID) for one month then will likely stop aspirin according to Dr. Marte. Course complicated by hypomagnesemia which was repleted. Patient was hemodynamically stable and cleared by cardio for discharge.

## 2020-08-14 NOTE — PROGRESS NOTE ADULT - SUBJECTIVE AND OBJECTIVE BOX
SUBJECTIVE:    Patient is a 74y old Female who presents with a chief complaint of Palpitations (13 Aug 2020 16:52)    Currently admitted to medicine with the primary diagnosis of Septic shock     Today is hospital day 7d.     Overnight no events.    This morning patient only reports some soreness in groin at site of catheter insertion but reports no bleeding. Patient reports her chronic back pain and some occasional palpitations. Denies CP, SOB, fevers, dizziness.     PAST MEDICAL & SURGICAL HISTORY  Heart attack  HTN (hypertension)  CAD (coronary artery disease)  Chronic midline back pain, unspecified back location  S/P CABG (coronary artery bypass graft)  History of appendectomy   delivery delivered  H/O appendicitis  History of right wrist replacement      ALLERGIES:  penicillin (Hives)  tizanidine (Hives)    MEDICATIONS:  STANDING MEDICATIONS  aDENosine Injectable (ADENOSCAN) 60 milliGRAM(s) IV Bolus once  apixaban 5 milliGRAM(s) Oral every 12 hours  aspirin  chewable 81 milliGRAM(s) Oral daily  chlorhexidine 4% Liquid 1 Application(s) Topical daily  clopidogrel Tablet 75 milliGRAM(s) Oral daily  gabapentin 600 milliGRAM(s) Oral daily  lidocaine 2% Gel 1 Application(s) Topical once  melatonin 5 milliGRAM(s) Oral at bedtime  NIFEdipine XL 60 milliGRAM(s) Oral daily  pantoprazole    Tablet 40 milliGRAM(s) Oral before breakfast  senna 2 Tablet(s) Oral at bedtime  simvastatin 40 milliGRAM(s) Oral at bedtime  sodium chloride 0.9%. 1000 milliLiter(s) IV Continuous <Continuous>    PRN MEDICATIONS    VITALS:   T(F): 96.6  HR: 78  BP: 150/65  RR: 18  SpO2: 96%    LABS:                        13.5   13.87 )-----------( 338      ( 14 Aug 2020 06:18 )             40.8     08-14    138  |  97<L>  |  17  ----------------------------<  157<H>  4.1   |  29  |  1.2    Ca    9.3      14 Aug 2020 06:18  Mg     2.0     08-14        RADIOLOGY:    no new scans    PHYSICAL EXAM:  GEN: No acute distress  PULM/CHEST: Clear to auscultation bilaterally, no rales, rhonchi or wheezes   CVS: Regular rate and rhythm, S1-S2, no murmurs  ABD: Soft, non-tender, non-distended, +BS  EXT: No edema  NEURO: AAOx3

## 2020-08-14 NOTE — DISCHARGE NOTE PROVIDER - CARE PROVIDER_API CALL
MILLER GATES  Cardiovascular Disease  [address] [place] [id number]  Phone: [phone]  Fax: [phone]  Follow Up Time: 1 week    Chris Moreno  Internal Medicine  [address] [place] [address]  Phone: [phone]  Fax: [phone]  Follow Up Time: 1 week

## 2020-08-14 NOTE — DISCHARGE NOTE PROVIDER - NSDCCPCAREPLAN_GEN_ALL_CORE_FT
PRINCIPAL DISCHARGE DIAGNOSIS  Diagnosis: Afib  Assessment and Plan of Treatment: You developed a new atrial fibrillation. This is an abnormal rhythm in your heart that can sometimes cause your heart rate to increase. You were put on some medications to rate control but they slowed your heart rate so we stopped them. You were started on anticoagulation (eliquis) because atrial fibrillation can be high risk for stroke. You will be started on a medium dose beta blocker for your rate and will resume your candesartan for your blood pressure.      SECONDARY DISCHARGE DIAGNOSES  Diagnosis: Non-ST elevation MI (NSTEMI)  Assessment and Plan of Treatment: You had a heart attack that likely was developing overtime. Your cardiac enzymes were elevated. You had a catheterization on 8/13 in which 4 stents were put in your coronary arteries (they supply blood to your heart muscles) to open the arteries. PRINCIPAL DISCHARGE DIAGNOSIS  Diagnosis: Afib  Assessment and Plan of Treatment: You developed a new atrial fibrillation. This is an abnormal rhythm in your heart that can sometimes cause your heart rate to increase. You were put on some medications to rate control but they slowed your heart rate so we stopped them. You were started on anticoagulation (eliquis) because atrial fibrillation can be high risk for stroke. You will be started on a medium dose beta blocker for your rate and will resume your candesartan for your blood pressure.      SECONDARY DISCHARGE DIAGNOSES  Diagnosis: Non-ST elevation MI (NSTEMI)  Assessment and Plan of Treatment: You had a heart attack that likely was developing overtime. Your cardiac enzymes were elevated. You had a catheterization on 8/13 in which 4 stents were put in your coronary arteries (they supply blood to your heart muscles) to open the arteries.  Please follow up with cardiology within a week.

## 2020-08-14 NOTE — DISCHARGE NOTE PROVIDER - PROVIDER TOKENS
PROVIDER:[TOKEN:[[id number]],FOLLOWUP:[1 week]],PROVIDER:[TOKEN:[[id number]],FOLLOWUP:[1 week]]

## 2020-08-14 NOTE — PROGRESS NOTE ADULT - SUBJECTIVE AND OBJECTIVE BOX
Cardiology Follow up    MALIHA LOZA   74y Female  PAST MEDICAL & SURGICAL HISTORY:  Heart attack  HTN (hypertension)  CAD (coronary artery disease)  Chronic midline back pain, unspecified back location  S/P CABG (coronary artery bypass graft)  History of appendectomy   delivery delivered  H/O appendicitis  History of right wrist replacement     HPI:  74 year old female patient presented from the urgent clinic for management of atrial fibrillation with RVR.     Known CAD sp CABG, hypertension, dyslipidemic, chronic back pain.     Current history goes back to 4 days ptp when the patient began experiencing dysuria, frequency and urgency. Patient seeked medical attention 2 days PTP for the dysuria and was discharged on an unknown oral antibiotic.   1 day ptp, patient develops diarrhea, non-bloody, non-mucoid, large volume x5 episodes associated with non-billous non-bloody x10 episodes of vomiting. Patient returns to urgent care for assessment. During her visit, patient experienced acute onset palpitations associated with pressure like chest discomfort localized to the retrosternal region and non-radiating. No diaphoresis, no dizziness, no syncope or presyncope. An ECG was done in the urgent care which showed afib with RVR, patient hypotensive during the episode, transfered by EMS to [facility] for management. (07 Aug 2020 22:23)    Allergies    penicillin (Hives)  tizanidine (Hives)    Intolerances    Patient examined at bedside  Patient c/o mild palpitations overnight.   Pt ambulated without issues/symptoms  Denies CP, SOB, or dizziness  Tachycardia noted on tele overnight    Vital Signs Last 24 Hrs  T(C): 35.9 (14 Aug 2020 04:52), Max: 36.5 (13 Aug 2020 20:30)  T(F): 96.6 (14 Aug 2020 04:52), Max: 97.7 (13 Aug 2020 20:30)  HR: 78 (14 Aug 2020 04:52) (71 - 78)  BP: 150/65 (14 Aug 2020 04:52) (129/62 - 150/65)  BP(mean): --  RR: 18 (14 Aug 2020 04:52) (18 - 18)  SpO2: 96% (14 Aug 2020 00:20) (96% - 96%)    MEDICATIONS  (STANDING):  aDENosine Injectable (ADENOSCAN) 60 milliGRAM(s) IV Bolus once  apixaban 5 milliGRAM(s) Oral every 12 hours  aspirin  chewable 81 milliGRAM(s) Oral daily  chlorhexidine 4% Liquid 1 Application(s) Topical daily  clopidogrel Tablet 75 milliGRAM(s) Oral daily  gabapentin 600 milliGRAM(s) Oral daily  lidocaine 2% Gel 1 Application(s) Topical once  melatonin 5 milliGRAM(s) Oral at bedtime  metoprolol succinate ER 25 milliGRAM(s) Oral daily  NIFEdipine XL 60 milliGRAM(s) Oral daily  pantoprazole    Tablet 40 milliGRAM(s) Oral before breakfast  senna 2 Tablet(s) Oral at bedtime  sodium chloride 0.9%. 1000 milliLiter(s) (100 mL/Hr) IV Continuous <Continuous>    MEDICATIONS  (PRN):    REVIEW OF SYSTEMS:          All negative except as mentioned in HPI    PHYSICAL EXAM:           CONSTITUTIONAL: Well-developed; well-nourished; in no acute distress  	SKIN: warm, dry  	HEAD: Normocephalic; atraumatic  	EYES: PERRL.  	ENT: No nasal discharge, airway clear, mucous membranes moist  	NECK: Supple; non tender.  	CARD: +S1, +S2, no murmurs, gallops, or rubs. irregular rate and rhythm    	RESP: No wheezes, rales or rhonchi. CTA B/L  	ABD: soft ntnd, + BS x 4 quadrants  	EXT: moves all extremities,  no clubbing, cyanosis or edema  	NEURO: Alert and oriented x3, no focal deficits          PSYCH: Cooperative, appropriate          VASCULAR:  + Rad / + PTs / + DPs          EXTREMITY:             Right Groin:  pressure dressing removed, access site soft, no hematoma, no pain, + pulses, no sign of infection, no numbness  	             ECG: < from: 12 Lead ECG (20 @ 06:48) >  Ventricular Rate 84 BPM    Atrial Rate 84 BPM    P-R Interval 176 ms    QRS Duration 106 ms    Q-T Interval 362 ms    QTC Calculation(Bezet) 427 ms    P Axis 51 degrees    R Axis 1 degrees    T Axis 124 degrees    Diagnosis Line Normal sinus rhythm  Left ventricular hypertrophy with repolarization abnormality  Possible IWMI  Abnormal ECG                                                                                                                2D ECHO:  < from: Transthoracic Echocardiogram (20 @ 10:06) >  Summary:   1. Left ventricular ejection fraction, by visual estimation, is 50 to 55%.   2. Mildly increased LV wall thickness.   3. The left ventricular diastolic function could not be assessed inthis study.   4. Mild mitral valve regurgitation.   5. Sclerotic aortic valve with normal opening.    LABS:                        13.5   13.87 )-----------( 338      ( 14 Aug 2020 06:18 )             40.8     08-14    138  |  97<L>  |  17  ----------------------------<  157<H>  4.1   |  29  |  1.2    Ca    9.3      14 Aug 2020 06:18  Mg     2.0         Magnesium, Serum: 2.0 mg/dL [1.8 - 2.4] (20 @ 06:18)      A/P:  I discussed the case with Cardiologist Dr. Marte and recommend the following:    S/P PCI :  INTERVENTION: s/p PCI+stentsx1 to distal RCA, s/p PCI+stentsx3 to mid RCA    	     Continue DAPT ( Aspirin 81 mg PO Daily and Plavix 75 mg daily ),  B-Blocker, Statin Therapy, ARB                   Patient given 30 day supply of ( Aspirin 81 mg daily and Plavix 75 mg daily ) to take at home                   RESUME Atorvastatin 80 mg po daily at bedtime                   Resume candesartan                   Resume Eliquis 5mg po twice a day                   START Toprol XL 25mg po daily                   OOB ambulate                   Monitor access site                   Patient agreeing to take DAPT for at least one year or as directed by cardiologist                    Pt given instructions on importance of taking antiplatelet medication or risk acute stent thrombosis/death                   Post cath instructions, access site care and activity restrictions reviewed with patient                     Discussed with patient to return to hospital if experience chest pain, shortness breath, dizziness and site bleeding                   Aggressive risk factor modification, diet counseling, smoking cessation discussed with patient                       Can discharge patient from cardiac standpoint after ambulating without symptoms, access site wnl, labs and ECG reviewed                    Follow up with Cardiology Dr. Marte in two weeks.  Instructed to call and make an appointment

## 2020-08-20 NOTE — CDI QUERY NOTE - NSCDIOTHERTXTBX_GEN_ALL_CORE_HH
Dr. Lea,    75 y/o female admitted with chest pain.    Vitals in the ED:  BP 86/49, , RR 18, T 97.6 WBC 15.91  Lactate 7.5  ED note:  "Patient is suffering from sepsis"  Critical care consult 8/8/20:  "Sepsis present on admission"    Patient was started on     Please document clarification on the status of sepsis:       Sepsis present on admission     Sepsis ruled out after study     Other (please specify)     Clinically unable to determine    Thank you for your assistance.  Monica Holcomb   [facility] Department  [email or web address]  [phone] Dr. Lea,    75 y/o female admitted with chest pain.    Vitals in the ED:  BP 86/49, , RR 18, T 97.6 WBC 15.91  Lactate 7.5  ED note:  "Patient is suffering from sepsis"  Critical care consult 8/8/20:  "Sepsis present on admission"    Patient was started on Ceftriaxone in ED.    Please document clarification on the status of sepsis:       Sepsis present on admission     Sepsis ruled out after study     Other (please specify)     Clinically unable to determine    Thank you for your assistance.  Monica Holcomb   [facility] Department  [email or web address]  [phone]

## 2020-08-27 DIAGNOSIS — Z96.631 PRESENCE OF RIGHT ARTIFICIAL WRIST JOINT: ICD-10-CM

## 2020-08-27 DIAGNOSIS — Z88.0 ALLERGY STATUS TO PENICILLIN: ICD-10-CM
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
## 2020-08-27 DIAGNOSIS — E11.22 TYPE 2 DIABETES MELLITUS WITH DIABETIC CHRONIC KIDNEY DISEASE: ICD-10-CM

## 2020-08-27 DIAGNOSIS — T82.898A OTHER SPECIFIED COMPLICATION OF VASCULAR PROSTHETIC DEVICES, IMPLANTS AND GRAFTS, INITIAL ENCOUNTER: ICD-10-CM

## 2020-08-27 DIAGNOSIS — Z95.1 PRESENCE OF AORTOCORONARY BYPASS GRAFT: ICD-10-CM

## 2020-08-27 DIAGNOSIS — R00.1 BRADYCARDIA, UNSPECIFIED: ICD-10-CM

## 2020-08-27 DIAGNOSIS — E78.5 HYPERLIPIDEMIA, UNSPECIFIED: ICD-10-CM

## 2020-08-27 DIAGNOSIS — I21.A1 MYOCARDIAL INFARCTION TYPE 2: ICD-10-CM
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
## 2020-08-27 DIAGNOSIS — N39.0 URINARY TRACT INFECTION, SITE NOT SPECIFIED: ICD-10-CM

## 2020-08-27 DIAGNOSIS — E87.6 HYPOKALEMIA: ICD-10-CM

## 2020-08-27 DIAGNOSIS — I48.91 UNSPECIFIED ATRIAL FIBRILLATION: ICD-10-CM

## 2020-08-27 DIAGNOSIS — N18.3 CHRONIC KIDNEY DISEASE, STAGE 3 (MODERATE): ICD-10-CM

## 2020-08-27 DIAGNOSIS — E83.42 HYPOMAGNESEMIA: ICD-10-CM

## 2020-08-27 DIAGNOSIS — M54.9 DORSALGIA, UNSPECIFIED: ICD-10-CM

## 2020-08-27 DIAGNOSIS — N17.9 ACUTE KIDNEY FAILURE, UNSPECIFIED: ICD-10-CM

## 2020-08-27 DIAGNOSIS — Q24.5 MALFORMATION OF CORONARY VESSELS: ICD-10-CM

## 2020-08-27 DIAGNOSIS — I12.9 HYPERTENSIVE CHRONIC KIDNEY DISEASE WITH STAGE 1 THROUGH STAGE 4 CHRONIC KIDNEY DISEASE, OR UNSPECIFIED CHRONIC KIDNEY DISEASE: ICD-10-CM

## 2020-08-27 DIAGNOSIS — G89.29 OTHER CHRONIC PAIN: ICD-10-CM

## 2020-08-27 DIAGNOSIS — I25.2 OLD MYOCARDIAL INFARCTION: ICD-10-CM

## 2020-08-27 DIAGNOSIS — I25.10 ATHEROSCLEROTIC HEART DISEASE OF NATIVE CORONARY ARTERY WITHOUT ANGINA PECTORIS: ICD-10-CM

## 2020-08-27 DIAGNOSIS — Z79.02 LONG TERM (CURRENT) USE OF ANTITHROMBOTICS/ANTIPLATELETS: ICD-10-CM

## 2020-09-08 ENCOUNTER — EMERGENCY (EMERGENCY)
Facility: HOSPITAL | Age: 75
LOS: 0 days | Discharge: HOME | End: 2020-09-08
Attending: STUDENT IN AN ORGANIZED HEALTH CARE EDUCATION/TRAINING PROGRAM
Payer: MEDICARE

## 2020-09-08 VITALS
OXYGEN SATURATION: 98 % | SYSTOLIC BLOOD PRESSURE: 180 MMHG | WEIGHT: 126.99 LBS | TEMPERATURE: 98 F | HEART RATE: 75 BPM | DIASTOLIC BLOOD PRESSURE: 100 MMHG | RESPIRATION RATE: 18 BRPM | HEIGHT: 68 IN

## 2020-09-08 VITALS
HEART RATE: 70 BPM | SYSTOLIC BLOOD PRESSURE: 168 MMHG | RESPIRATION RATE: 16 BRPM | DIASTOLIC BLOOD PRESSURE: 77 MMHG | OXYGEN SATURATION: 99 %

## 2020-09-08 DIAGNOSIS — I25.10 ATHEROSCLEROTIC HEART DISEASE OF NATIVE CORONARY ARTERY WITHOUT ANGINA PECTORIS: ICD-10-CM

## 2020-09-08 DIAGNOSIS — Z88.8 ALLERGY STATUS TO OTHER DRUGS, MEDICAMENTS AND BIOLOGICAL SUBSTANCES: ICD-10-CM

## 2020-09-08 DIAGNOSIS — Z96.631 PRESENCE OF RIGHT ARTIFICIAL WRIST JOINT: Chronic | ICD-10-CM

## 2020-09-08 DIAGNOSIS — I48.91 UNSPECIFIED ATRIAL FIBRILLATION: ICD-10-CM

## 2020-09-08 DIAGNOSIS — Z79.82 LONG TERM (CURRENT) USE OF ASPIRIN: ICD-10-CM
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
## 2020-09-08 DIAGNOSIS — Z95.1 PRESENCE OF AORTOCORONARY BYPASS GRAFT: Chronic | ICD-10-CM

## 2020-09-08 DIAGNOSIS — H53.8 OTHER VISUAL DISTURBANCES: ICD-10-CM

## 2020-09-08 DIAGNOSIS — Z98.890 OTHER SPECIFIED POSTPROCEDURAL STATES: Chronic | ICD-10-CM

## 2020-09-08 DIAGNOSIS — I10 ESSENTIAL (PRIMARY) HYPERTENSION: ICD-10-CM
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
## 2020-09-08 DIAGNOSIS — R11.0 NAUSEA: ICD-10-CM
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
## 2020-09-08 DIAGNOSIS — Z87.19 PERSONAL HISTORY OF OTHER DISEASES OF THE DIGESTIVE SYSTEM: Chronic | ICD-10-CM

## 2020-09-08 DIAGNOSIS — Z79.899 OTHER LONG TERM (CURRENT) DRUG THERAPY: ICD-10-CM

## 2020-09-08 LAB
ALBUMIN SERPL ELPH-MCNC: 4.3 G/DL — SIGNIFICANT CHANGE UP (ref 3.5–5.2)
ALP SERPL-CCNC: 67 U/L — SIGNIFICANT CHANGE UP (ref 30–115)
ALT FLD-CCNC: 23 U/L — SIGNIFICANT CHANGE UP (ref 0–41)
ANION GAP SERPL CALC-SCNC: 8 MMOL/L — SIGNIFICANT CHANGE UP (ref 7–14)
APTT BLD: 34.2 SEC — SIGNIFICANT CHANGE UP (ref 27–39.2)
AST SERPL-CCNC: 30 U/L — SIGNIFICANT CHANGE UP (ref 0–41)
BASOPHILS # BLD AUTO: 0.06 K/UL — SIGNIFICANT CHANGE UP (ref 0–0.2)
BASOPHILS NFR BLD AUTO: 0.7 % — SIGNIFICANT CHANGE UP (ref 0–1)
BILIRUB SERPL-MCNC: 0.5 MG/DL — SIGNIFICANT CHANGE UP (ref 0.2–1.2)
BLD GP AB SCN SERPL QL: SIGNIFICANT CHANGE UP
BUN SERPL-MCNC: 16 MG/DL — SIGNIFICANT CHANGE UP (ref 10–20)
CALCIUM SERPL-MCNC: 9.6 MG/DL — SIGNIFICANT CHANGE UP (ref 8.5–10.1)
CHLORIDE SERPL-SCNC: 103 MMOL/L — SIGNIFICANT CHANGE UP (ref 98–110)
CO2 SERPL-SCNC: 28 MMOL/L — SIGNIFICANT CHANGE UP (ref 17–32)
CREAT SERPL-MCNC: 1 MG/DL — SIGNIFICANT CHANGE UP (ref 0.7–1.5)
EOSINOPHIL # BLD AUTO: 0.22 K/UL — SIGNIFICANT CHANGE UP (ref 0–0.7)
EOSINOPHIL NFR BLD AUTO: 2.6 % — SIGNIFICANT CHANGE UP (ref 0–8)
GLUCOSE SERPL-MCNC: 86 MG/DL — SIGNIFICANT CHANGE UP (ref 70–99)
HCT VFR BLD CALC: 40.2 % — SIGNIFICANT CHANGE UP (ref 37–47)
HGB BLD-MCNC: 13.3 G/DL — SIGNIFICANT CHANGE UP (ref 12–16)
IMM GRANULOCYTES NFR BLD AUTO: 0.4 % — HIGH (ref 0.1–0.3)
INR BLD: 1.23 RATIO — SIGNIFICANT CHANGE UP (ref 0.65–1.3)
LYMPHOCYTES # BLD AUTO: 1.74 K/UL — SIGNIFICANT CHANGE UP (ref 1.2–3.4)
LYMPHOCYTES # BLD AUTO: 20.7 % — SIGNIFICANT CHANGE UP (ref 20.5–51.1)
MCHC RBC-ENTMCNC: 29.2 PG — SIGNIFICANT CHANGE UP (ref 27–31)
MCHC RBC-ENTMCNC: 33.1 G/DL — SIGNIFICANT CHANGE UP (ref 32–37)
MCV RBC AUTO: 88.4 FL — SIGNIFICANT CHANGE UP (ref 81–99)
MONOCYTES # BLD AUTO: 0.78 K/UL — HIGH (ref 0.1–0.6)
MONOCYTES NFR BLD AUTO: 9.3 % — SIGNIFICANT CHANGE UP (ref 1.7–9.3)
NEUTROPHILS # BLD AUTO: 5.59 K/UL — SIGNIFICANT CHANGE UP (ref 1.4–6.5)
NEUTROPHILS NFR BLD AUTO: 66.3 % — SIGNIFICANT CHANGE UP (ref 42.2–75.2)
NRBC # BLD: 0 /100 WBCS — SIGNIFICANT CHANGE UP (ref 0–0)
PLATELET # BLD AUTO: 248 K/UL — SIGNIFICANT CHANGE UP (ref 130–400)
POTASSIUM SERPL-MCNC: 4.3 MMOL/L — SIGNIFICANT CHANGE UP (ref 3.5–5)
POTASSIUM SERPL-SCNC: 4.3 MMOL/L — SIGNIFICANT CHANGE UP (ref 3.5–5)
PROT SERPL-MCNC: 7.2 G/DL — SIGNIFICANT CHANGE UP (ref 6–8)
PROTHROM AB SERPL-ACNC: 14.2 SEC — HIGH (ref 9.95–12.87)
RBC # BLD: 4.55 M/UL — SIGNIFICANT CHANGE UP (ref 4.2–5.4)
RBC # FLD: 13.1 % — SIGNIFICANT CHANGE UP (ref 11.5–14.5)
SODIUM SERPL-SCNC: 139 MMOL/L — SIGNIFICANT CHANGE UP (ref 135–146)
WBC # BLD: 8.42 K/UL — SIGNIFICANT CHANGE UP (ref 4.8–10.8)
WBC # FLD AUTO: 8.42 K/UL — SIGNIFICANT CHANGE UP (ref 4.8–10.8)

## 2020-09-08 PROCEDURE — 74177 CT ABD & PELVIS W/CONTRAST: CPT | Mod: 26

## 2020-09-08 PROCEDURE — 99285 EMERGENCY DEPT VISIT HI MDM: CPT

## 2020-09-08 PROCEDURE — 70450 CT HEAD/BRAIN W/O DYE: CPT | Mod: 26

## 2020-09-08 PROCEDURE — 71260 CT THORAX DX C+: CPT | Mod: 26

## 2020-09-08 PROCEDURE — 72125 CT NECK SPINE W/O DYE: CPT | Mod: 26

## 2020-09-08 NOTE — ED PROVIDER NOTE - PATIENT PORTAL LINK FT
You can access the FollowMyHealth Patient Portal offered by [facility] by registering at the following website: [email or web address]. By joining [employer]’s FollowMyHealth portal, you will also be able to view your health information using other applications (apps) compatible with our system.

## 2020-09-08 NOTE — ED ADULT NURSE NOTE - NSIMPLEMENTINTERV_GEN_ALL_ED
Implemented All Fall with Harm Risk Interventions:  [place] to call system. Call bell, personal items and telephone within reach. Instruct patient to call for assistance. Room bathroom lighting operational. Non-slip footwear when patient is off stretcher. Physically safe environment: no spills, clutter or unnecessary equipment. Stretcher in lowest position, wheels locked, appropriate side rails in place. Provide visual cue, wrist band, yellow gown, etc. Monitor gait and stability. Monitor for mental status changes and reorient to person, place, and time. Review medications for side effects contributing to fall risk. Reinforce activity limits and safety measures with patient and family. Provide visual clues: red socks.

## 2020-09-08 NOTE — ED PROVIDER NOTE - NSFOLLOWUPINSTRUCTIONS_ED_ALL_ED_FT
Fall Prevention in the Home    Falls can cause injuries and can affect people from all age groups. There are many simple things that you can do to make your home safe and to help prevent falls.    WHAT CAN I DO ON THE OUTSIDE OF MY HOME?  Regularly repair the edges of walkways and driveways and fix any cracks.  Remove high doorway thresholds.  Trim any shrubbery on the main path into your home.  Use bright outdoor lighting.  Clear walkways of debris and clutter, including tools and rocks.  Regularly check that handrails are securely fastened and in good repair. Both sides of any steps should have handrails.  Install guardrails along the edges of any raised decks or porches.  Have leaves, snow, and ice cleared regularly.  Use sand or salt on walkways during winter months.  In the garage, clean up any spills right away, including grease or oil spills.    WHAT CAN I DO IN THE BATHROOM?  Use night lights.  Install grab bars by the toilet and in the tub and shower. Do not use towel bars as grab bars.  Use non-skid mats or decals on the floor of the tub or shower.  If you need to sit down while you are in the shower, use a plastic, non-slip stool.  Keep the floor dry. Immediately clean up any water that spills on the floor.  Remove soap buildup in the tub or shower on a regular basis.  Attach bath mats securely with double-sided non-slip rug tape.  Remove throw rugs and other tripping hazards from the floor.    WHAT CAN I DO IN THE BEDROOM?  Use night lights.  Make sure that a bedside light is easy to reach.  Do not use oversized bedding that drapes onto the floor.  Have a firm chair that has side arms to use for getting dressed.  Remove throw rugs and other tripping hazards from the floor.    WHAT CAN I DO IN THE KITCHEN?  Clean up any spills right away.  Avoid walking on wet floors.  Place frequently used items in easy-to-reach places.  If you need to reach for something above you, use a sturdy step stool that has a grab bar.  Keep electrical cables out of the way.  Do not use floor polish or wax that makes floors slippery. If you have to use wax, make sure that it is non-skid floor wax.  Remove throw rugs and other tripping hazards from the floor.    WHAT CAN I DO IN THE STAIRWAYS?  Do not leave any items on the stairs.  Make sure that there are handrails on both sides of the stairs. Fix handrails that are broken or loose. Make sure that handrails are as long as the stairways.  Check any carpeting to make sure that it is firmly attached to the stairs. Fix any carpet that is loose or worn.  Avoid having throw rugs at the top or bottom of stairways, or secure the rugs with carpet tape to prevent them from moving.  Make sure that you have a light switch at the top of the stairs and the bottom of the stairs. If you do not have them, have them installed.    WHAT ARE SOME OTHER FALL PREVENTION TIPS?  Wear closed-toe shoes that fit well and support your feet. Wear shoes that have rubber soles or low heels.  When you use a stepladder, make sure that it is completely opened and that the sides are firmly locked. Have someone hold the ladder while you are using it. Do not climb a closed stepladder.  Add color or contrast paint or tape to grab bars and handrails in your home. Place contrasting color strips on the first and last steps.  Use mobility aids as needed, such as canes, walkers, scooters, and crutches.  Turn on lights if it is dark. Replace any light bulbs that burn out.  Set up furniture so that there are clear paths. Keep the furniture in the same spot.  Fix any uneven floor surfaces.  Choose a carpet design that does not hide the edge of steps of a stairway.  Be aware of any and all pets.  Review your medicines with your healthcare provider. Some medicines can cause dizziness or changes in blood pressure, which increase your risk of falling.     Talk with your health care provider about other ways that you can decrease your risk of falls. This may include working with a physical therapist or  to improve your strength, balance, and endurance.    ADDITIONAL NOTES AND INSTRUCTIONS    Please follow up with your Primary MD in 24-48 hr.  Seek immediate medical care for any new/worsening signs or symptoms.

## 2020-09-08 NOTE — ED PROVIDER NOTE - CLINICAL SUMMARY MEDICAL DECISION MAKING FREE TEXT BOX
74 yr old m who presents s/p fall, yesterday. Complaining of change in vision (pt asymptomatic at presentation). Pt stated that she had nausea and blurry vision after the event, however asymptomatic at presentation. No acute findings on imaging. Pt given a copy of CT, for chronic findings. Pt dced.

## 2020-09-08 NOTE — ED ADULT NURSE NOTE - CHIEF COMPLAINT QUOTE
pt BIBA from [facility] for possible brain bleed pt is on plavix and fell yesterday and struck her head twice  pt having blurry vision and her blood pressure is high

## 2020-09-08 NOTE — ED PROVIDER NOTE - NS ED ROS FT
Eyes:  No visual changes, eye pain or discharge.  ENMT:  No hearing changes, pain, no sore throat or runny nose, no difficulty swallowing  Cardiac:  No chest pain, SOB or edema. No chest pain with exertion.  Respiratory:  No cough or respiratory distress. No hemoptysis. No history of asthma or RAD.  GI:  No nausea, vomiting, diarrhea or abdominal pain.  :  No dysuria, frequency or burning.  MS:  No myalgia, muscle weakness, joint pain or back pain.  Neuro:  Slight headache no  weakness.  No LOC.  Skin:  No skin rash.   Endocrine: No history of thyroid disease or diabetes.

## 2020-09-08 NOTE — ED ADULT TRIAGE NOTE - CHIEF COMPLAINT QUOTE
pt BIBA from [facility] for possible brain bleed pt is on plavix and fell yesterday and struck her head twice  pt having blurry vision and her blood pressure is high

## 2020-09-08 NOTE — ED PROVIDER NOTE - ATTENDING CONTRIBUTION TO CARE
74 yr old f w/ a pmh significant for a fib (on eliquis), CAD s/p stent (on plavix), htn who presents today s/p fall. Pt states that yesterday she was attempting to get on her bed when she fell on her back. Pt states that she then attempted to read (without her glasses) and has not been feeling like herself. However, pt is not symptomatic at this point (pt denies any blurry vision)   Pt denies any change in mental status, nausea, vomiting, fevers, chills or any other complaints.     Review of Systems    Constitutional: (-) fever  Cardiovascular: (-) chest pain, (-) syncope  Respiratory: (-) cough, (-) shortness of breath  Gastrointestinal: (-) vomiting, (-) diarrhea, (-) abdominal pain  Musculoskeletal: (-) neck pain, (-) back pain, (-) joint pain  Integumentary: (-) rash, (-) edema  Neurological: (-) headache, (-) altered mental status    Except as documented in the HPI, all other systems are negative.    VITAL SIGNS: I have reviewed nursing notes and confirm.  CONSTITUTIONAL: non-toxic, well appearing  SKIN: no rash, no petechiae.  EYES: PERRL, EOMI, pink conjunctiva, anicteric  ENT: tongue midline, no exudates, MMM  NECK: Supple; no meningismus, no JVD   CARD: RRR, no murmurs, equal radial pulses bilaterally 2+  RESP: CTAB, no respiratory distress  ABD: Soft, non-tender, non-distended, no peritoneal signs, no HSM, no CVA tenderness  EXT: Normal ROM x4. No edema. No calves tenderness  NEURO: Alert, oriented. CN2-12 intact, equal strength bilaterally, nl gait.  PSYCH: Cooperative, appropriate.    a/p  74 yr old f that presents s/p fall, on anticoagulation   -imaging  labs  -dispo as per above

## 2020-09-08 NOTE — ED PROVIDER NOTE - PROGRESS NOTE DETAILS
pt aaox3, neuro exam benign, gait normal. CT scan read w/ no acute findings. CT scan result explained to pt. Aman: pts AAOX3, not in any distress. Pt informed of CT findings. Pt verbalized understanding. Pt will be discharged with pcp follow up and return precautions.

## 2020-09-08 NOTE — ED PROVIDER NOTE - OBJECTIVE STATEMENT
74 y f, pmh of afib on plavix elliquis and aspirin, htn, cad s/p 4 stent placement, coming in after fall. Pt. fell yesterday when she was trying to get up to her bed. Endorses hitting her head and neck on the left, no LOC. Pt. endorses feeling slight nausea and blurry vision, but denies weakness/numbness/tingling. Denies f/c, cp, sob, abd pain, trauma anywhere else. 74 y f, pmh of afib on plavix elliquis and aspirin, htn, cad s/p 4 stent placement, coming in after fall. Pt. fell yesterday when she was trying to get up to her bed. Endorses hitting her head and neck on the left, no LOC. Pt. endorsed slight blurry vision after the fall but says now her eyes are fine.  Denies weakness/numbness/tingling. Denies f/c, cp, sob, abd pain, trauma anywhere else.

## 2021-03-20 ENCOUNTER — APPOINTMENT (OUTPATIENT)
Age: 76
End: 2021-03-20
Payer: MEDICARE

## 2021-03-20 PROCEDURE — 0001A: CPT

## 2021-04-01 NOTE — H&P ADULT - PROBLEM/PLAN-4
DISPLAY PLAN FREE TEXT Cephalexin Counseling: I counseled the patient regarding use of cephalexin as an antibiotic for prophylactic and/or therapeutic purposes. Cephalexin (commonly prescribed under brand name Keflex) is a cephalosporin antibiotic which is active against numerous classes of bacteria, including most skin bacteria. Side effects may include nausea, diarrhea, gastrointestinal upset, rash, hives, yeast infections, and in rare cases, hepatitis, kidney disease, seizures, fever, confusion, neurologic symptoms, and others. Patients with severe allergies to penicillin medications are cautioned that there is about a 10% incidence of cross-reactivity with cephalosporins. When possible, patients with penicillin allergies should use alternatives to cephalosporins for antibiotic therapy.

## 2021-05-27 ENCOUNTER — APPOINTMENT (OUTPATIENT)
Dept: CARDIOLOGY | Facility: CLINIC | Age: 76
End: 2021-05-27

## 2021-07-19 PROBLEM — Z87.891 FORMER SMOKER: Status: ACTIVE | Noted: 2021-07-19

## 2021-07-21 ENCOUNTER — APPOINTMENT (OUTPATIENT)
Dept: CARDIOLOGY | Facility: CLINIC | Age: 76
End: 2021-07-21
Payer: MEDICARE

## 2021-07-21 VITALS
HEIGHT: 61 IN | WEIGHT: 128 LBS | SYSTOLIC BLOOD PRESSURE: 132 MMHG | DIASTOLIC BLOOD PRESSURE: 62 MMHG | BODY MASS INDEX: 24.17 KG/M2

## 2021-07-21 DIAGNOSIS — R00.2 PALPITATIONS: ICD-10-CM

## 2021-07-21 DIAGNOSIS — Z87.891 PERSONAL HISTORY OF NICOTINE DEPENDENCE: ICD-10-CM

## 2021-07-21 PROCEDURE — 99214 OFFICE O/P EST MOD 30 MIN: CPT

## 2021-07-21 NOTE — PHYSICAL EXAM
[5th Left ICS - MCL] : palpated at the 5th LICS in the midclavicular line [No Precordial Heave] : no precordial heave was noted [Normal Rate] : normal [Rhythm Regular] : regular [Normal S1] : normal S1 [Normal S2] : normal S2 [No Murmur] : no murmurs heard [No Pitting Edema] : no pitting edema present [2+] : left 2+ [No Abnormalities] : the abdominal aorta was not enlarged and no bruit was heard [Normal] : alert and oriented, normal memory [S3] : no S3 [S4] : no S4 [Right Carotid Bruit] : no bruit heard over the right carotid [Left Carotid Bruit] : no bruit heard over the left carotid

## 2021-07-21 NOTE — DISCUSSION/SUMMARY
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 Shots back from pain mgmt in past. Told continue to exercise. Watch diet.    Blood by primary. May need to switch to crestor BP high past. Told stop cold cuts. She presented 8/20 septic urine. Made trop. Stress positive. She got 8/13/20 4 stents RCA [place]. She was getting chest pain prior. She also had episode of  AF. Stop plavix about 8/20/21 and start  ASA.She needs a d and c. Risk is low to intermediate. Can hold eliquis 2 days before procedure . Continue plavix. Reviewed meds with patient. She got covid vacines

## 2021-07-21 NOTE — REASON FOR VISIT
[Arrhythmia/ECG Abnorrmalities] : arrhythmia/ECG abnormalities [Coronary Artery Disease] : coronary artery disease [FreeTextEntry1] : Patient preop. She needs D and c. She denies chest pain or sob. Walk 30 minutes twice a day 5 days a week

## 2021-07-21 NOTE — REVIEW OF SYSTEMS
[Knee Pain] : knee pain [Lower Back Pain] : lower back pain [Fever] : no fever [Blurry Vision] : no blurred vision [Sore Throat] : no sore throat [SOB] : no shortness of breath [Chest Discomfort] : no chest discomfort [Palpitations] : no palpitations [Abdominal Pain] : no abdominal pain [Diarrhea] : diarrhea [Constipation] : no constipation [Dysuria] : no dysuria [Rash] : no rash [Skin Lesions] : no skin lesions [Dizziness] : no dizziness [Weakness] : no weakness [Confusion] : no confusion was observed [Swollen Glands] : no swollen glands [FreeTextEntry2] : neck pain

## 2021-07-25 ENCOUNTER — OUTPATIENT (OUTPATIENT)
Dept: OUTPATIENT SERVICES | Facility: HOSPITAL | Age: 76
LOS: 1 days | Discharge: HOME | End: 2021-07-25

## 2021-07-25 DIAGNOSIS — Z95.1 PRESENCE OF AORTOCORONARY BYPASS GRAFT: Chronic | ICD-10-CM

## 2021-07-25 DIAGNOSIS — Z87.19 PERSONAL HISTORY OF OTHER DISEASES OF THE DIGESTIVE SYSTEM: Chronic | ICD-10-CM

## 2021-07-25 DIAGNOSIS — Z98.890 OTHER SPECIFIED POSTPROCEDURAL STATES: Chronic | ICD-10-CM

## 2021-07-25 DIAGNOSIS — Z11.59 ENCOUNTER FOR SCREENING FOR OTHER VIRAL DISEASES: ICD-10-CM

## 2021-07-25 DIAGNOSIS — Z96.631 PRESENCE OF RIGHT ARTIFICIAL WRIST JOINT: Chronic | ICD-10-CM

## 2021-07-27 NOTE — ASU PATIENT PROFILE, ADULT - PMH
CAD (coronary artery disease)    Chronic midline back pain, unspecified back location    Heart attack    HTN (hypertension)     CAD (coronary artery disease)    Chronic midline back pain, unspecified back location    Fibromyalgia    Heart attack    HTN (hypertension)

## 2021-07-27 NOTE — ASU PATIENT PROFILE, ADULT - NS PRO PASSIVE SMOKE EXP
Pt is a 6 y/o M w/ PMH of asthma p/w abdominal pain and vomiting x1 day. Symptoms started with vomiting once yesterday followed by abdominal pain that started in the epigastric region yesterday and then moved to the RLQ today. Pain is described as 10/10 in severity, constant, worsened with movement. Associated with vomiting one time yesterday and once today and dysuria today. As per mother, pt has had a decreased appetite since yesterday, only having had some crackers and Gatorade. Parents brought pt to pediatrician today and had a recorded fever of 101F and was sent to the hospital. Pt denies diarrhea, constipation, URI symptoms, recent sick contacts, recent travel. No

## 2021-07-28 ENCOUNTER — OUTPATIENT (OUTPATIENT)
Dept: OUTPATIENT SERVICES | Facility: HOSPITAL | Age: 76
LOS: 1 days | Discharge: HOME | End: 2021-07-28
Payer: MEDICARE

## 2021-07-28 ENCOUNTER — RESULT REVIEW (OUTPATIENT)
Age: 76
End: 2021-07-28

## 2021-07-28 VITALS
RESPIRATION RATE: 18 BRPM | HEART RATE: 58 BPM | TEMPERATURE: 97 F | OXYGEN SATURATION: 98 % | SYSTOLIC BLOOD PRESSURE: 144 MMHG | DIASTOLIC BLOOD PRESSURE: 80 MMHG

## 2021-07-28 VITALS
HEART RATE: 60 BPM | RESPIRATION RATE: 20 BRPM | DIASTOLIC BLOOD PRESSURE: 63 MMHG | TEMPERATURE: 98 F | SYSTOLIC BLOOD PRESSURE: 139 MMHG | OXYGEN SATURATION: 98 % | WEIGHT: 128.97 LBS | HEIGHT: 62 IN

## 2021-07-28 DIAGNOSIS — N84.0 POLYP OF CORPUS UTERI: ICD-10-CM

## 2021-07-28 DIAGNOSIS — Z96.631 PRESENCE OF RIGHT ARTIFICIAL WRIST JOINT: Chronic | ICD-10-CM

## 2021-07-28 DIAGNOSIS — R93.89 ABNORMAL FINDINGS ON DIAGNOSTIC IMAGING OF OTHER SPECIFIED BODY STRUCTURES: ICD-10-CM

## 2021-07-28 DIAGNOSIS — Z98.890 OTHER SPECIFIED POSTPROCEDURAL STATES: Chronic | ICD-10-CM

## 2021-07-28 DIAGNOSIS — Z95.1 PRESENCE OF AORTOCORONARY BYPASS GRAFT: Chronic | ICD-10-CM

## 2021-07-28 DIAGNOSIS — Z87.19 PERSONAL HISTORY OF OTHER DISEASES OF THE DIGESTIVE SYSTEM: Chronic | ICD-10-CM

## 2021-07-28 PROCEDURE — 88305 TISSUE EXAM BY PATHOLOGIST: CPT | Mod: 26

## 2021-07-28 RX ORDER — CANDESARTAN CILEXETIL 8 MG/1
1 TABLET ORAL
Qty: 0 | Refills: 0 | DISCHARGE

## 2021-07-28 RX ORDER — SODIUM CHLORIDE 9 MG/ML
1000 INJECTION, SOLUTION INTRAVENOUS
Refills: 0 | Status: DISCONTINUED | OUTPATIENT
Start: 2021-07-28 | End: 2021-08-11

## 2021-07-28 RX ORDER — GABAPENTIN 400 MG/1
0 CAPSULE ORAL
Qty: 0 | Refills: 0 | DISCHARGE

## 2021-07-28 RX ORDER — OXYCODONE AND ACETAMINOPHEN 5; 325 MG/1; MG/1
1 TABLET ORAL EVERY 4 HOURS
Refills: 0 | Status: DISCONTINUED | OUTPATIENT
Start: 2021-07-28 | End: 2021-07-28

## 2021-07-28 RX ORDER — ONDANSETRON 8 MG/1
4 TABLET, FILM COATED ORAL ONCE
Refills: 0 | Status: DISCONTINUED | OUTPATIENT
Start: 2021-07-28 | End: 2021-08-11

## 2021-07-28 RX ADMIN — SODIUM CHLORIDE 100 MILLILITER(S): 9 INJECTION, SOLUTION INTRAVENOUS at 14:00

## 2021-07-28 NOTE — H&P PST ADULT - ASSESSMENT
The patient is a 76 yo here for an elective dilation and curettage with hysteroscopy for endometrial polyps.

## 2021-07-28 NOTE — ASU DISCHARGE PLAN (ADULT/PEDIATRIC) - CARE PROVIDER_API CALL
Nicole Wallace)  Obstetrics and Gynecology  [address] [place] [address]  Phone: [phone]  Fax: [phone]  Follow Up Time:

## 2021-07-28 NOTE — ASU DISCHARGE PLAN (ADULT/PEDIATRIC) - ASU DC SPECIAL INSTRUCTIONSFT
PAIN MANAGEMENT:        - Tylenol – 500-650 mg every 6 hours as needed  o The maximum dose of Tylenol is 3000 mg in 24 hours  A warm shower or heating pad may also help.    WHAT TO EXPECT AT HOME  -  Do not put anything in the vagina for at least 2 weeks after surgery unless otherwise instructed by your doctor (including tampons, douching, sexual intercourse, etc).  - It is normal to have some vaginal bleeding after surgery that would require the use of a pantiliner.     WHEN TO CALL YOUR DOCTOR:  - Fever (>100.4°F or 38.0°C) or chills  - Severe nausea or persistent vomiting.  - Bright red vaginal bleeding (soaking >1 pad/hour) or foul smelling vaginal drainage.  - Severe pain not relieved with pain medication.  - Pain with urination, cloudy urine, or foul smelling urine.  - Or if you have any other problems or questions.

## 2021-07-28 NOTE — BRIEF OPERATIVE NOTE - NSICDXBRIEFPREOP_GEN_ALL_CORE_FT
PRE-OP DIAGNOSIS:  Endometrial polyp 28-Jul-2021 14:02:09  Hailey Perkins  Thickened endometrium 28-Jul-2021 14:02:19  Hailey Perkins

## 2021-07-28 NOTE — H&P PST ADULT - NSICDXPASTMEDICALHX_GEN_ALL_CORE_FT
PAST MEDICAL HISTORY:  CAD (coronary artery disease)     Chronic midline back pain, unspecified back location     Fibromyalgia     Heart attack     HTN (hypertension)

## 2021-07-28 NOTE — BRIEF OPERATIVE NOTE - NSICDXBRIEFPOSTOP_GEN_ALL_CORE_FT
POST-OP DIAGNOSIS:  Endometrial polyp 28-Jul-2021 14:02:32  Hailey Perkins  Thickened endometrium 28-Jul-2021 14:02:38  Hailey Perkins

## 2021-07-28 NOTE — H&P PST ADULT - HISTORY OF PRESENT ILLNESS
The patient is a 74 yo here for an elective dilation and curettage with hysteroscopy for endometrial polyps. She has no complaints today, denies fevers, chills, SOB, chest pain, abdominal pain or heavy vaginal bleeding.

## 2021-07-30 LAB — SURGICAL PATHOLOGY STUDY: SIGNIFICANT CHANGE UP

## 2021-08-03 NOTE — PROGRESS NOTE ADULT - ASSESSMENT
[address]  [] EVALUATION  [x] DAILY NOTE (LAND) [] DAILY NOTE (AQUATIC ) [] PROGRESS NOTE [] DISCHARGE NOTE    [x] OUTPATIENT REHABILITATION [facility]   [] [place] [address]    [] [facility]   [] Anthony Amabil    Date: 8/3/2021  Patient Name:  Holley Mooney  : 1987  MRN: [id number]  CSN: [id number]    Referring Practitioner Karla Vaughn, APR*   Diagnosis Unspecified sprain of right elbow, initial encounter [S53.401A]  Strain of unspecified muscles, fascia and tendons at forearm level, right arm, initial encounter [S56.911A]    Treatment Diagnosis Unspecified sprain of right elbow, initial encounter [S53.401A]  Strain of unspecified muscles, fascia and tendons at forearm level, right arm, initial encounter [C18.321T]   Date of Evaluation 21      Functional Outcome Measure Used Upper Extremity Functional Scale   Functional Outcome Score 53/80 (21)       Insurance: Primary: Payor: OUMAR LEATHA /  /  / ,   Secondary:    Authorization Information: PRE CERTIFICATION REQUIRED: YES, APPROVED FOR PT/OT TOTAL OF 12 VISITS, 21 TO 21  INSURANCE THERAPY BENEFIT:     AQUATIC THERAPY COVERED: NO  MODALITIES COVERED:  YES, IONBENITA HAS BEEN APPROVED    Visit # 5, 5/10 for progress note   Visits Allowed: 12   Recertification Date:    Physician Follow-Up: 7/15/21   Physician Orders: Allegra Pilling and treat   Pertinent History: 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 He reports he felt a pop in his elbow  Patient had MRI at [place] on [facility] [address] to get results. Other PMH includes bipolar and anxiety disorder. SUBJECTIVE: Patient feels the ionto is helping with pain. Has been wearing a Band-It at home and tolerating well. Goes by Drea King.     TREATMENT   Precautions:    Pain:at rest 1-2/10     X in shaded column indicates Activity Completed Today   Modalities Parameters/  Location  Notes/Comments   Iontophoresis with dexamethasone 0.4%, 40 dose, 2.5 mL, 3.5 mA x 12 minutes  X                Manual Therapy Time/  Technique  Notes/Comments   Astym to lateral elbow, forearm extensors, tricep 10 min X                 Exercises   Sets/  Sec Reps  Notes/Comments   AROM elbow flexion and extension 1 10 x    AROM supination/pronation elbow at side 1 10 X    AROM wrist flexion and extension elbow at side 1 10 X    AAROM Supinator wheel for gentle elbow extension; wrist flex/ext 1 10  Gentle     Tricep stretch 15 sec 3 X    Gentle stretching wrist flexion with elbow bent at 90, forearm neutral and pronated 30 sec 3 each position X           Activities Time    Notes/Comments                       Specific Interventions Next Treatment: iontophoresis, IASTM, pain-free ROM    Activity/Treatment Tolerance:  [x]  Patient tolerated treatment well  []  Patient limited by fatigue  []  Patient limited by pain   []  Patient limited by other medical complications  []  Other:     Assessment: Patient is slowly progressing towards his goals. C/o some discomfort at lateral elbow with Astym but otherwise tolerated ok. Areas for Improvement: impaired activity tolerance, impaired ROM, impaired strength and pain  Prognosis: good    GOALS:  Patient Goal: reduce pain    Short Term Goals:  Time Frame: 4 weeks  *  Patient will demonstrate AROM wrist flexion in elbow extended position to 35 degrees to improve soft tissue length for better flexibility for self care. * Patient will report pain with AROM of R arm to no more than 2/10 for improved tolerance for lifting. * Patient will demonstrate I knowledge of HEP for improved flexibility and eventual strength for lifting. Long Term Goals:  Time Frame: 12 weeks  *  Patient will improve UEFS to at least 62. *  Patient will report no more than 2/10 pain with pushing up from a chair.    * Patient will demonstrate at least 30# of  R hand in loaded Patient feels better with K corrected. MI r/o . Daily kcl . Check bmp in one week outpatient. position for improved strength for RTW tasks. Patient Education:   [x]  HEP/Education Completed: Plan of Care, Goals, HEP (see below)    AROM elbow, forearm and wrist in pain-free ROM, tricep stretch and self massage added 7/27  []  No new Education completed  []  Reviewed Prior HEP      [x]  Patient verbalized and/or demonstrated understanding of education provided. []  Patient unable to verbalize and/or demonstrate understanding of education provided. Will continue education. [x]  Barriers to learning: none    PLAN:  Treatment Recommendations: Range of Motion, Manual Therapy - Soft Tissue Mobilization, Manual Therapy - Joint Manipulation, Pain Management, Home Exercise Program, Patient Education, Integrative Dry Needling and Modalities    []  Plan of care initiated. Plan to see patient 2 times per week for 12 weeks to address the treatment planned outlined above.   [x]  Continue with current plan of care  []  Modify plan of care as follows:    []  Hold pending physician visit  []  Discharge    CPT CODE TIME-IN TIME-OUT TOTAL TIME   69488 (Manual) [id number] (Th ex) [id number]   27046 (Ionto)  [id number]         TOTAL SESSION [id number]         Electronically Signed by:  Emma CALLAWAY/DIAMOND, CHT #[id number]

## 2021-08-30 ENCOUNTER — EMERGENCY (EMERGENCY)
Facility: HOSPITAL | Age: 76
LOS: 0 days | Discharge: HOME | End: 2021-08-30
Attending: EMERGENCY MEDICINE | Admitting: EMERGENCY MEDICINE
Payer: MEDICARE

## 2021-08-30 VITALS
DIASTOLIC BLOOD PRESSURE: 72 MMHG | SYSTOLIC BLOOD PRESSURE: 160 MMHG | OXYGEN SATURATION: 98 % | HEART RATE: 95 BPM | TEMPERATURE: 96 F | RESPIRATION RATE: 18 BRPM

## 2021-08-30 VITALS
WEIGHT: 130.07 LBS | OXYGEN SATURATION: 99 % | HEIGHT: 62 IN | HEART RATE: 88 BPM | TEMPERATURE: 98 F | SYSTOLIC BLOOD PRESSURE: 148 MMHG | RESPIRATION RATE: 18 BRPM | DIASTOLIC BLOOD PRESSURE: 67 MMHG

## 2021-08-30 DIAGNOSIS — Z98.890 OTHER SPECIFIED POSTPROCEDURAL STATES: Chronic | ICD-10-CM

## 2021-08-30 DIAGNOSIS — I25.10 ATHEROSCLEROTIC HEART DISEASE OF NATIVE CORONARY ARTERY WITHOUT ANGINA PECTORIS: ICD-10-CM

## 2021-08-30 DIAGNOSIS — Z88.8 ALLERGY STATUS TO OTHER DRUGS, MEDICAMENTS AND BIOLOGICAL SUBSTANCES: ICD-10-CM

## 2021-08-30 DIAGNOSIS — I10 ESSENTIAL (PRIMARY) HYPERTENSION: ICD-10-CM

## 2021-08-30 DIAGNOSIS — Z96.631 PRESENCE OF RIGHT ARTIFICIAL WRIST JOINT: ICD-10-CM

## 2021-08-30 DIAGNOSIS — E78.5 HYPERLIPIDEMIA, UNSPECIFIED: ICD-10-CM

## 2021-08-30 DIAGNOSIS — Z88.0 ALLERGY STATUS TO PENICILLIN: ICD-10-CM

## 2021-08-30 DIAGNOSIS — M54.2 CERVICALGIA: ICD-10-CM

## 2021-08-30 DIAGNOSIS — R00.1 BRADYCARDIA, UNSPECIFIED: ICD-10-CM

## 2021-08-30 DIAGNOSIS — Z90.49 ACQUIRED ABSENCE OF OTHER SPECIFIED PARTS OF DIGESTIVE TRACT: ICD-10-CM

## 2021-08-30 DIAGNOSIS — Z95.5 PRESENCE OF CORONARY ANGIOPLASTY IMPLANT AND GRAFT: ICD-10-CM

## 2021-08-30 DIAGNOSIS — Z95.1 PRESENCE OF AORTOCORONARY BYPASS GRAFT: Chronic | ICD-10-CM

## 2021-08-30 DIAGNOSIS — Z87.19 PERSONAL HISTORY OF OTHER DISEASES OF THE DIGESTIVE SYSTEM: Chronic | ICD-10-CM

## 2021-08-30 DIAGNOSIS — Z95.1 PRESENCE OF AORTOCORONARY BYPASS GRAFT: ICD-10-CM

## 2021-08-30 DIAGNOSIS — Z79.01 LONG TERM (CURRENT) USE OF ANTICOAGULANTS: ICD-10-CM

## 2021-08-30 DIAGNOSIS — Z96.631 PRESENCE OF RIGHT ARTIFICIAL WRIST JOINT: Chronic | ICD-10-CM

## 2021-08-30 DIAGNOSIS — I25.2 OLD MYOCARDIAL INFARCTION: ICD-10-CM

## 2021-08-30 PROBLEM — M79.7 FIBROMYALGIA: Chronic | Status: ACTIVE | Noted: 2021-07-28

## 2021-08-30 PROCEDURE — 93010 ELECTROCARDIOGRAM REPORT: CPT

## 2021-08-30 PROCEDURE — 99284 EMERGENCY DEPT VISIT MOD MDM: CPT

## 2021-08-30 RX ORDER — METHOCARBAMOL 500 MG/1
1500 TABLET, FILM COATED ORAL ONCE
Refills: 0 | Status: COMPLETED | OUTPATIENT
Start: 2021-08-30 | End: 2021-08-30

## 2021-08-30 RX ORDER — METHOCARBAMOL 500 MG/1
1 TABLET, FILM COATED ORAL
Qty: 5 | Refills: 0
Start: 2021-08-30 | End: 2021-09-03

## 2021-08-30 RX ORDER — OXYCODONE AND ACETAMINOPHEN 5; 325 MG/1; MG/1
2 TABLET ORAL ONCE
Refills: 0 | Status: DISCONTINUED | OUTPATIENT
Start: 2021-08-30 | End: 2021-08-30

## 2021-08-30 RX ORDER — KETOROLAC TROMETHAMINE 30 MG/ML
15 SYRINGE (ML) INJECTION ONCE
Refills: 0 | Status: DISCONTINUED | OUTPATIENT
Start: 2021-08-30 | End: 2021-08-30

## 2021-08-30 RX ADMIN — Medication 15 MILLIGRAM(S): at 15:10

## 2021-08-30 RX ADMIN — OXYCODONE AND ACETAMINOPHEN 2 TABLET(S): 5; 325 TABLET ORAL at 15:41

## 2021-08-30 RX ADMIN — METHOCARBAMOL 1500 MILLIGRAM(S): 500 TABLET, FILM COATED ORAL at 15:10

## 2021-08-30 RX ADMIN — Medication 15 MILLIGRAM(S): at 15:42

## 2021-08-30 NOTE — ED PROVIDER NOTE - CLINICAL SUMMARY MEDICAL DECISION MAKING FREE TEXT BOX
patient complains of neck pain that is worse with movement she is having sharp stabbing pain that is intermittent and worse with movement    although she has a history of cardiac disease this presentation is not consistent with an acs picture she has no sob, no fevers no chills no diaphoresis she is complaining of intermittent sharp stabbling neck pain  that is intermittent   in addition she states that it is her typical neck pain character has not changed in any way.  she sees outpatient pain management and has gotten injections for it as an outpatient,  she has an otherwise normal physical exam with no weakness radial pulses 2 +=,   we obtained an ekg which does not reveal stemi. in addition patient has improved with treatment.   this is not consistent with acs. in addition, she has no weakness, or signs of infection. this is most consistent with an exacerbation of her chronic neck pain.

## 2021-08-30 NOTE — ED PROVIDER NOTE - ATTENDING CONTRIBUTION TO CARE
I was present for and supervised the key and critical aspects of the procedures performed during the care of the patient. ATTENDING NOTE: 74 y/o F PMH HTN, fibromyalgia, herniated discs and CAD s/p stents/bypass presents to the ED with worsening neck pain since Friday. Pt states that she sees pain management for injections in her lower back and was trying to get in contact with them for injections in her neck but was unable to be seen in the office. Pt doubled her pain medications as instructed by pain management but states that she has had no improvement in symptoms and cannot wait for an appointment. No recent injuries, trauma or falls. On exam: NCAT. PERRLA, EOMI. OP clear. Neck with no crepitus. (+) L para-lumbar TTP. Lungs CTAB. RRR, S1S2 noted. Radial pulses 2+ and equal, pedal pulses 2+ and equal. Abdomen soft, NT/ND, no rebound or guarding. FROM x4 extremities. No focal neuro deficits. Plan: Pain control, EKG and reassess.

## 2021-08-30 NOTE — ED PROVIDER NOTE - OBJECTIVE STATEMENT
76 yo female, PMHx of MI s/p stents on Eliquis, HTN, HLD, presents with left sided neck pain x1 month, non-radiating, worse in the last few days, sharp, constant, not alleviated by tramadol, no associated symptoms. Denies nausea, vomiting, arm pain/numbness, chest, pain, SOB, abdominal pain, fevers, chills, dizziness. 76 yo female, PMHx of MI s/p stents on Eliquis, HTN, HLD, fibroymalgia, herniated discs, presents with left sided neck pain x1 month, non-radiating, worse in the last few days, sharp, constant, not alleviated by tramadol, no associated symptoms. Denies nausea, vomiting, arm pain/numbness, chest, pain, SOB, abdominal pain, fevers, chills, dizziness.

## 2021-08-30 NOTE — ED PROVIDER NOTE - NS ED ROS FT
GEN:  no fever, no chills, no generalized weakness  NEURO:  no headache, no dizziness  NECK: +left sided neck pain  ENT: no sore throat, no runny nose  CV:  no chest pain, no palpitations  RESP:  no sob, no cough  GI:  no nausea, no vomiting, no abdominal pain  :  no dysuria, no urinary frequency, no hematuria  MSK:  no joint pain, no edema  SKIN:  no rash, no bruising

## 2021-08-30 NOTE — ED PROVIDER NOTE - PATIENT PORTAL LINK FT
You can access the FollowMyHealth Patient Portal offered by [facility] by registering at the following website: [email or web address]. By joining [facility]’s FollowMyHealth portal, you will also be able to view your health information using other applications (apps) compatible with our system.

## 2021-08-30 NOTE — ED PROVIDER NOTE - NSFOLLOWUPINSTRUCTIONS_ED_ALL_ED_FT
Acute Neck Pain    WHAT YOU NEED TO KNOW:    What do I need to know about acute neck pain? Acute neck pain starts suddenly, increases quickly, and goes away in a few days. The pain may come and go, or be worse with certain movements. The pain may be only in your neck, or it may move to your arms, back, or shoulders. You may also have pain that starts in another body area and moves to your neck.   Vertebral Column         What causes or increases my risk for acute neck pain? Acute neck pain is often caused by a muscle strain or ligament sprain. Any of the following can increase your risk for acute neck pain:   •Inflammation of discs in your neck      •A condition that affects neck to arm nerves, such as thoracic outlet syndrome or brachial neuritis       •A trauma or injury to your neck, such as being hit from behind in a car (whiplash) or sleeping in a bad position      •Shingles, or an infection such as meningitis      How is the cause of acute neck pain diagnosed? Your healthcare provider will ask about your symptoms and when they began. Tell him or her if you were recently in an accident or had another injury to your neck. He or she will examine your neck and shoulders. He or she may also have you move your head in certain ways to see if any position causes or relieves the pain.  •Blood tests may be used to measure the amount of inflammation or to check for signs of an infection.      •X-ray or MRI pictures may show a neck injury or medical condition. Do not enter the MRI room with anything metal. Metal can cause serious damage. Tell the healthcare provider if you have any metal in or on your body.      How is acute neck pain treated? Treatment will depend on what is causing your pain.  •Medicines may be prescribed or recommended for pain. You may need medicine to treat nerve pain or to stop muscle spasms. Medicines may also be given to reduce inflammation. Your healthcare provider may inject medicine into a nerve to block pain. Over-the-counter NSAID medicine or acetaminophen may be recommended to help treat minor pain or inflammation.      •Traction is used to relieve pressure from nerves. Your head is gently pulled up and away from your neck. This stretches muscles and ligaments and makes more room for the spine. Your healthcare provider will tell you the kind of traction that will help your neck pain. Do not use traction devices at home unless directed by your healthcare provider.      What can I do to manage or prevent acute neck pain?   •Rest your neck as directed. Do not make sudden movements, such as turning your head quickly. Your healthcare provider may recommend you wear a cervical collar for a short time. The collar will prevent you from moving your head. This will give your neck time to heal if an injury is causing your neck pain. Ask your healthcare provider when you can return to sports or other normal daily activities.  Cervical Collars           •Apply heat as directed. Heat helps relieve pain and swelling. Use a heat wrap, or soak a small towel in warm water. Wring out the extra water. Apply the heat wrap or towel for 20 minutes every hour, or as directed.      •Apply ice as directed. Ice helps relieve pain and swelling, and can help prevent tissue damage. Use an ice pack, or put ice in a bag. Cover the ice pack or back with a towel before you apply it to your neck. Apply the ice pack or ice for 15 minutes every hour, or as directed. Your healthcare provider can tell you how often to apply ice.      •Do neck exercises as directed. Neck exercises help strengthen the muscles and increase range of motion. Your healthcare provider will tell you which exercises are right for you. He or she may give you instructions or recommend that you work with a physical therapist. Your healthcare provider or therapist can make sure you are doing the exercises correctly.      •Maintain good posture. Try to keep your head and shoulders lifted when you sit. If you work in front of a computer, make sure the monitor is at the right level. You should not need to look up down to see the screen. You should also not have to lean forward to be able to read what is on the screen. Make sure your keyboard, mouse, and other computer items are placed where you do not have to extend your shoulder to reach them. Get up often if you work in front of a computer or sit for long periods of time. Stretch or walk around to keep your neck muscles loose.      When should I seek immediate care?   •You have an injury that causes neck pain and shooting pain down your arms or legs.      •Your neck pain suddenly becomes severe.      •You have neck pain along with numbness, tingling, or weakness in your arms or legs.      •You have a stiff neck, a headache, and a fever.      When should I call my doctor?   •You have new or worsening symptoms.      •Your symptoms continue even after treatment.      •You have questions or concerns about your condition or care.      CARE AGREEMENT:    You have the right to help plan your care. Learn about your health condition and how it may be treated. Discuss treatment options with your healthcare providers to decide what care you want to receive. You always have the right to refuse treatment.

## 2021-08-30 NOTE — ED PROVIDER NOTE - NSFOLLOWUPCLINICS_GEN_ALL_ED_FT
[facility] ([facility])  Rehabilitation  [facility] [address] [place] [address]  Phone: [phone]  Fax:   Follow Up Time: 1-3 Days    [facility] ([facility])  Rehabilitation  [address] [place] [id number]  Phone: [phone]  Fax:   Follow Up Time: 1-3 Days

## 2021-08-30 NOTE — ED PROVIDER NOTE - PHYSICAL EXAMINATION
CONSTITUTIONAL: Well-developed; well-nourished; in no acute distress.   SKIN: warm, dry  HEAD: Normocephalic; atraumatic.  EYES: no conjunctival injection.  ENT: No nasal discharge; airway clear.  NECK: Supple; +left sided neck tenderness  CARD: S1, S2 normal; no murmurs, gallops, or rubs. Regular rate and rhythm.   RESP: No wheezes, rales or rhonchi.  ABD: soft ntnd. BS+ in all 4 quadrants.  EXT: Normal ROM.  No clubbing, cyanosis or edema.   NEURO: Alert, oriented, grossly unremarkable.  PSYCH: Cooperative, appropriate.

## 2021-09-02 NOTE — ED PROVIDER NOTE - OBJECTIVE STATEMENT
[Patient] : patient 73 yo female, pmh of htn, presents to ed for htn, cp that started today, midsternal, mild, aching, no radiation, additionally c/o 5 days of left sided ha. denies fever, chills, cp, sob, le swelling, abd pain, nvd. [Grandmother] : grandmother

## 2021-11-11 ENCOUNTER — APPOINTMENT (OUTPATIENT)
Dept: CARDIOLOGY | Facility: CLINIC | Age: 76
End: 2021-11-11
Payer: MEDICARE

## 2021-11-11 PROCEDURE — 93000 ELECTROCARDIOGRAM COMPLETE: CPT

## 2022-01-11 ENCOUNTER — APPOINTMENT (OUTPATIENT)
Dept: CARDIOLOGY | Facility: CLINIC | Age: 77
End: 2022-01-11
Payer: MEDICARE

## 2022-01-11 VITALS
DIASTOLIC BLOOD PRESSURE: 70 MMHG | SYSTOLIC BLOOD PRESSURE: 122 MMHG | HEIGHT: 61 IN | WEIGHT: 132 LBS | BODY MASS INDEX: 24.92 KG/M2

## 2022-01-11 PROCEDURE — 99214 OFFICE O/P EST MOD 30 MIN: CPT

## 2022-01-11 NOTE — PHYSICAL EXAM
[5th Left ICS - MCL] : palpated at the 5th LICS in the midclavicular line [No Precordial Heave] : no precordial heave was noted [Normal Rate] : normal [Rhythm Regular] : regular [Normal S1] : normal S1 [Normal S2] : normal S2 [S3] : no S3 [S4] : no S4 [No Murmur] : no murmurs heard [No Pitting Edema] : no pitting edema present [2+] : left 2+ [Right Carotid Bruit] : no bruit heard over the right carotid [Left Carotid Bruit] : no bruit heard over the left carotid [No Abnormalities] : the abdominal aorta was not enlarged and no bruit was heard [Normal] : alert and oriented, normal memory

## 2022-01-11 NOTE — DISCUSSION/SUMMARY
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 Shots back from pain mgmt in past. Told continue to exercise. Watch diet.    Blood by primary. May need to switch to crestor BP high past. Told stop cold cuts. She presented 8/20 septic urine. Made trop. Stress positive. She got 8/13/20 4 stents RCA [place]. She was getting chest pain prior. She also had episode of  AF. Can stop Plavix begin asa 81 mg.  Continue plavix. Reviewed meds with patient. She got covid vacines. MOHINI vasc 5. Reviewed staying on AC. She gained 4 pounds. SE soon

## 2022-01-11 NOTE — REVIEW OF SYSTEMS
[Fever] : no fever [Blurry Vision] : no blurred vision [Sore Throat] : no sore throat [SOB] : no shortness of breath [Chest Discomfort] : no chest discomfort [Palpitations] : no palpitations [Abdominal Pain] : no abdominal pain [Diarrhea] : diarrhea [Constipation] : no constipation [Dysuria] : no dysuria [Knee Pain] : knee pain [Lower Back Pain] : lower back pain [Rash] : no rash [Skin Lesions] : no skin lesions [Dizziness] : no dizziness [Weakness] : no weakness [Confusion] : no confusion was observed [Swollen Glands] : no swollen glands [FreeTextEntry2] : neck pain

## 2022-01-14 ENCOUNTER — EMERGENCY (EMERGENCY)
Facility: HOSPITAL | Age: 77
LOS: 0 days | Discharge: HOME | End: 2022-01-14
Attending: EMERGENCY MEDICINE | Admitting: EMERGENCY MEDICINE
Payer: MEDICARE

## 2022-01-14 VITALS
WEIGHT: 130.95 LBS | TEMPERATURE: 97 F | HEIGHT: 62 IN | DIASTOLIC BLOOD PRESSURE: 72 MMHG | RESPIRATION RATE: 18 BRPM | SYSTOLIC BLOOD PRESSURE: 170 MMHG | OXYGEN SATURATION: 95 % | HEART RATE: 70 BPM

## 2022-01-14 VITALS
OXYGEN SATURATION: 98 % | TEMPERATURE: 97 F | SYSTOLIC BLOOD PRESSURE: 132 MMHG | DIASTOLIC BLOOD PRESSURE: 74 MMHG | RESPIRATION RATE: 18 BRPM

## 2022-01-14 DIAGNOSIS — Z96.631 PRESENCE OF RIGHT ARTIFICIAL WRIST JOINT: Chronic | ICD-10-CM

## 2022-01-14 DIAGNOSIS — R10.9 UNSPECIFIED ABDOMINAL PAIN: ICD-10-CM

## 2022-01-14 DIAGNOSIS — M79.7 FIBROMYALGIA: ICD-10-CM

## 2022-01-14 DIAGNOSIS — Z87.19 PERSONAL HISTORY OF OTHER DISEASES OF THE DIGESTIVE SYSTEM: Chronic | ICD-10-CM

## 2022-01-14 DIAGNOSIS — I25.2 OLD MYOCARDIAL INFARCTION: ICD-10-CM

## 2022-01-14 DIAGNOSIS — G89.29 OTHER CHRONIC PAIN: ICD-10-CM

## 2022-01-14 DIAGNOSIS — M54.9 DORSALGIA, UNSPECIFIED: ICD-10-CM

## 2022-01-14 DIAGNOSIS — Z88.1 ALLERGY STATUS TO OTHER ANTIBIOTIC AGENTS STATUS: ICD-10-CM

## 2022-01-14 DIAGNOSIS — Z98.890 OTHER SPECIFIED POSTPROCEDURAL STATES: Chronic | ICD-10-CM

## 2022-01-14 DIAGNOSIS — Z95.1 PRESENCE OF AORTOCORONARY BYPASS GRAFT: Chronic | ICD-10-CM

## 2022-01-14 DIAGNOSIS — Z88.0 ALLERGY STATUS TO PENICILLIN: ICD-10-CM

## 2022-01-14 DIAGNOSIS — Z95.1 PRESENCE OF AORTOCORONARY BYPASS GRAFT: ICD-10-CM

## 2022-01-14 DIAGNOSIS — Z20.822 CONTACT WITH AND (SUSPECTED) EXPOSURE TO COVID-19: ICD-10-CM

## 2022-01-14 DIAGNOSIS — Z95.5 PRESENCE OF CORONARY ANGIOPLASTY IMPLANT AND GRAFT: ICD-10-CM

## 2022-01-14 DIAGNOSIS — K59.00 CONSTIPATION, UNSPECIFIED: ICD-10-CM

## 2022-01-14 DIAGNOSIS — I25.10 ATHEROSCLEROTIC HEART DISEASE OF NATIVE CORONARY ARTERY WITHOUT ANGINA PECTORIS: ICD-10-CM

## 2022-01-14 DIAGNOSIS — I10 ESSENTIAL (PRIMARY) HYPERTENSION: ICD-10-CM

## 2022-01-14 DIAGNOSIS — Z90.49 ACQUIRED ABSENCE OF OTHER SPECIFIED PARTS OF DIGESTIVE TRACT: ICD-10-CM

## 2022-01-14 DIAGNOSIS — Z88.8 ALLERGY STATUS TO OTHER DRUGS, MEDICAMENTS AND BIOLOGICAL SUBSTANCES: ICD-10-CM

## 2022-01-14 DIAGNOSIS — R11.0 NAUSEA: ICD-10-CM

## 2022-01-14 LAB
ALBUMIN SERPL ELPH-MCNC: 4.7 G/DL — SIGNIFICANT CHANGE UP (ref 3.5–5.2)
ALP SERPL-CCNC: 79 U/L — SIGNIFICANT CHANGE UP (ref 30–115)
ALT FLD-CCNC: 26 U/L — SIGNIFICANT CHANGE UP (ref 0–41)
ANION GAP SERPL CALC-SCNC: 14 MMOL/L — SIGNIFICANT CHANGE UP (ref 7–14)
APPEARANCE UR: CLEAR — SIGNIFICANT CHANGE UP
AST SERPL-CCNC: 29 U/L — SIGNIFICANT CHANGE UP (ref 0–41)
BASOPHILS # BLD AUTO: 0.05 K/UL — SIGNIFICANT CHANGE UP (ref 0–0.2)
BASOPHILS NFR BLD AUTO: 0.6 % — SIGNIFICANT CHANGE UP (ref 0–1)
BILIRUB SERPL-MCNC: 0.6 MG/DL — SIGNIFICANT CHANGE UP (ref 0.2–1.2)
BILIRUB UR-MCNC: NEGATIVE — SIGNIFICANT CHANGE UP
BUN SERPL-MCNC: 32 MG/DL — HIGH (ref 10–20)
CALCIUM SERPL-MCNC: 10.2 MG/DL — HIGH (ref 8.5–10.1)
CHLORIDE SERPL-SCNC: 93 MMOL/L — LOW (ref 98–110)
CO2 SERPL-SCNC: 33 MMOL/L — HIGH (ref 17–32)
COLOR SPEC: YELLOW — SIGNIFICANT CHANGE UP
CREAT SERPL-MCNC: 1.5 MG/DL — SIGNIFICANT CHANGE UP (ref 0.7–1.5)
DIFF PNL FLD: NEGATIVE — SIGNIFICANT CHANGE UP
EOSINOPHIL # BLD AUTO: 0.09 K/UL — SIGNIFICANT CHANGE UP (ref 0–0.7)
EOSINOPHIL NFR BLD AUTO: 1.1 % — SIGNIFICANT CHANGE UP (ref 0–8)
GLUCOSE SERPL-MCNC: 112 MG/DL — HIGH (ref 70–99)
GLUCOSE UR QL: NEGATIVE MG/DL — SIGNIFICANT CHANGE UP
HCT VFR BLD CALC: 41.4 % — SIGNIFICANT CHANGE UP (ref 37–47)
HGB BLD-MCNC: 14.3 G/DL — SIGNIFICANT CHANGE UP (ref 12–16)
IMM GRANULOCYTES NFR BLD AUTO: 0.2 % — SIGNIFICANT CHANGE UP (ref 0.1–0.3)
KETONES UR-MCNC: NEGATIVE — SIGNIFICANT CHANGE UP
LACTATE SERPL-SCNC: 1.1 MMOL/L — SIGNIFICANT CHANGE UP (ref 0.7–2)
LEUKOCYTE ESTERASE UR-ACNC: NEGATIVE — SIGNIFICANT CHANGE UP
LIDOCAIN IGE QN: 37 U/L — SIGNIFICANT CHANGE UP (ref 7–60)
LYMPHOCYTES # BLD AUTO: 1.55 K/UL — SIGNIFICANT CHANGE UP (ref 1.2–3.4)
LYMPHOCYTES # BLD AUTO: 19.3 % — LOW (ref 20.5–51.1)
MCHC RBC-ENTMCNC: 29.4 PG — SIGNIFICANT CHANGE UP (ref 27–31)
MCHC RBC-ENTMCNC: 34.5 G/DL — SIGNIFICANT CHANGE UP (ref 32–37)
MCV RBC AUTO: 85.2 FL — SIGNIFICANT CHANGE UP (ref 81–99)
MONOCYTES # BLD AUTO: 0.65 K/UL — HIGH (ref 0.1–0.6)
MONOCYTES NFR BLD AUTO: 8.1 % — SIGNIFICANT CHANGE UP (ref 1.7–9.3)
NEUTROPHILS # BLD AUTO: 5.66 K/UL — SIGNIFICANT CHANGE UP (ref 1.4–6.5)
NEUTROPHILS NFR BLD AUTO: 70.7 % — SIGNIFICANT CHANGE UP (ref 42.2–75.2)
NITRITE UR-MCNC: NEGATIVE — SIGNIFICANT CHANGE UP
NRBC # BLD: 0 /100 WBCS — SIGNIFICANT CHANGE UP (ref 0–0)
PH UR: 7 — SIGNIFICANT CHANGE UP (ref 5–8)
PLATELET # BLD AUTO: 270 K/UL — SIGNIFICANT CHANGE UP (ref 130–400)
POTASSIUM SERPL-MCNC: 3.4 MMOL/L — LOW (ref 3.5–5)
POTASSIUM SERPL-SCNC: 3.4 MMOL/L — LOW (ref 3.5–5)
PROT SERPL-MCNC: 8 G/DL — SIGNIFICANT CHANGE UP (ref 6–8)
PROT UR-MCNC: NEGATIVE MG/DL — SIGNIFICANT CHANGE UP
RBC # BLD: 4.86 M/UL — SIGNIFICANT CHANGE UP (ref 4.2–5.4)
RBC # FLD: 12.3 % — SIGNIFICANT CHANGE UP (ref 11.5–14.5)
SARS-COV-2 RNA SPEC QL NAA+PROBE: SIGNIFICANT CHANGE UP
SODIUM SERPL-SCNC: 140 MMOL/L — SIGNIFICANT CHANGE UP (ref 135–146)
SP GR SPEC: 1.01 — SIGNIFICANT CHANGE UP (ref 1.01–1.03)
TROPONIN T SERPL-MCNC: <0.01 NG/ML — SIGNIFICANT CHANGE UP
TROPONIN T SERPL-MCNC: <0.01 NG/ML — SIGNIFICANT CHANGE UP
UROBILINOGEN FLD QL: 0.2 MG/DL — SIGNIFICANT CHANGE UP
WBC # BLD: 8.02 K/UL — SIGNIFICANT CHANGE UP (ref 4.8–10.8)
WBC # FLD AUTO: 8.02 K/UL — SIGNIFICANT CHANGE UP (ref 4.8–10.8)

## 2022-01-14 PROCEDURE — 93010 ELECTROCARDIOGRAM REPORT: CPT | Mod: 76

## 2022-01-14 PROCEDURE — 74174 CTA ABD&PLVS W/CONTRAST: CPT | Mod: 26,MA

## 2022-01-14 PROCEDURE — 99285 EMERGENCY DEPT VISIT HI MDM: CPT

## 2022-01-14 PROCEDURE — 71275 CT ANGIOGRAPHY CHEST: CPT | Mod: 26,MA

## 2022-01-14 RX ORDER — SODIUM CHLORIDE 9 MG/ML
1000 INJECTION INTRAMUSCULAR; INTRAVENOUS; SUBCUTANEOUS ONCE
Refills: 0 | Status: COMPLETED | OUTPATIENT
Start: 2022-01-14 | End: 2022-01-14

## 2022-01-14 RX ORDER — MORPHINE SULFATE 50 MG/1
6 CAPSULE, EXTENDED RELEASE ORAL ONCE
Refills: 0 | Status: DISCONTINUED | OUTPATIENT
Start: 2022-01-14 | End: 2022-01-14

## 2022-01-14 RX ORDER — ONDANSETRON 8 MG/1
4 TABLET, FILM COATED ORAL ONCE
Refills: 0 | Status: COMPLETED | OUTPATIENT
Start: 2022-01-14 | End: 2022-01-14

## 2022-01-14 RX ADMIN — ONDANSETRON 4 MILLIGRAM(S): 8 TABLET, FILM COATED ORAL at 13:01

## 2022-01-14 RX ADMIN — SODIUM CHLORIDE 1000 MILLILITER(S): 9 INJECTION INTRAMUSCULAR; INTRAVENOUS; SUBCUTANEOUS at 13:06

## 2022-01-14 RX ADMIN — Medication 20 MILLIGRAM(S): at 14:16

## 2022-01-14 RX ADMIN — MORPHINE SULFATE 6 MILLIGRAM(S): 50 CAPSULE, EXTENDED RELEASE ORAL at 13:10

## 2022-01-14 NOTE — ED PROVIDER NOTE - OBJECTIVE STATEMENT
75 yo F hx of HTN, MI with stents, fibromyalgia c/o right flank pains x 3 days. Pain is constant, severe, and worse with movement. +nausea and constipation. No f/c/n/v. No hematuria or dysuria.

## 2022-01-14 NOTE — ED ADULT NURSE NOTE - NSHOSCREENINGQ1_ED_ALL_ED
Patient ID: Kimberly Tadeo is a 65 year old female.    Chief Complaint   Patient presents with   • Ankle Injury     Right ankle injury while Shoveling snow 2 days ago       New/Established? : Established Patient    Accompanied by:Unaccompanied today    HPI  Right ankle injury while shoveling snow on 1/19/19.  Inversion injury.  Minimal pain, had some bruising initially which went away w/ ice and application of topical Arnica.  Has been taking OTC ibuprofen which helps w/ pain, as well as ice and elevation, but has not been wrapping ankle.  Able to bear weight.  No numbness.       Ankle Pain    The incident occurred 2 days ago. The incident occurred at home. The injury mechanism was an inversion injury. The pain is present in the right ankle. The quality of the pain is described as aching. The pain is mild. The pain has been intermittent since onset. Pertinent negatives include no inability to bear weight, loss of motion, loss of sensation, numbness or tingling. Exacerbated by: walking down stairs only. She has tried ice, elevation, NSAIDs and non-weight bearing for the symptoms. The treatment provided moderate relief.       Review of Systems   Constitutional: Negative for activity change, chills, fatigue and fever.   Respiratory: Negative.    Cardiovascular: Negative.    Musculoskeletal: Positive for arthralgias and joint swelling. Negative for back pain, gait problem, myalgias and neck stiffness.   Skin: Negative for color change, rash and wound.   Neurological: Negative for tingling, tremors, weakness and numbness.       I have reviewed the past medical history, family history, social history, medications and allergies listed in the medical record as obtained by my nursing staff and support staff and agree with their documentation.    No past medical history on file.  No past surgical history on file.  No family history on file.  Social History     Tobacco Use   • Smoking status: Never Smoker   • Smokeless  tobacco: Never Used   Substance Use Topics   • Alcohol use: No     Frequency: Never   • Drug use: No        No current outpatient medications on file.     No current facility-administered medications for this visit.      ALLERGIES:  No Known Allergies        Visit Vitals  /72 (BP [place] [facility] [place] Position: Sitting)   Pulse 68   Temp 96.8 °F (36 °C)   Resp 16   Wt 94 kg (207 lb 3.7 oz)   SpO2 95%   BMI 33.45 kg/m²     Physical Exam   Constitutional: She is oriented to person, place, and time. She appears well-developed and well-nourished. No distress.   Cardiovascular: Intact distal pulses.   Musculoskeletal: Normal range of motion.        Right knee: Normal.        Right ankle: She exhibits swelling (Mild, laterally). She exhibits normal range of motion, no deformity, no laceration and normal pulse. Tenderness (ATFL, proximal 5th MT). AITFL and head of 5th metatarsal tenderness found. No lateral malleolus, no medial malleolus, no CF ligament, no posterior TFL and no proximal fibula tenderness found. Achilles tendon exhibits no pain and no defect.        Right foot: Normal.   Neurological: She is alert and oriented to person, place, and time. No sensory deficit.   Skin: She is not diaphoretic.   Nursing note and vitals reviewed.      Results    X-ray of ankle: no fx at fibular, fifth MT; there is an abnormality of the dorsal navicular on lateral view which looks likely to be old; looks like anatomic variant vs old partial, incomplete avulsion fx of site.     No visits with results within 1 Day(s) from this visit.   Latest known visit with results is:   Lab Services on 01/10/2018   Component Date Value Ref Range Status   • COLOR 01/10/2018 yellow    Final   • APPEARANCE 01/10/2018 clean catch    Final   • GLUCOSE U 01/10/2018 n    Final   • BILIRUBIN 01/10/2018 small    Final   • KETONES 01/10/2018 trace    Final   • URINE SPEC GRAVITY 01/10/2018 >=1.030    Final   • OCCULT BLOOD 01/10/2018 n    Final   •  PH 01/10/2018 5.0    Final   • PROTEIN 01/10/2018 n    Final   • UROBILINOGEN 01/10/2018 0.2    Final   • NITRITE 01/10/2018 n    Final   • LEUKOCYTE ESTERASE 01/10/2018 n    Final           ASSESSMENT:  Problem List Items Addressed This Visit     None      Visit Diagnoses     Sprain of anterior talofibular ligament of right ankle, initial encounter    -  Primary    Relevant Orders    XR ANKLE MIN 3 VIEWS RIGHT            PLAN:    Orders Placed This Encounter   • XR ANKLE MIN 3 VIEWS RIGHT   Ace bandage placed; pt given copy of x-ray, has a podiatrist, will f/u there.  Discussed RICE tx; weight bearing as tolerated.  Take your prescribed pain medication (or an over the counter pain medication) as directed and as needed.  You can discontinue your medication once your pain is manageable.    Remember RICE treatment: rest, ice, compression, elevation:    Rest the painful body part until pain is manageable; do not over exert or push yourself.    Ice the painful area throughout the day as needed, 10 minutes on, 10 minutes off.  An easy way to make a cool compress is ice cubes in a plastic sandwich bag, wrapped with a towel.  Do not apply the ice directly to your skin; use a barrier such as clothing or a towel.      Compress the area with an ace bandage or other splint or athletic wrap.  Not too tight, just snug enough to feel supportive!    Elevate the affected body part if you can; this will decrease pain and swelling.    D/C instructions per AVS, reviewed with patient.    Will call pt w/ final x-ray report.    Roldan Harper PA-C      Thank you for visiting [facility].    Does the Patient have a PCP? Yes  To establish care with an [facility] Primary [facility] Provider, please call [phone] ([phone]).   No

## 2022-01-14 NOTE — ED PROVIDER NOTE - NS ED ROS FT
Review of Systems    Constitutional: (-) fever, (-) chills  Eyes/ENT: (-) blurry vision, (-) epistaxis, (-) sore throat  Cardiovascular: (-) chest pain, (-) syncope  Respiratory: (-) cough, (-) shortness of breath  Gastrointestinal: (+) pain, (+) nausea, (-) vomiting, (-) diarrhea, (+) constipation  Musculoskeletal: (-) neck pain, (-) back pain, (-) body aches  Integumentary: (-) rash, (-) edema  Neurological: (-) headache, (-) altered mental status  Psychiatric: (-) hallucinations  Allergic/Immunologic: (-) pruritus

## 2022-01-14 NOTE — ED PROVIDER NOTE - PROGRESS NOTE DETAILS
Patient has developed chest pain at which point we obtained troponin and have switched the ct abd pelvis to dissection study considering new findings patient improved at this time she is able to ambulate well.  repeat exam is normal I will discharge. patient asking for pain medication to go home with instructed to treat medication like  "alcohol" no driving, no operating machinery no taking care of small children particularly advised to take caution when ambulating to the bathroom at night. patient demonstrates understanding

## 2022-01-14 NOTE — ED PROVIDER NOTE - PATIENT PORTAL LINK FT
You can access the FollowMyHealth Patient Portal offered by [facility] by registering at the following website: [email or web address]. By joining [facility]’s FollowMyHealth portal, you will also be able to view your health information using other applications (apps) compatible with our system.

## 2022-01-14 NOTE — ED PROVIDER NOTE - ATTENDING CONTRIBUTION TO CARE
I was present for and supervised the key and critical aspects of the procedures performed during the care of the patient. Patient presents for evaluation of left flank pain that is moderate in nature worse over the past several days She has never had this in the past she has no injury no falls, she denies any dysuria hesitancy or frequency, she denies any initial chest pain, no sob, no other pain noted   on exam she is nc/at perrla eomi oropharynx clear cta b/l, rrr s1s2 noted abd-soft ext from with no focal deficits she has ttp in the right flank with cva tenderness   we obtained labs ua which is negative and ct scan patient sent in for evaluation of kidney stones but she has no history which makes it less likely.  she denies any fevers or chills. she denies any vomiting she has no focal deficits chest started developing cp which led us to change the ct order to dissection protocol which is negative for any acute abnormalities she has nodules found on ct.  overall she improved with treatment her main concern now is re-development of pain. patient also has no rashes noted.  we have instructed patient to return for further evaluation and instructed her to have a low threshold for return to the ED.  she understands I will discharge at this time

## 2022-01-14 NOTE — ED PROVIDER NOTE - CLINICAL SUMMARY MEDICAL DECISION MAKING FREE TEXT BOX
Patient presents for evaluation of left flank pain that is moderate in nature worse over the past several days we obtained labs ua which is negative and ct scan patient sent in for evaluation of kidney stones but she has no history which makes it less likely.  she denies any fevers or chills. she denies any vomiting she has no focal deficits chest started developing cp which led us to change the ct order to dissection protocol which is negative for any acute abnormalities she has nodules found on ct.  overall she improved with treatment her main concern now is re-development of pain. patient also has no rashes noted.  we have instructed patient to return for further evaluation and instructed her to have a low threshold for return to the ED.  she understands I will discharge at this time

## 2022-01-14 NOTE — ED PROVIDER NOTE - PHYSICAL EXAMINATION
Gen: Alert, NAD, well appearing  Head: NC, AT, PERRL, EOMI, normal lids/conjunctiva  ENT: normal hearing  Neck: +supple, no tenderness/meningismus,  Pulm: Bilateral BS, normal resp effort, no wheeze/stridor/retractions  CV: RRR  Abd: soft, +right flank tenderness and pain with movement  Mskel: no edema/erythema/cyanosis  Skin: no rash, warm/dry  Neuro: AAOx3, no sensory/motor deficits

## 2022-01-15 RX ORDER — OXYCODONE AND ACETAMINOPHEN 5; 325 MG/1; MG/1
1 TABLET ORAL
Qty: 5 | Refills: 0
Start: 2022-01-15

## 2022-03-14 NOTE — ASU PREOP CHECKLIST - BP NONINVASIVE SYSTOLIC (MM HG)
RESPIRATORY NOTE:        Pt is back on full ventilatory support per MD, will evaluate again tomorrow, will continue to monitor. 139

## 2022-04-26 ENCOUNTER — APPOINTMENT (OUTPATIENT)
Dept: CARDIOLOGY | Facility: CLINIC | Age: 77
End: 2022-04-26

## 2022-05-24 ENCOUNTER — RX RENEWAL (OUTPATIENT)
Age: 77
End: 2022-05-24

## 2022-05-25 NOTE — ED ADULT TRIAGE NOTE - HEIGHT IN FEET
5 Odomzo Counseling- I discussed with the patient the risks of Odomzo including but not limited to nausea, vomiting, diarrhea, constipation, weight loss, changes in the sense of taste, decreased appetite, muscle spasms, and hair loss.  The patient verbalized understanding of the proper use and possible adverse effects of Odomzo.  All of the patient's questions and concerns were addressed.

## 2022-06-09 ENCOUNTER — RESULT CHARGE (OUTPATIENT)
Age: 77
End: 2022-06-09

## 2022-06-09 ENCOUNTER — APPOINTMENT (OUTPATIENT)
Dept: CARDIOLOGY | Facility: CLINIC | Age: 77
End: 2022-06-09
Payer: MEDICARE

## 2022-06-09 VITALS
WEIGHT: 125 LBS | SYSTOLIC BLOOD PRESSURE: 130 MMHG | BODY MASS INDEX: 23.6 KG/M2 | HEIGHT: 61 IN | DIASTOLIC BLOOD PRESSURE: 76 MMHG

## 2022-06-09 DIAGNOSIS — Z00.00 ENCOUNTER FOR GENERAL ADULT MEDICAL EXAMINATION W/OUT ABNORMAL FINDINGS: ICD-10-CM

## 2022-06-09 DIAGNOSIS — I25.2 OLD MYOCARDIAL INFARCTION: ICD-10-CM

## 2022-06-09 PROCEDURE — 93000 ELECTROCARDIOGRAM COMPLETE: CPT

## 2022-06-09 PROCEDURE — 99214 OFFICE O/P EST MOD 30 MIN: CPT

## 2022-06-09 RX ORDER — PANTOPRAZOLE 40 MG/1
40 TABLET, DELAYED RELEASE ORAL
Refills: 0 | Status: DISCONTINUED | COMMUNITY
End: 2022-06-09

## 2022-06-09 RX ORDER — NEOMYCIN SULFATE, POLYMYXIN B SULFATE AND DEXAMETHASONE 3.5; 10000; 1 MG/ML; [USP'U]/ML; MG/ML
3.5-10000-0.1 SUSPENSION OPHTHALMIC
Qty: 5 | Refills: 0 | Status: DISCONTINUED | COMMUNITY
Start: 2021-09-13 | End: 2022-06-09

## 2022-06-09 RX ORDER — METHYLPREDNISOLONE 4 MG/1
4 TABLET ORAL
Qty: 21 | Refills: 0 | Status: DISCONTINUED | COMMUNITY
Start: 2021-08-17 | End: 2022-06-09

## 2022-06-09 RX ORDER — ASPIRIN 81 MG/1
81 TABLET, DELAYED RELEASE ORAL DAILY
Refills: 0 | Status: ACTIVE | COMMUNITY
Start: 2022-06-09

## 2022-06-09 RX ORDER — NAPROXEN 500 MG/1
500 TABLET ORAL
Qty: 14 | Refills: 0 | Status: DISCONTINUED | COMMUNITY
Start: 2021-08-30 | End: 2022-06-09

## 2022-06-09 RX ORDER — NEOMYCIN AND POLYMYXIN B SULFATES AND DEXAMETHASONE 3.5; 10000; 1 MG/G; [IU]/G; MG/G
3.5-10000-0.1 OINTMENT OPHTHALMIC
Qty: 4 | Refills: 0 | Status: DISCONTINUED | COMMUNITY
Start: 2021-11-09 | End: 2022-06-09

## 2022-06-09 NOTE — DISCUSSION/SUMMARY
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   8/20 4 stents RCA Dr Daniels, HLD,HTN, PAF MOHINI vasc 5 ,  DM, lumbar disc disease for clearance and AC management for epidural injections by Pain management\par Pt here for pre op clearance but has had chest pressure with and without exertion lasting 1 min several times associated with dizziness and slight ataxia. No diaphoresis. No palpitations. She is not sure if its coming from her back. The symptoms seem to come with increased frequency. In view of her chest pain we will defer the upcoming planned MBB with sedation L3-S1 by Dr Bush until after she obtains a Modified  stress echo  test. Told pt to hold metoprolol 24 hrs prior to test. Instructed pt to go to ER for persistent pain. Pt sleeps poorly not sure if snores will refer to pulm for sleep eval.

## 2022-06-09 NOTE — HISTORY OF PRESENT ILLNESS
[Preoperative Visit] : for a medical evaluation prior to surgery [Scheduled Procedure ___] : a [unfilled] [Surgeon Name ___] : surgeon: [unfilled] [Good] : Good [Diabetes] : diabetes [Cardiovascular Disease] : cardiovascular disease [Prior Anesthesia] : Prior anesthesia [Chest Pain] : chest pain [Urinary Frequency] : urinary frequency [Dyspnea] : no dyspnea [Prev Anesthesia Reaction] : no previous anesthesia reaction [Anesthesia Reaction] : no anesthesia reaction [Sudden Death] : no sudden death [de-identified] : + [FreeTextEntry1] : Pt here for pre op clearance but has had chest pressure with and without exertion lasting 1 min several times associated with dizziness and slight ataxia. No diaphoresis. No palpitations. She is not sure if its coming from her back. The symptoms seem to come with increased frequency

## 2022-06-09 NOTE — PHYSICAL EXAM
[General Appearance - Well Developed] : well developed [Normal Appearance] : normal appearance [Normal Conjunctiva] : the conjunctiva exhibited no abnormalities [Normal Oral Mucosa] : normal oral mucosa [No Oral Pallor] : no oral pallor [No Oral Cyanosis] : no oral cyanosis [JVD Elevated _____cm] : JVD elevated [unfilled] ~U cm above clavicle [Normal Jugular Venous V Waves Present] : normal jugular venous V waves present [Respiration, Rhythm And Depth] : normal respiratory rhythm and effort [Exaggerated Use Of Accessory Muscles For Inspiration] : no accessory muscle use [Auscultation Breath Sounds / Voice Sounds] : lungs were clear to auscultation bilaterally [Normal Rate] : normal [Rhythm Regular] : regular [Normal S1] : normal S1 [Normal S2] : normal S2 [No Murmur] : no murmurs heard [2+] : left 2+ [No Abnormalities] : the abdominal aorta was not enlarged and no bruit was heard [No Pitting Edema] : no pitting edema present [Abdomen Soft] : soft [Abdomen Tenderness] : non-tender [Abdomen Mass (___ Cm)] : no abdominal mass palpated [Abnormal Walk] : normal gait [Gait - Sufficient For Exercise Testing] : the gait was sufficient for exercise testing [Nail Clubbing] : no clubbing of the fingernails [Cyanosis, Localized] : no localized cyanosis [Petechial Hemorrhages (___cm)] : no petechial hemorrhages [Skin Color & Pigmentation] : normal skin color and pigmentation [] : no rash [No Venous Stasis] : no venous stasis [Skin Lesions] : no skin lesions [No Skin Ulcers] : no skin ulcer [No Xanthoma] : no  xanthoma was observed [Oriented To Time, Place, And Person] : oriented to person, place, and time [Affect] : the affect was normal [Mood] : the mood was normal [No Anxiety] : not feeling anxious [S3] : no S3 [S4] : no S4 [Right Carotid Bruit] : no bruit heard over the right carotid [Left Carotid Bruit] : no bruit heard over the left carotid [Right Femoral Bruit] : no bruit heard over the right femoral artery [Left Femoral Bruit] : no bruit heard over the left femoral artery [FreeTextEntry1] : pt walks several times a week

## 2022-06-09 NOTE — REVIEW OF SYSTEMS
[Chest Discomfort] : chest discomfort [Blood In The Urine] : hematuria [Joint Pain] : joint pain [Negative] : Heme/Lymph [Fever] : no fever [Chills] : no chills [Blurry Vision] : no blurred vision [Earache] : no earache [SOB] : no shortness of breath [Lower Ext Edema] : no extremity edema [Palpitations] : no palpitations [Cough] : no cough [Wheezing] : no wheezing [Abdominal Pain] : no abdominal pain [Constipation] : no constipation [Rash] : no rash [Skin Lesions] : no skin lesions [Dizziness] : no dizziness [Weakness] : no weakness [Confusion] : no confusion was observed [FreeTextEntry8] : will be seeing urology

## 2022-06-27 ENCOUNTER — RX RENEWAL (OUTPATIENT)
Age: 77
End: 2022-06-27

## 2022-07-26 ENCOUNTER — OUTPATIENT (OUTPATIENT)
Dept: OUTPATIENT SERVICES | Facility: HOSPITAL | Age: 77
LOS: 1 days | Discharge: HOME | End: 2022-07-26

## 2022-07-26 DIAGNOSIS — Z98.890 OTHER SPECIFIED POSTPROCEDURAL STATES: Chronic | ICD-10-CM

## 2022-07-26 DIAGNOSIS — Z96.631 PRESENCE OF RIGHT ARTIFICIAL WRIST JOINT: Chronic | ICD-10-CM

## 2022-07-26 DIAGNOSIS — Z00.00 ENCOUNTER FOR GENERAL ADULT MEDICAL EXAMINATION WITHOUT ABNORMAL FINDINGS: ICD-10-CM

## 2022-07-26 DIAGNOSIS — Z95.1 PRESENCE OF AORTOCORONARY BYPASS GRAFT: Chronic | ICD-10-CM

## 2022-07-26 DIAGNOSIS — Z87.19 PERSONAL HISTORY OF OTHER DISEASES OF THE DIGESTIVE SYSTEM: Chronic | ICD-10-CM

## 2022-07-26 DIAGNOSIS — G47.00 INSOMNIA, UNSPECIFIED: ICD-10-CM

## 2022-07-26 PROCEDURE — 93351 STRESS TTE COMPLETE: CPT | Mod: 26

## 2022-07-29 ENCOUNTER — OUTPATIENT (OUTPATIENT)
Dept: OUTPATIENT SERVICES | Facility: HOSPITAL | Age: 77
LOS: 1 days | Discharge: HOME | End: 2022-07-29

## 2022-07-29 DIAGNOSIS — Z87.19 PERSONAL HISTORY OF OTHER DISEASES OF THE DIGESTIVE SYSTEM: Chronic | ICD-10-CM

## 2022-07-29 DIAGNOSIS — Z96.631 PRESENCE OF RIGHT ARTIFICIAL WRIST JOINT: Chronic | ICD-10-CM

## 2022-07-29 DIAGNOSIS — Z98.890 OTHER SPECIFIED POSTPROCEDURAL STATES: Chronic | ICD-10-CM

## 2022-07-29 DIAGNOSIS — Z95.1 PRESENCE OF AORTOCORONARY BYPASS GRAFT: Chronic | ICD-10-CM

## 2022-07-29 DIAGNOSIS — N28.9 DISORDER OF KIDNEY AND URETER, UNSPECIFIED: ICD-10-CM

## 2022-07-29 PROCEDURE — 74170 CT ABD WO CNTRST FLWD CNTRST: CPT | Mod: 26,MH

## 2022-08-19 ENCOUNTER — RESULT REVIEW (OUTPATIENT)
Age: 77
End: 2022-08-19

## 2022-08-19 ENCOUNTER — OUTPATIENT (OUTPATIENT)
Dept: OUTPATIENT SERVICES | Facility: HOSPITAL | Age: 77
LOS: 1 days | Discharge: HOME | End: 2022-08-19

## 2022-08-19 DIAGNOSIS — Z87.19 PERSONAL HISTORY OF OTHER DISEASES OF THE DIGESTIVE SYSTEM: Chronic | ICD-10-CM

## 2022-08-19 DIAGNOSIS — I48.0 PAROXYSMAL ATRIAL FIBRILLATION: ICD-10-CM

## 2022-08-19 DIAGNOSIS — Z95.1 PRESENCE OF AORTOCORONARY BYPASS GRAFT: Chronic | ICD-10-CM

## 2022-08-19 DIAGNOSIS — Z96.631 PRESENCE OF RIGHT ARTIFICIAL WRIST JOINT: Chronic | ICD-10-CM

## 2022-08-19 DIAGNOSIS — E11.9 TYPE 2 DIABETES MELLITUS WITHOUT COMPLICATIONS: ICD-10-CM

## 2022-08-19 DIAGNOSIS — Z98.890 OTHER SPECIFIED POSTPROCEDURAL STATES: Chronic | ICD-10-CM

## 2022-08-19 DIAGNOSIS — I10 ESSENTIAL (PRIMARY) HYPERTENSION: ICD-10-CM

## 2022-08-19 DIAGNOSIS — I25.10 ATHEROSCLEROTIC HEART DISEASE OF NATIVE CORONARY ARTERY WITHOUT ANGINA PECTORIS: ICD-10-CM

## 2022-08-19 PROCEDURE — 78452 HT MUSCLE IMAGE SPECT MULT: CPT | Mod: 26,MH

## 2022-09-14 ENCOUNTER — APPOINTMENT (OUTPATIENT)
Dept: CARDIOLOGY | Facility: CLINIC | Age: 77
End: 2022-09-14

## 2022-09-14 VITALS
WEIGHT: 125 LBS | SYSTOLIC BLOOD PRESSURE: 110 MMHG | DIASTOLIC BLOOD PRESSURE: 60 MMHG | HEIGHT: 61 IN | BODY MASS INDEX: 23.6 KG/M2

## 2022-09-14 VITALS — HEART RATE: 89 BPM | OXYGEN SATURATION: 98 %

## 2022-09-14 DIAGNOSIS — G47.00 INSOMNIA, UNSPECIFIED: ICD-10-CM

## 2022-09-14 PROCEDURE — 99214 OFFICE O/P EST MOD 30 MIN: CPT

## 2022-09-14 RX ORDER — CLINDAMYCIN HYDROCHLORIDE 150 MG/1
150 CAPSULE ORAL
Qty: 21 | Refills: 0 | Status: DISCONTINUED | COMMUNITY
Start: 2021-11-30 | End: 2022-09-14

## 2022-09-14 RX ORDER — GLIPIZIDE 5 MG/1
5 TABLET, FILM COATED, EXTENDED RELEASE ORAL
Refills: 0 | Status: ACTIVE | COMMUNITY

## 2022-09-14 NOTE — DISCUSSION/SUMMARY
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   8/20 4 stents RCA Dr Daniels, HLD,HTN, PAF MOHINI vasc 5 ,  DM, lumbar disc disease for clearance and AC management for epidural injections by Pain management\par Pt was for pre op clearance but has had chest pressure with and without exertion lasting 1 min several times associated with dizziness and slight ataxia. No diaphoresis. No palpitations. She is not sure if its coming from her back.Pt had equivocal Stress echo 7/22 had chest pain. Lexiscan 8/22 partlal reperfusion inferolat ischemia  area of prior infarct. Dr Marte reviewed and will continue medical management on BB ARB ASA, CCB.  Instructed pt to go to ER for persistent pain. told pt to inform us of increased frequency of pain. Aware we would consider cardiac cath if pain increases .Consider adding Imdur. Pt does not want to take another med for now.  Pt sleeps poorly not sure if snores ,will refer to pulm for sleep eval. f/u 3 mths

## 2022-09-14 NOTE — REVIEW OF SYSTEMS
[Chest Discomfort] : chest discomfort [Blood In The Urine] : hematuria [Joint Pain] : joint pain [Negative] : Heme/Lymph [Fever] : no fever [Chills] : no chills [Blurry Vision] : no blurred vision [Earache] : no earache [SOB] : no shortness of breath [Lower Ext Edema] : no extremity edema [Palpitations] : no palpitations [Cough] : no cough [Wheezing] : no wheezing [Abdominal Pain] : no abdominal pain [Constipation] : no constipation [Rash] : no rash [Skin Lesions] : no skin lesions [Dizziness] : no dizziness [Weakness] : no weakness [Confusion] : no confusion was observed [FreeTextEntry2] : Sleeps poorly, intermittently  says she snores. Feels tired [FreeTextEntry8] : will be seeing urology

## 2022-09-14 NOTE — HISTORY OF PRESENT ILLNESS
[Preoperative Visit] : for a medical evaluation prior to surgery [Scheduled Procedure ___] : a [unfilled] [Surgeon Name ___] : surgeon: [unfilled] [Good] : Good [Chest Pain] : chest pain [Urinary Frequency] : urinary frequency [Diabetes] : diabetes [Cardiovascular Disease] : cardiovascular disease [Prior Anesthesia] : Prior anesthesia [FreeTextEntry1] : Pt with  PMH of CABG 12/05 s/p urosepsis +troponin, + Stress test, presented w chest pain .   8/20 4 stents RCA Dr Daniels, HLD,HTN, PAF MOHINI vasc 5  on eliquis,  DM, lumbar disc disease . She had equivocal SE 7/22 echo with EF 45-50% grade 1 diastolic dysfunction and lexiscan 8/22 partial reperfusion inferolat segment mild ischemia  area of prior infarct Dr Marte reviewed and will continue medical management on BB ARB ASA, CCB. Pt still has chest tightness  with and without exertion lasting less than 1 min possibly once/wk.  She walks every day without any symptoms. She is followed byDr Jacob PTH elevated. Denies dizziness palpitations syncope or near syncope or SOB [Dyspnea] : no dyspnea [Prev Anesthesia Reaction] : no previous anesthesia reaction [Anesthesia Reaction] : no anesthesia reaction [Sudden Death] : no sudden death

## 2022-09-26 ENCOUNTER — APPOINTMENT (OUTPATIENT)
Dept: ORTHOPEDIC SURGERY | Facility: CLINIC | Age: 77
End: 2022-09-26

## 2022-09-26 VITALS — BODY MASS INDEX: 23.92 KG/M2 | HEIGHT: 62 IN | WEIGHT: 130 LBS

## 2022-09-26 PROCEDURE — 99203 OFFICE O/P NEW LOW 30 MIN: CPT | Mod: 25

## 2022-09-26 PROCEDURE — 73562 X-RAY EXAM OF KNEE 3: CPT | Mod: RT

## 2022-09-26 PROCEDURE — 20610 DRAIN/INJ JOINT/BURSA W/O US: CPT | Mod: RT

## 2022-09-26 NOTE — HISTORY OF PRESENT ILLNESS
[de-identified] : Patient is a 76-year-old female here for evaluation of right knee.  Patient states she has been told in the past that she has arthritis in the right knee.  Patient states over the past week or so she has noticed worsening pain over the right knee without any injury or trauma.  She states she has trouble standing for long periods of time, walking long distances.  Patient denies instability.

## 2022-09-26 NOTE — DISCUSSION/SUMMARY
[de-identified] :  discussed x-rays with patient showing mild osteoarthritis.  Patient is having a flare up of osteoarthritis right knee.  There is a opacity on x-ray, discussed with patient is from surgery on leg  For her surgery.  Discussed treatment options at this time putting rest, Tylenol, range-of-motion exercises, physical therapy, cortisone injection.  Patient agreed to cortisone injection.  Patient is a diabetic, states that this morning her blood glucose level was 114.\par \par Dexamethasone and Lidocaine  \par \par Anesthesia: ethyl chloride sprayed topically.. Dexamethasone 20mg/5mL 2 cc.  \par \par Lidocaine 1%: 2 cc.  \par \par   \par \par Medication was injected in the right knee after verbal consent using sterile preparation and technique. The risks, benefits, and alternatives to cortisone injection were explained in full to the patient. Risks outlined include but are not limited to infection, sepsis, bleeding, scarring, skin discoloration, temporary increase in pain, syncopal episode, failure to resolve symptoms, allergic reaction, symptom recurrence, and elevation of blood sugar in diabetics. Patient understood the risks. All questions were answered. After discussion of options, patient requested an injection. Oral informed consent was obtained and sterile prep was done of the injection site. Sterile technique was utilized for the procedure including the preparation of the solutions used for the injection. Patient tolerated the procedure well. Advised to ice the injection site this evening. \par \par   Follow-up in 6-8 weeks for re-evaluation.  Call if any questions or concerns.  Patient understands agrees with plan seen under supervision of Dr. Segura\par \par

## 2022-09-26 NOTE — DATA REVIEWED
[Right] : of the right [Knee] : knee [FreeTextEntry1] :  x-ray: no acute fracture, subluxation, dislocation.  Mild osteoarthritis with degenerative changes

## 2022-09-26 NOTE — PHYSICAL EXAM
[Right] : right knee [Negative] : negative Elizabeth's [] : non-antalgic [FreeTextEntry9] :   Full flexion-extension with mild pain to medial joint line [de-identified] :  good strength throughout

## 2022-09-28 ENCOUNTER — LABORATORY RESULT (OUTPATIENT)
Age: 77
End: 2022-09-28

## 2022-09-28 ENCOUNTER — APPOINTMENT (OUTPATIENT)
Dept: PAIN MANAGEMENT | Facility: CLINIC | Age: 77
End: 2022-09-28

## 2022-09-28 VITALS — SYSTOLIC BLOOD PRESSURE: 173 MMHG | HEART RATE: 57 BPM | DIASTOLIC BLOOD PRESSURE: 82 MMHG

## 2022-09-28 DIAGNOSIS — M47.818 SPONDYLOSIS W/OUT MYELOPATHY OR RADICULOPATHY, SACRAL AND SACROCOCCYGEAL REGION: ICD-10-CM

## 2022-09-28 LAB — AMPHET UR-MCNC: NEGATIVE

## 2022-09-28 PROCEDURE — 80305 DRUG TEST PRSMV DIR OPT OBS: CPT | Mod: QW

## 2022-09-28 PROCEDURE — 99213 OFFICE O/P EST LOW 20 MIN: CPT

## 2022-09-28 NOTE — PHYSICAL EXAM
[de-identified] : Neck: Palpation of the cervical spine is as follows: right paracervical spasm and right paracervical tenderness. Range of motion of the cervical spine is as follows: Pain at extremes of extension.   Back, including spine: Palpation of the thoracic/lumbar spine is as follows: bilateral lumbar paraspinal tenderness. Range of motion of the thoracic and lumbar spine is as follows: stiffness at extremes extentions, stiffness with bending to the right and stiffness with bending to the left. Lumbar ROM restricted.  Special testing of the thoracic and lumbar spine is as follows: Positive rodriguez maneuver/facet loading bilaterally.

## 2022-09-28 NOTE — DATA REVIEWED
[FreeTextEntry1] : 03/01/22 EMG UE – shows evidence of compression of both median nerves at the wrist, consistent with bilateral carpal tunnel syndrome.  02/22/22 EMG LE – Normal  MRI of the cervical spine 11/30/2020: Diffuse degenerative cervical disc change/disc protrusions. C5-C6 cervical canal narrowing without cord flattening. Multilevel foraminal narrowing greatest, right C5-6 level.  CT of the lumbar spine 06/18/2020: Status post L1-2 L2 through L5-S1 discography. Minimal mid, lower lumbar degenerative disc change. Mid, lower lumbar bilateral foraminal disc extension. Lower lumbar degenerative facet disease. Left L2-L3 settle posterior disc protrusion with left L2-L3 foraminal disc extension/posterior annular tear which almost abuts the left L2 nerve root. No significant spinal canal or foraminal stenosis. He has large correlate with those noted on the prior exam considering modality differences.  MRI of the thoracic spine 12/03/2018: Midthoracic degenerative disc change. Right posterolateral T8-T9 disc herniation with right anterolateral cord invagination. Chronic superior T11 endplate depression. Patent thoracic canal and neural foramina. No change. Question of a subcentimeter medial right upper left nodule. No abnormalities seen at this side on the prior chest CT 2/17. Finding possibly artifactual. Follow-up noncontrast chest CT may confirm.  MRI of the lumbar spine 09/02/2017: Minimal mid lumbar dextroscoliosis. Minimal mid lumbar degenerative disc change. No marian disc herniation, spinal canal or foraminal stenosis. No significant change.

## 2022-09-28 NOTE — DISCUSSION/SUMMARY
[de-identified] : I have consulted the  registry for the purpose of reviewing the patient's controlled substance.\par \par Overall there is at least a 30-50% reduction in pain with the prescribed analgesics. The patient denies any adverse side effects due to the medication (sleeping disturbance, constipation, sleepiness, hallucinations and/or urination problems). \par \par Urine drug screening collected today with rapid sample result consistent with given regimen. Sample to be sent for confirmatory testing with additional relief at a later time.\par \par The patient will return to the office in 4 weeks time and is aware to contact me with any question or concerns in the interim.\par \par Ching Hernandez PA-C\par Luis Bush MD\par

## 2022-09-28 NOTE — ASSESSMENT
[FreeTextEntry1] : 76-year-old female with a history of neck and lower back pain, which has been stable for her. She wishes to continue with medication management. Today, she states she wants to call the office for additional refills of her Tramadol due not being able to come into the office on a monthly basis due to high copays and many other doctor's visits. I advised her that she must be seen monthly for her refills. She stated that she would "stretch" her medication out and make it last so that she can be seen in 6 weeks instead of 4.

## 2022-09-28 NOTE — HISTORY OF PRESENT ILLNESS
[FreeTextEntry1] : HISTORY OF PRESENT ILLNESS: Ms. Huggins is a 76 year old female status post Bilateral L4-5 Transforaminal Epidural Steroid Injection done on 5-. She states that her radicular symptoms are well controlled. Today, she states her pain is mainly axial. She states that this pain is present all throughout the day, being a 9/10 on the pain scale. She states that this pain is located in her lumbar spine, centered very low. She describes this pain as sharp and pressure like. She states that this pain gets worse in the evening. She states that due to this pain she is unable to perform household chores.  Of note, She states that her thoracic and cervical pain and under control. 3. \par \par TODAY: She presents for a revisit encounter. She continues with neck and lower back pain. She underwent a right sacroiliac joint RFA on 3- with approximately 75%. On her last visit in May, a right lumbar MBB was discussed to help her with her lumbar pain; however, she was unable to undergo the injection due to needing cardiac clearance. Today, she states her pain has been stable. She has many medical illnesses which she is addressing at this time and wishes to continue with her regimen of gabapentin / Tramadol. She has been taking Tramadol on a daily basis. She also takes Robaxin sparingly.\par \par UDS: repeated today, 9/28/22 - see separate note.

## 2022-10-03 ENCOUNTER — APPOINTMENT (OUTPATIENT)
Dept: PULMONOLOGY | Facility: CLINIC | Age: 77
End: 2022-10-03

## 2022-10-22 NOTE — ED ADULT TRIAGE NOTE - NS ED NURSE DIRECT TO ROOM YN
· POD#3 for left hand I&D  · Continue with IV zosyn per ID recommendation; wound cultures show A hydrophila, E cloacae, and C sporogenes  · Local wound care per orthopedics Yes

## 2022-11-16 ENCOUNTER — APPOINTMENT (OUTPATIENT)
Dept: PAIN MANAGEMENT | Facility: CLINIC | Age: 77
End: 2022-11-16

## 2022-11-28 ENCOUNTER — APPOINTMENT (OUTPATIENT)
Dept: PAIN MANAGEMENT | Facility: CLINIC | Age: 77
End: 2022-11-28

## 2022-11-28 VITALS
SYSTOLIC BLOOD PRESSURE: 121 MMHG | HEART RATE: 73 BPM | HEIGHT: 62 IN | BODY MASS INDEX: 23.92 KG/M2 | DIASTOLIC BLOOD PRESSURE: 67 MMHG | WEIGHT: 130 LBS

## 2022-11-28 PROCEDURE — 99214 OFFICE O/P EST MOD 30 MIN: CPT

## 2022-11-28 NOTE — ASSESSMENT
[FreeTextEntry1] : 76-year-old female with a flare up of neck and lower back pain. As for her neck pain, she recently underwent a new MRI of the cervical spine. Patient is presenting with radicular pain with impairment in ADLs and functionality.  The pain has not responded to conservative care, including medications, stretching, as well as active modalities, such as physical therapy.  Imaging studies as well as physical exam findings corroborate the symptomatology and radicular pain.  We will proceed with an epidural at this point. I will proceed with a cervical epidural steroid injection with sedation. As for her lower back pain, her last MRI was dated 2017. I will work up her pain with a new MRI of the lumbar spine. I will also have her receive a Lumbar LSO brace. For pain control, I will prescribe a medrol dos fermín. Follow up in 6 weeks will be made for reassessment.\par \par Patient had a MRI that shows a radicular component along with pain referred into the upper extremity. Patient has trialed rehab (Home exercise, physical therapy or chiropractic care) and medications. I will schedule a C7-T1 cervical epidural steroid injection.\par \par Risk, benefits, pros and cons of procedure were explained to the patient using models and diagrams and their questions were answered. \par \par \par The patient has severe anxiety of procedures that necessitates monitored anesthesia care (MAC). The procedure performed will be close to major nerves, arteries, and spinal cord and/or joint structures. Due to the proximity of these structures, we need the patient to be still during the procedure.  With the help of MAC, this will be safely achieved and decrease the risk of any complications.\par \par Patient has pain in spinal movement along with weakness in extension and flexion. I will put in for a lumbar brace with lateral supports to be worn no more than 4 hours a day and 2 hours in a row to decrease facet and disc load, to decrease pain, increase activity, increase function, to reduce pain by restricting mobility of the trunk, to facilitate healing of the spine and related Soft tissues and to support weak spinal muscles used to help the patient with rehab and home exercise program stressing walking.  Other exercises discussed include swimming, elliptical , recumbent bike, Dagoberto chi and Yoga. Use things that heat like hot shower or icy heat before rehab and exercising and at the beginning of the day, and ice (ice in a bag never directly on the skin) after activity and at the end of the day.\par \par Entered by Emilie Benitez, acting as scribe for Dr. Bush.\par  \par The documentation recorded by the scribe, in my presence, accurately reflects the service I personally performed, and the decisions made by me with my edits as appropriate.\par  \par Best Regards, \par Luis Bush MD \par Board Certified, Anesthesiology \par Board Certified, Pain Medicine\par \par

## 2022-11-28 NOTE — DATA REVIEWED
[FreeTextEntry1] : 03/01/22 EMG UE – shows evidence of compression of both median nerves at the wrist, consistent with bilateral carpal tunnel syndrome.  02/22/22 EMG LE – Normal\par \par   MRI of the cervical spine 11/30/2020: Diffuse degenerative cervical disc change/disc protrusions. C5-C6 cervical canal narrowing without cord flattening. Multilevel foraminal narrowing greatest, right C5-6 level.  CT of the lumbar spine 06/18/2020: Status post L1-2 L2 through L5-S1 discography. Minimal mid, lower lumbar degenerative disc change. Mid, lower lumbar bilateral foraminal disc extension. Lower lumbar degenerative facet disease. Left L2-L3 settle posterior disc protrusion with left L2-L3 foraminal disc extension/posterior annular tear which almost abuts the left L2 nerve root. No significant spinal canal or foraminal stenosis. He has large correlate with those noted on the prior exam considering modality differences.  MRI of the thoracic spine 12/03/2018: Midthoracic degenerative disc change. Right posterolateral T8-T9 disc herniation with right anterolateral cord invagination. Chronic superior T11 endplate depression. Patent thoracic canal and neural foramina. No change. Question of a subcentimeter medial right upper left nodule. No abnormalities seen at this side on the prior chest CT 2/17. Finding possibly artifactual. Follow-up noncontrast chest CT may confirm.\par \par   MRI of the lumbar spine 09/02/2017: Minimal mid lumbar dextroscoliosis. Minimal mid lumbar degenerative disc change. No marian disc herniation, spinal canal or foraminal stenosis. No significant change.

## 2022-11-28 NOTE — PHYSICAL EXAM
[de-identified] : NECK: tenderness into the cervical paraspinals. ROM restricted. Pain with flexion. Positive spurling bilaterally.\par \par LOW BACK: tenderness into the lumbar paraspinals. ROM restricted. Pain with flexion. Positive SLR bilaterally.

## 2022-11-28 NOTE — HISTORY OF PRESENT ILLNESS
[FreeTextEntry1] : HISTORY OF PRESENT ILLNESS: Ms. Huggins is a 76 year old female status post Bilateral L4-5 Transforaminal Epidural Steroid Injection done on 5-. She states that her radicular symptoms are well controlled. Today, she states her pain is mainly axial. She states that this pain is present all throughout the day, being a 9/10 on the pain scale. She states that this pain is located in her lumbar spine, centered very low. She describes this pain as sharp and pressure like. She states that this pain gets worse in the evening. She states that due to this pain she is unable to perform household chores.  Of note, She states that her thoracic and cervical pain and under control. 3. \par \par TODAY: She presents for a revisit encounter. Since she was last seen she has had a severe flare up of her neck and lower back pain. She denies any injuries or inciting event. She states the flare up of pain started on Saturday. \par \par As for her neck pain, she states the pain is in her cervical spine with radiation into the upper extremities. She notes associated numbness, tingling and spasms in the hands and arms. She states the pain is worse with grasping objects. She currently rates her pain at a 8/10 on the pain scale. She states the pain is present on a constant basis. \par \par As for her lower back pain, she states the pain is in the lower lumbar spine and radiates into the lower extremities. She states the pain shoots into her legs with numbness, tingling and spasms. She also notes axial lower back pain. She states the pain is worse with standing or walking secondary to the pain. She currently rates the pain at a 8/10 on the pain scale. She states the pain is present on a constant basis.

## 2022-12-08 ENCOUNTER — APPOINTMENT (OUTPATIENT)
Dept: PAIN MANAGEMENT | Facility: CLINIC | Age: 77
End: 2022-12-08
Payer: MEDICARE

## 2022-12-08 PROCEDURE — 93040 RHYTHM ECG WITH REPORT: CPT | Mod: 59

## 2022-12-08 PROCEDURE — 99152Z: CUSTOM

## 2022-12-08 PROCEDURE — 62321 NJX INTERLAMINAR CRV/THRC: CPT

## 2022-12-08 PROCEDURE — 93770 DETERMINATION VENOUS PRESS: CPT | Mod: 59

## 2022-12-08 PROCEDURE — 72040 X-RAY EXAM NECK SPINE 2-3 VW: CPT

## 2022-12-08 NOTE — PROCEDURE
[FreeTextEntry1] : CERVICAL EPIDURAL STEROID INJECTION [FreeTextEntry3] : Date:  2022\par \par Patient: MALIHA LOZA\par \par :  1945\par \par \par \par \par \par Preoperative Diagnosis: Cervical radiculopathy\par Postoperative Diagnosis: Cervical radiculopathy\par \par \par Procedure:\par 1. Interlaminar C7-T1 Cervical Epidural Injection under fluoroscopy\par 2. Epidurography\par 3. Fluoroscopic guidance and localization of needle\par \par \par Physician: Luis Bush M.D. \par \par Anesthesia: See nurses notes, IV sedation, Versed 2mg, Fentanyl 100 mcg\par \par \par Medical Necessity:  Failure of conservative management.\par Indication for Fluoroscopy:  This procedure requires the precise placement of the spinal needle into the epidural space.  It is the only way to accurately and safely perform the injection.\par Consent:  Though unusual, the possible complications including infection, bleeding, nerve damage, hospital admission, stroke, pneumothorax, death or failure of the procedure are theoretically possible. The patient was educated about the of the procedure and alternative therapies. All questions were answered and the patient freely gave consent to proceed.\par The patient was also told that sometimes patients will notice upper and/or lower extremity weakness immediately following the procedure due to extravasation of local anesthetic solution onto the main nerve root during the procedure. In addition, the patient was informed of other possible complications such as phrenic nerve injury, weak/heavy head, or muscle injury.  Lastly, the patient was informed that 1 to 2 weeks of perioperative discomfort following the procedure is to be expected.\par \par \par Monitoring:  Patient had continuous blood pressure, EKG, and pulse oximetry throughout the case. See nurse's notes.\par \par \par \par PROCEDURE NOTE: After obtaining written consent, the patient was positioned prone on the operating table. The back to her neck and upper thorax was prepped with Betadine and draped in usual sterile fashion.  A time out was performed.  The C7-T1 interspace was identified using fluoroscopy. The skin was infiltrated with lidocaine 2% -- 1 cc for subcutaneous analgesia.  The epidural space was identified using a 18g touhy needle with a midline approach using a loss of resistance technique. 2cc omnipaque was used to define the space. A solution of 5 ml of preservative-free sterile saline and 1ml of dexamethasone 10mg, 1cc was infused with minimal pressure on the syringe into the epidural space.  The needle tract and tubing were cleared with 2ml of preservative free normal saline. The procedure was tolerated well. There was no evidence of CSF, Paresthesias nor heme. \par \par \par Epidurogram: Distal and proximal spread was noted.\par Findings: Cervical Spine AP and oblique views with x-ray degenerative changes noted.\par \par Complications: none. \par \par Disposition: I have examined the patient and there are no new physical findings since original presentation. The patient was discharged home with a . The discharge instruction sheet was given to the patient. Motor function was intact.\par \par \par \par Comment: 2nd  RADHA today, depending on effectiveness would schedule 3rd  RADHA in 1-2 weeks vs caudal epidural steroid vs follow up in office. Call if any problems\par \par \par \par \par \par Luis Bush MD \par Board Certified, Anesthesiology \par Board Certified, Pain Medicine \par \par

## 2022-12-14 ENCOUNTER — APPOINTMENT (OUTPATIENT)
Dept: PAIN MANAGEMENT | Facility: CLINIC | Age: 77
End: 2022-12-14

## 2022-12-14 VITALS — HEIGHT: 62 IN | BODY MASS INDEX: 23.92 KG/M2 | WEIGHT: 130 LBS

## 2022-12-14 PROCEDURE — 99214 OFFICE O/P EST MOD 30 MIN: CPT

## 2022-12-14 NOTE — HISTORY OF PRESENT ILLNESS
[FreeTextEntry1] : HISTORY OF PRESENT ILLNESS: Ms. Huggins is a 76 year old female status post Bilateral L4-5 Transforaminal Epidural Steroid Injection done on 5-. She states that her radicular symptoms are well controlled. Today, she states her pain is mainly axial. She states that this pain is present all throughout the day, being a 9/10 on the pain scale. She states that this pain is located in her lumbar spine, centered very low. She describes this pain as sharp and pressure like. She states that this pain gets worse in the evening. She states that due to this pain she is unable to perform household chores.  Of note, She states that her thoracic and cervical pain and under control. 3. \par \par TODAY: She continues with neck and back pain. \par \par As for her neck pain, she underwent a cervical epidural steroid injection on 12-8-2022 with approximately 50% relief of her cervical pain. She states she did have relief for 1 week but now that pain has returned. She continues with ongoing severe pain into the upper extremities. She continues with numbness and tingling into the arms. She states there is also stiffness and spasms into the neck. She currently rates her pain at a 8/10 on the pain scale. She states the pain is constant in nature and present daily. \par \par As for her lower back pain, she states the pain is in the lower lumbar spine and radiates into the lower extremities. She states the pain shoots into her legs with numbness, tingling and spasms. She also notes axial lower back pain. She states the pain is worse with standing or walking secondary to the pain. She currently rates the pain at a 8/10 on the pain scale. She states the pain is present on a constant basis.

## 2022-12-14 NOTE — PHYSICAL EXAM
[de-identified] : NECK: tenderness into the cervical paraspinals. ROM restricted. Pain with flexion. Positive spurling bilaterally.\par \par LOW BACK: tenderness into the lumbar paraspinals. ROM restricted. Pain with flexion. Positive SLR bilaterally.

## 2022-12-14 NOTE — DATA REVIEWED
[FreeTextEntry1] : MRI of the lumbar spine taken on 12/07/2022 showed mild lumbar degenerative disc/facet change.  No marian lumbar disc herniation, spinal canal foraminal stenosis.  Minimal fluid/cyst information posterior to the right L4-5 facet joint on a degenerative basis a new finding.  Increase in size of a probable left renal cyst.\par \par 03/01/22 EMG UE – shows evidence of compression of both median nerves at the wrist, consistent with bilateral carpal tunnel syndrome.  02/22/22 EMG LE – Normal\par \par   MRI of the cervical spine 11/30/2020: Diffuse degenerative cervical disc change/disc protrusions. C5-C6 cervical canal narrowing without cord flattening. Multilevel foraminal narrowing greatest, right C5-6 level.  CT of the lumbar spine 06/18/2020: Status post L1-2 L2 through L5-S1 discography. Minimal mid, lower lumbar degenerative disc change. Mid, lower lumbar bilateral foraminal disc extension. Lower lumbar degenerative facet disease. Left L2-L3 settle posterior disc protrusion with left L2-L3 foraminal disc extension/posterior annular tear which almost abuts the left L2 nerve root. No significant spinal canal or foraminal stenosis. He has large correlate with those noted on the prior exam considering modality differences.  MRI of the thoracic spine 12/03/2018: Midthoracic degenerative disc change. Right posterolateral T8-T9 disc herniation with right anterolateral cord invagination. Chronic superior T11 endplate depression. Patent thoracic canal and neural foramina. No change. Question of a subcentimeter medial right upper left nodule. No abnormalities seen at this side on the prior chest CT 2/17. Finding possibly artifactual. Follow-up noncontrast chest CT may confirm.\par \par   MRI of the lumbar spine 09/02/2017: Minimal mid lumbar dextroscoliosis. Minimal mid lumbar degenerative disc change. No marian disc herniation, spinal canal or foraminal stenosis. No significant change.

## 2022-12-14 NOTE — ASSESSMENT
[FreeTextEntry1] : 76-year-old female with ongoing severe neck and back pain. Her lower back pain is the most bothersome. Patient is presenting with radicular pain with impairment in ADLs and functionality.  The pain has not responded to conservative care, including medications, stretching, as well as active modalities, such as physical therapy.  Imaging studies as well as physical exam findings corroborate the symptomatology and radicular pain.  We will proceed with an epidural at this point. I will proceed with a bilateral L4-5 transforaminal epidural steroid injection with sedation. She is on Eliquis. We will facilitate with her doctor for clearance in order for her to undergo this procedure. We will target the neck again once her lower back pain has returned. Until she undergoes her injection, I will prescribe her a Medrol dos fermín. Her MRI of the lumbar spine did show a kidney cyst for which she is seeing a Nephrologist in the coming months. Follow up in 6 weeks will be made for reassessment.\par \par Bilateral L4-5 transforaminal epidural steroid injection with sedation.\par \par Patient had a MRI that shows a radicular component along with pain referred into the lower extremity. Patient has trialed rehab (Home exercise, physical therapy or chiropractic care) and medications I will schedule a L4-5 SNRI. \par \par Risk, benefits, pros and cons of procedure were explained to the patient using models and diagrams and their questions were answered. \par \par \par The patient has severe anxiety of procedures that necessitates monitored anesthesia care (MAC). The procedure performed will be close to major nerves, arteries, and spinal cord and/or joint structures. Due to the proximity of these structures, we need the patient to be still during the procedure.  With the help of MAC, this will be safely achieved and decrease the risk of any complications.\par \par \par Entered by Emilie Benitez, acting as scribe for Dr. Bush.\par  \par The documentation recorded by the scribe, in my presence, accurately reflects the service I personally performed, and the decisions made by me with my edits as appropriate.\par  \par Best Regards, \par Luis Bush MD \par Board Certified, Anesthesiology \par Board Certified, Pain Medicine\par \par

## 2022-12-30 ENCOUNTER — RX RENEWAL (OUTPATIENT)
Age: 77
End: 2022-12-30

## 2023-01-04 ENCOUNTER — APPOINTMENT (OUTPATIENT)
Dept: CARDIOLOGY | Facility: CLINIC | Age: 78
End: 2023-01-04
Payer: MEDICARE

## 2023-01-04 VITALS
DIASTOLIC BLOOD PRESSURE: 60 MMHG | WEIGHT: 130 LBS | SYSTOLIC BLOOD PRESSURE: 118 MMHG | BODY MASS INDEX: 23.92 KG/M2 | HEIGHT: 62 IN | OXYGEN SATURATION: 95 % | HEART RATE: 74 BPM

## 2023-01-04 DIAGNOSIS — F41.9 ANXIETY DISORDER, UNSPECIFIED: ICD-10-CM

## 2023-01-04 DIAGNOSIS — E11.9 TYPE 2 DIABETES MELLITUS W/OUT COMPLICATIONS: ICD-10-CM

## 2023-01-04 PROCEDURE — 99214 OFFICE O/P EST MOD 30 MIN: CPT

## 2023-01-04 RX ORDER — METHYLPREDNISOLONE 4 MG/1
4 TABLET ORAL
Qty: 1 | Refills: 0 | Status: DISCONTINUED | COMMUNITY
Start: 2022-11-28 | End: 2023-01-04

## 2023-01-04 RX ORDER — METHYLPREDNISOLONE 4 MG/1
4 TABLET ORAL
Qty: 1 | Refills: 0 | Status: DISCONTINUED | COMMUNITY
Start: 2022-12-14 | End: 2023-01-04

## 2023-01-04 NOTE — PHYSICAL EXAM
[S3] : no S3 [S4] : no S4 [Right Carotid Bruit] : no bruit heard over the right carotid [Left Carotid Bruit] : no bruit heard over the left carotid [Right Femoral Bruit] : no bruit heard over the right femoral artery [Left Femoral Bruit] : no bruit heard over the left femoral artery [FreeTextEntry1] : pt walks several times a week. More muscle cramping at least one x /wk

## 2023-01-04 NOTE — HISTORY OF PRESENT ILLNESS
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   8/20 4 stents RCA Dr Daniels, HLD,HTN, PAF MOHINI vasc 5  on eliquis,  DM, lumbar disc disease . She had equivocal SE 7/22 echo with EF 45-50% grade 1 diastolic dysfunction and lexiscan 8/22 partial reperfusion inferolat segment mild ischemia  area of prior infarct Dr Marte reviewed and will continue medical management on BB ARB ASA, CCB. Pt still has chest tightness  with and without exertion lasting less than 1 min possibly once/wk.  She walks every day without any symptoms. She is followed by Dr Jacob PTH elevated.  She also sees renal and has f/u appt in a few weeks. Pt had an episode of 3 min SOB and  racing heart  one time several weeks ago. No dizziness syncope or near syncope. She was very anxious at the time and thinks it was anxiety. She denied chest pain. She is waiting almost 1 mth for  insurance approval for her epidural injections for her back. She is in pain and is very upset about the delay.  She remains very anxious [Dyspnea] : no dyspnea [Prev Anesthesia Reaction] : no previous anesthesia reaction [Anesthesia Reaction] : no anesthesia reaction [Sudden Death] : no sudden death

## 2023-01-04 NOTE — REVIEW OF SYSTEMS
[Fever] : no fever [Chills] : no chills [Blurry Vision] : no blurred vision [Earache] : no earache [SOB] : no shortness of breath [Lower Ext Edema] : no extremity edema [Palpitations] : no palpitations [Cough] : no cough [Wheezing] : no wheezing [Abdominal Pain] : no abdominal pain [Constipation] : no constipation [Rash] : no rash [Skin Lesions] : no skin lesions [Dizziness] : no dizziness [Weakness] : no weakness [Confusion] : no confusion was observed [FreeTextEntry2] : Sleeps poorly, intermittently  says she snores. Feels tired

## 2023-01-04 NOTE — DISCUSSION/SUMMARY
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   8/20 4 stents RCA Dr Daniels, HLD,HTN, PAF MOHINI vasc 5 ,  DM, lumbar disc disease for clearance and AC management for epidural injections by Pain management\par Pt was for pre op clearance but has had chest pressure with and without exertion lasting 1 min several times associated with dizziness and slight ataxia. No diaphoresis. No palpitations. She is not sure if its coming from her back.Pt had equivocal Stress echo 7/22 had chest pain. Lexiscan 8/22 partlal reperfusion inferolat ischemia  area of prior infarct. Dr Marte reviewed and will continue medical management on BB ARB ASA, CCB.  Instructed pt to go to ER for persistent pain. told pt to inform us of increased frequency of pain. Aware we would consider cardiac cath if pain increases .Consider adding Imdur. Pt does not want to take another med for now.  Pt sleeps poorly not sure if snores ,will refer to pulm for sleep eval. She is waiting to do that . She is having cramping in her thighs at least one/ wk . Supportive treatment ie stretching, hydration, heat prior to bed recommended. If no improvement will ck CPK and consider changing or reducing statin dose. Pt is very anxious and upset re not getting her epidural injections due to insurance delay. Anxiety treatment measures recommended deep breathing visual imagery.  Pt had a 3 min episode of  racing heart and SOB  no chest pain when she was very anxious. If any symptoms return  will need evaluation possible holter or MCOT. F/u 3 mth

## 2023-01-23 ENCOUNTER — APPOINTMENT (OUTPATIENT)
Dept: ORTHOPEDIC SURGERY | Facility: CLINIC | Age: 78
End: 2023-01-23

## 2023-01-24 ENCOUNTER — APPOINTMENT (OUTPATIENT)
Dept: PAIN MANAGEMENT | Facility: CLINIC | Age: 78
End: 2023-01-24
Payer: MEDICARE

## 2023-01-24 PROCEDURE — 93040 RHYTHM ECG WITH REPORT: CPT | Mod: 59

## 2023-01-24 PROCEDURE — 64483 NJX AA&/STRD TFRM EPI L/S 1: CPT | Mod: 50

## 2023-01-24 PROCEDURE — 94761 N-INVAS EAR/PLS OXIMETRY MLT: CPT

## 2023-01-24 PROCEDURE — 00630 ANES PX LUMBAR REGION NOS: CPT | Mod: QZ,P3

## 2023-01-24 PROCEDURE — 72100 X-RAY EXAM L-S SPINE 2/3 VWS: CPT

## 2023-01-24 PROCEDURE — 93770 DETERMINATION VENOUS PRESS: CPT

## 2023-01-24 NOTE — PROCEDURE
[FreeTextEntry1] : SELECTIVE TRANSFORAMINAL LUMBAR L4-5 EPIDURAL NERVE ROOT INJECTION UNDER FLUOROSCOPY   [FreeTextEntry3] : Date:  2023\par \par Patient: MALIHA LOZA\par \par :  1945\par \par \par \par \par Preoperative Diagnosis: Lumbar Radiculopathy\par \par \par \par Procedure:\par 1. Selective Bilateral L4-5 Transforaminal Lumbar Epidural Nerve Root Injection under Fluoroscopy\par 2. Epidurography\par 3. Fluoroscopic guidance and localization of needle\par \par \par \par Physician: Luis Bush M.D.\par Anesthesia:  MAC. Versed 2mg, Fentanyl 100 mcg \par Anesthesiologist/CRNA: Mr. Peña\par Medical Necessity:  Failure of conservative management.\par Consent:  Though unusual, the possible complications including infection, bleeding, nerve damage, hospital admission, stroke, pneumothorax, death or failure of the procedure are theoretically possible. The patient was educated about the of the procedure and alternative therapies. All questions were answered and the patient freely gave consent to proceed.\par Indication for Fluoroscopy:  This procedure requires the precise placement of the spinal needle into the epidural space.  It is the only way to accurately and safely perform the injection.\par \par \par \par PROCEDURE NOTE:\par After obtaining written consent, the patient was then positioned on the fluoroscopy table in the prone position with a pillow beneath the pelvis to reduce lumbar lordosis. The lumbar area was prepped with betadine solution and draped in the usual manner. A time out was performed. The fluoroscope was used to identify the L3///L4///L5 vertebral body on the AP projection. It was then rotated into an oblique projection until the superior articulating process of the L5 (inferior) vertebra is projected beneath the 6 o'clock position of the L4 (superior) vertebrae. The 22 gauge 3-1/2 inch needle was inserted in the skin at a point overlying the superior articulating process of the inferior vertebra and aimed for the 6 o'clock position of the superior vertebrae's pedicle.  After the needle contacted bone, a lateral projection was obtained to insure that the needle tip was in proximity with the vertebral body. Paresthesias were not noted.  One ml of Omnipaque 240 was injected and a neurogram was obtained. Following demonstration of the neurogram, 1 ml of Preservative free normal saline and 1 ml of dexamethasone (10mg) was injected. The small volume and relatively high concentration was chosen to preserve selectivity and diagnostic value of the injection. There was no CSF nor heme identified. The contralateral side was injected in identical fashion.\par \par \par Epidurogram: The nerve root was observed in its outline on the neurogram. Distal and proximal spread was noted.\par Findings: Lumbar Spine AP and oblique views with x-ray degenerative changes noted.\par \par Complications: none. \par \par Disposition: I have examined the patient and there are no new physical findings since original presentation. The patient was discharged home with a . The discharge instruction sheet was given to the patient. Motor function was intact.\par \par Comment: 1st TFESI today, depending on effectiveness would schedule 2nd TFESI in 1-2 weeks vs caudal epidural steroid vs follow up in office. Call if any problems\par \par \par \par \par Luis Bush MD \par Board Certified, Anesthesiology \par Board Certified, Pain Medicine \par \par

## 2023-02-02 ENCOUNTER — APPOINTMENT (OUTPATIENT)
Dept: PAIN MANAGEMENT | Facility: CLINIC | Age: 78
End: 2023-02-02
Payer: MEDICARE

## 2023-02-02 PROCEDURE — 64483 NJX AA&/STRD TFRM EPI L/S 1: CPT | Mod: 50

## 2023-02-02 PROCEDURE — 93770 DETERMINATION VENOUS PRESS: CPT

## 2023-02-02 PROCEDURE — 94761 N-INVAS EAR/PLS OXIMETRY MLT: CPT | Mod: 59

## 2023-02-02 PROCEDURE — 93040 RHYTHM ECG WITH REPORT: CPT | Mod: 59

## 2023-02-02 PROCEDURE — 00630 ANES PX LUMBAR REGION NOS: CPT | Mod: QZ,P3

## 2023-02-02 PROCEDURE — 72100 X-RAY EXAM L-S SPINE 2/3 VWS: CPT

## 2023-02-02 NOTE — PROCEDURE
[FreeTextEntry1] : SELECTIVE TRANSFORAMINAL LUMBAR L4-5 EPIDURAL NERVE ROOT INJECTION UNDER FLUOROSCOPY   [FreeTextEntry3] : Date:  2023\par \par Patient: MALIHA LOZA\par \par :  1945\par \par \par Preoperative Diagnosis: Lumbar Radiculopathy\par \par \par \par Procedure:\par 1. Selective Bilateral L4-5 Transforaminal Lumbar Epidural Nerve Root Injection under Fluoroscopy\par 2. Epidurography\par 3. Fluoroscopic guidance and localization of needle\par \par \par \par Physician: Luis Bush M.D.\par Anesthesia:  MAC. Versed 2mg, Fentanyl 100 mcg\par Anesthesiologist/CRNA: Ms. Molina\par Medical Necessity:  Failure of conservative management.\par Consent:  Though unusual, the possible complications including infection, bleeding, nerve damage, hospital admission, stroke, pneumothorax, death or failure of the procedure are theoretically possible. The patient was educated about the of the procedure and alternative therapies. All questions were answered and the patient freely gave consent to proceed.\par Indication for Fluoroscopy:  This procedure requires the precise placement of the spinal needle into the epidural space.  It is the only way to accurately and safely perform the injection.\par \par \par \par PROCEDURE NOTE:\par After obtaining written consent, the patient was then positioned on the fluoroscopy table in the prone position with a pillow beneath the pelvis to reduce lumbar lordosis. The lumbar area was prepped with betadine solution and draped in the usual manner. A time out was performed. The fluoroscope was used to identify the L3///L4///L5 vertebral body on the AP projection. It was then rotated into an oblique projection until the superior articulating process of the L5 (inferior) vertebra is projected beneath the 6 o'clock position of the L4 (superior) vertebrae. The 22 gauge 3-1/2 inch needle was inserted in the skin at a point overlying the superior articulating process of the inferior vertebra and aimed for the 6 o'clock position of the superior vertebrae's pedicle.  After the needle contacted bone, a lateral projection was obtained to insure that the needle tip was in proximity with the vertebral body. Paresthesias were not noted.  One ml of Omnipaque 240 was injected and a neurogram was obtained. Following demonstration of the neurogram, 1 ml of Preservative free normal saline and 1 ml of dexamethasone (10mg) was injected. The small volume and relatively high concentration was chosen to preserve selectivity and diagnostic value of the injection. There was no CSF nor heme identified. The contralateral side was injected in identical fashion.\par \par \par Epidurogram: The nerve root was observed in its outline on the neurogram. Distal and proximal spread was noted.\par Findings: Lumbar Spine AP and oblique views with x-ray degenerative changes noted.\par \par Complications: none. \par \par Disposition: I have examined the patient and there are no new physical findings since original presentation. The patient was discharged home with a . The discharge instruction sheet was given to the patient. Motor function was intact.\par \par Comment: 2nd TFESI today, depending on effectiveness would schedule 3rd TFESI in 1-2 weeks vs caudal epidural steroid vs follow up in office. Call if any problems\par \par \par \par \par Luis Bush MD \par Board Certified, Anesthesiology \par Board Certified, Pain Medicine \par \par

## 2023-02-06 ENCOUNTER — APPOINTMENT (OUTPATIENT)
Dept: PAIN MANAGEMENT | Facility: CLINIC | Age: 78
End: 2023-02-06
Payer: MEDICARE

## 2023-02-06 VITALS — WEIGHT: 130 LBS | BODY MASS INDEX: 23.92 KG/M2 | HEIGHT: 62 IN

## 2023-02-06 DIAGNOSIS — M54.12 RADICULOPATHY, CERVICAL REGION: ICD-10-CM

## 2023-02-06 DIAGNOSIS — Z79.899 OTHER LONG TERM (CURRENT) DRUG THERAPY: ICD-10-CM

## 2023-02-06 PROCEDURE — 99214 OFFICE O/P EST MOD 30 MIN: CPT

## 2023-02-06 NOTE — HISTORY OF PRESENT ILLNESS
[FreeTextEntry1] : HISTORY OF PRESENT ILLNESS: Ms. Huggins is a 76 year old female status post Bilateral L4-5 Transforaminal Epidural Steroid Injection done on 5-. She states that her radicular symptoms are well controlled. Today, she states her pain is mainly axial. She states that this pain is present all throughout the day, being a 9/10 on the pain scale. She states that this pain is located in her lumbar spine, centered very low. She describes this pain as sharp and pressure like. She states that this pain gets worse in the evening. She states that due to this pain she is unable to perform household chores.  Of note, She states that her thoracic and cervical pain and under control. 3. \par \par TODAY: Since last visit, she underwent a bilateral L4-5 transforaminal epidural steroid injection on 2-2-2023 with approximately 70% relief for 3 days following this procedure. She is presenting today with ongoing pain in her lower back. She states the pain is in the lower lumbar spine and radiates into the lower extremities. She states the pain shoots into her legs with numbness, tingling and spasms. She also notes axial lower back pain. She states the pain is worse with standing or walking secondary to the pain. She currently rates the pain at a 8/10 on the pain scale. She states the pain is present on a constant basis. \par \par As for her neck pain, she underwent a cervical epidural steroid injection on 12-8-2022 with approximately 50% relief of her cervical pain. She states she did have relief for 1 week but now that pain has returned. She continues with ongoing severe pain into the upper extremities. She continues with numbness and tingling into the arms. She states there is also stiffness and spasms into the neck. She currently rates her pain at a 8/10 on the pain scale. She states the pain is constant in nature and present daily. \par \par She is currently utilizing Tramadol 50 mg BID which provides her with 30-40% improvement in pain and increase in function. She denies any side affects with this medication.

## 2023-02-06 NOTE — PHYSICAL EXAM
[de-identified] : NECK: tenderness into the cervical paraspinals. ROM restricted. Pain with flexion. Positive spurling bilaterally.\par \par LOW BACK: tenderness into the lumbar paraspinals. ROM restricted. Pain with flexion. Positive SLR bilaterally.

## 2023-02-06 NOTE — ASSESSMENT
[FreeTextEntry1] : 76-year-old female with ongoing severe neck and back pain. She is scheduled for a 3rd lumbar epidural injection on 2-. As for her neck pain, Patient is presenting with radicular pain with impairment in ADLs and functionality.  The pain has not responded to conservative care, including medications, stretching, as well as active modalities, such as physical therapy.  Imaging studies as well as physical exam findings corroborate the symptomatology and radicular pain.  We will proceed with an epidural at this point. I will proceed with a cervical epidural steroid injection with sedation. As for her medications, she will continue with Tramadol. Follow up in 6 weeks will be made for reassessment. \par \par Patient had a MRI that shows a radicular component along with pain referred into the upper extremity. Patient has trialed rehab (Home exercise, physical therapy or chiropractic care) and medications. I will schedule a C7-T1 cervical epidural steroid injection. \par \par Risk, benefits, pros and cons of procedure were explained to the patient using models and diagrams and their questions were answered. \par \par \par The patient has severe anxiety of procedures that necessitates monitored anesthesia care (MAC). The procedure performed will be close to major nerves, arteries, and spinal cord and/or joint structures. Due to the proximity of these structures, we need the patient to be still during the procedure.  With the help of MAC, this will be safely achieved and decrease the risk of any complications.\par \par Overall there is at least a 30-50% reduction in pain with the prescribed analgesics. The patient denies any adverse side effects due to the medication (sleeping disturbance, constipation, sleepiness, hallucinations and/or urination problems).\par \par Entered by Emilie Benitez, acting as scribe for Dr. Bush.\par  \par The documentation recorded by the scribe, in my presence, accurately reflects the service I personally performed, and the decisions made by me with my edits as appropriate.\par  \par Best Regards, \par Luis Bush MD \par Board Certified, Anesthesiology \par Board Certified, Pain Medicine\par \par

## 2023-02-13 ENCOUNTER — APPOINTMENT (OUTPATIENT)
Dept: PAIN MANAGEMENT | Facility: CLINIC | Age: 78
End: 2023-02-13
Payer: MEDICARE

## 2023-02-13 PROCEDURE — 93770 DETERMINATION VENOUS PRESS: CPT

## 2023-02-13 PROCEDURE — 64483 NJX AA&/STRD TFRM EPI L/S 1: CPT | Mod: LT

## 2023-02-13 PROCEDURE — 94761 N-INVAS EAR/PLS OXIMETRY MLT: CPT

## 2023-02-13 PROCEDURE — 93040 RHYTHM ECG WITH REPORT: CPT | Mod: 59

## 2023-02-13 PROCEDURE — 00630 ANES PX LUMBAR REGION NOS: CPT | Mod: QZ,P3

## 2023-02-13 PROCEDURE — 72100 X-RAY EXAM L-S SPINE 2/3 VWS: CPT

## 2023-02-15 NOTE — PROCEDURE
[FreeTextEntry3] : SELECTIVE TRANSFORAMINAL LUMBAR L4-5 EPIDURAL NERVE ROOT INJECTION UNDER FLUOROSCOPY\par \par \par \par Date:  2023\par \par Patient: MALIHA LOZA\par \par :  1945\par \par \par \par \par Preoperative Diagnosis: Lumbar Radiculopathy\par \par \par \par Procedure:\par 1. Selective Bilateral L4-5 Transforaminal Lumbar Epidural Nerve Root Injection under Fluoroscopy\par 2. Epidurography\par 3. Fluoroscopic guidance and localization of needle\par \par \par \par Physician: Luis Bush M.D.\par Anesthesiologist/CRNA: Ms Molina\par Anesthesia: MAC Versed 2 mg IVP\par Medical Necessity:  Failure of conservative management.\par \par \par \par Consent:  Though unusual, the possible complications including infection, bleeding, nerve damage, hospital admission, stroke, pneumothorax, death or failure of the procedure are theoretically possible. The patient was educated about the of the procedure and alternative therapies. All questions were answered and the patient freely gave consent to proceed.\par Indication for Fluoroscopy:  This procedure requires the precise placement of the spinal needle into the epidural space.  It is the only way to accurately and safely perform the injection.\par \par \par PROCEDURE NOTE:\par  After obtaining written consent, the patient was then positioned on the fluoroscopy table in the prone position with a pillow beneath the pelvis to reduce lumbar lordosis. The lumbar area was prepped with betadine solution and draped in the usual manner. A time out was performed. The fluoroscope was used to identify the L3///L4///L5 vertebral body on the AP projection. It was then rotated into an oblique projection until the superior articulating process of the L5 (inferior) vertebra is projected beneath the 6 o'clock position of the L4 (superior) vertebrae. The 22 gauge 3.5inch needle was inserted in the skin at a point overlying the superior articulating process of the inferior vertebra and aimed for the 6 o'clock position of the superior vertebrae's pedicle.  After the needle contacted bone, a lateral projection was obtained to insure that the needle tip was in proximity with the vertebral body. Paresthesias were not noted.  One ml of Omnipaque 240 was injected and a neurogram was obtained. Following demonstration of the neurogram, 1 ml of Preservative free normal saline and 1 ml of dexamethasone (10mg) was injected. The small volume and relatively high concentration was chosen to preserve selectivity and diagnostic value of the injection. There was no CSF or heme identified. Contralateral side done in a similar fashion.\par \par \par \par Epidurogram: The nerve root was observed in its outline on the neurogram. Distal and proximal spread was noted.\par \par Findings: Lumbar Spine AP and oblique views with x-ray degenerative changes noted.\par \par Complications: none. \par \par Disposition: I have examined the patient and there are no new physical findings since original presentation. The patient was discharged home with a . The discharge instruction sheet was given to the patient. Motor function was intact.\par \par Comment: 3rd TFESI today, Will schedule a  follow up in office. Call if any problems\par \par \par \par This document was signed by: \par \par Luis Bush MD \par Board Certified, Anesthesiology \par Board Certified, Pain Medicine \par \par

## 2023-02-17 NOTE — PATIENT PROFILE ADULT. - PURPOSEFUL PROACTIVE ROUNDING
All labs discussed via  # [id number] . Pt received Live well message but did not understand. Verified repeat labs were ordered.US was scheduled for 02/21/23. Note closed.  
Merna Richard Patient Age: 47 year old  MESSAGE: Interpreting service used: No    Insurance on file confirmed with caller: Yes    IM/FP- Results- Patient Returning Call for Results        Type of test: LABS    Date of test: 2/14    Additional information: N/A    Provider's home site- [place]- CONNECT CALL TO CALL CENTER [facility] QUEUE- ROUTE MESSAGE TO CALL CENTER [facility] POOL ([phone])    Message read back to caller for accuracy: Yes       ALLERGIES:  Patient has no known allergies.  Current Outpatient Medications   Medication Sig Dispense Refill   • bisacodyl (Dulcolax) 5 MG EC tablet See Colonoscopy Instructions. 2 tablet 0   • simethicone (MYLICON) 125 MG chewable tablet See Colonoscopy Instructions. 2 tablet 0   • Sodium Sulfate-Mag Sulfate-KCl 1524-358-081 MG Tab Take 12 tablets by mouth as directed. See Colonoscopy Instructions. 24 tablet 0   • fluticasone (FLONASE) 50 MCG/ACT nasal spray SPRAY 1 SPRAY IN EACH NOSTRIL 2 TIMES DAILY. 48 mL 1   • hydroCORTisone (CORTIZONE) 2.5 % cream Apply 1 application topically 2 times daily. 30 g 3   • hydroquinone 4 % cream Apply topically 2 times daily. To dark spots, avoid normal skin 30 g 3   • montelukast (SINGULAIR) 10 MG tablet Take 1 tablet by mouth nightly. 90 tablet 1   • norgestimate-ethinyl estradiol (MonoNessa) 0.25-35 MG-MCG per tablet Take 1 tablet by mouth daily. 30 tablet 11   • Loratadine 10 MG Cap        No current facility-administered medications for this visit.     PHARMACY to use: please request preferred pharmacy from pt if needed             Pharmacy preference(s) on file:   [facility]/pharmacy #[id number] - [place], [place] - [address]  [address] [address]  Phone: [phone] Fax: [phone]      CALL BACK INFO: DO NOT LEAVE A MESSAGE - contact patient directly      PCP: Cyndi Valenzuela,          INS: Payor: [employer] [place] / Plan: LABOR FUND GROUPS / Product Type: PPO MISC   PATIENT ADDRESS:  [address] [place] [id number]      
Patient

## 2023-02-18 ENCOUNTER — RX RENEWAL (OUTPATIENT)
Age: 78
End: 2023-02-18

## 2023-02-20 ENCOUNTER — APPOINTMENT (OUTPATIENT)
Dept: ORTHOPEDIC SURGERY | Facility: CLINIC | Age: 78
End: 2023-02-20
Payer: MEDICARE

## 2023-02-20 DIAGNOSIS — M17.11 UNILATERAL PRIMARY OSTEOARTHRITIS, RIGHT KNEE: ICD-10-CM

## 2023-02-20 PROCEDURE — 20610 DRAIN/INJ JOINT/BURSA W/O US: CPT | Mod: RT

## 2023-02-20 PROCEDURE — 99213 OFFICE O/P EST LOW 20 MIN: CPT | Mod: 25

## 2023-02-20 NOTE — PHYSICAL EXAM
[Right] : right knee [Negative] : negative Elizabeth's [] : non-antalgic [FreeTextEntry9] :   Full flexion-extension with mild pain to medial joint line [de-identified] :  good strength throughout

## 2023-02-20 NOTE — HISTORY OF PRESENT ILLNESS
[de-identified] : Patient is a 77-year-old female here for reevaluation of right knee pain.  Patient states after her cortisone injection in September she has been feeling much better.  Patient states she is slowly having pain that comes back.  Patient states she is also seeing pain management for back pain, cortisone injections to cervical spine and lumbar spine.  Denies recent injury/trauma, denies numbness/tingling.  Patient states that sometimes the right knee does buckle.  Pain worsens with activity.

## 2023-02-20 NOTE — DISCUSSION/SUMMARY
[de-identified] :  discussed x-rays prior x-rays with patient showing mild osteoarthritis.  Patient is having a flare up of osteoarthritis right knee/possible chronic internal derangement/meniscal tear.   Discussed treatment options at this time putting rest, Tylenol, range-of-motion exercises, physical therapy, cortisone injection.  Advised MRI of right knee with patient, patient wants to hold off.  Patient agreed to cortisone injection.  Patient is a diabetic, states that this morning her blood glucose level was 120.\par \par Dexamethasone and Lidocaine  \par \par Anesthesia: ethyl chloride sprayed topically.. Dexamethasone 20mg/5mL 2 cc.  \par \par Lidocaine 1%: 2 cc.  \par \par   \par \par Medication was injected in the right knee after verbal consent using sterile preparation and technique. The risks, benefits, and alternatives to cortisone injection were explained in full to the patient. Risks outlined include but are not limited to infection, sepsis, bleeding, scarring, skin discoloration, temporary increase in pain, syncopal episode, failure to resolve symptoms, allergic reaction, symptom recurrence, and elevation of blood sugar in diabetics. Patient understood the risks. All questions were answered. After discussion of options, patient requested an injection. Oral informed consent was obtained and sterile prep was done of the injection site. Sterile technique was utilized for the procedure including the preparation of the solutions used for the injection. Patient tolerated the procedure well. Advised to ice the injection site this evening. \par \par   Follow-up in 4 months for re-evaluation.  Advised patient possible MRI/sports department follow-up next visit.  Call if any questions or concerns.  Patient understands agrees with plan seen under supervision of Dr. Segura\par \par

## 2023-02-22 ENCOUNTER — APPOINTMENT (OUTPATIENT)
Dept: PAIN MANAGEMENT | Facility: CLINIC | Age: 78
End: 2023-02-22
Payer: MEDICARE

## 2023-02-22 PROCEDURE — 72040 X-RAY EXAM NECK SPINE 2-3 VW: CPT

## 2023-02-22 PROCEDURE — 99152Z: CUSTOM

## 2023-02-22 PROCEDURE — 93770 DETERMINATION VENOUS PRESS: CPT

## 2023-02-22 PROCEDURE — 93040 RHYTHM ECG WITH REPORT: CPT | Mod: 59

## 2023-02-22 PROCEDURE — 62321 NJX INTERLAMINAR CRV/THRC: CPT

## 2023-02-22 PROCEDURE — 94761 N-INVAS EAR/PLS OXIMETRY MLT: CPT

## 2023-02-22 NOTE — PROCEDURE
[FreeTextEntry1] : CERVICAL EPIDURAL STEROID INJECTION [FreeTextEntry3] : CERVICAL EPIDURAL STEROID INJECTION\par \par \par \par \par Date:  2023\par \par Patient: Sandra Huggins\par \par :  1945\par \par \par Preoperative Diagnosis: Cervical radiculopathy\par Postoperative Diagnosis: Cervical radiculopathy\par \par \par Procedure: \par 1. Interlaminar C7-T1 Cervical Epidural Injection under fluoroscopy\par 2. Fluoroscopic guidance and localization of needle\par \par \par Physician: Luis Bush M.D.\par  Anesthesia: MAC. IV Sedation Versed 2mg, \par \par \par Medical Necessity:  Failure of conservative management.\par Indication for Fluoroscopy:  This procedure requires the precise placement of the spinal needle into the epidural space.  It is the only way to accurately and safely perform the injection.\par Consent:  Though unusual, the possible complications including infection, bleeding, nerve damage, hospital admission, stroke, pneumothorax, death or failure of the procedure are theoretically possible. The patient was educated about the of the procedure and alternative therapies. All questions were answered and the patient freely gave consent to proceed.\par The patient was also told that sometimes patients will notice upper and/or lower extremity weakness immediately following the procedure due to extravasation of local anesthetic solution onto the main nerve root during the procedure. In addition, the patient was informed of other possible complications such as phrenic nerve injury, weak/heavy head, or muscle injury.  Lastly, the patient was informed that 1 to 2 weeks of perioperative discomfort following the procedure is to be expected.\par \par \par Monitoring:  Patient had continuous blood pressure, EKG, and pulse oximetry throughout the case. See nurse's notes.\par \par \par \par \par PROCEDURE NOTE: After obtaining written consent, the patient was positioned prone on the operating table. The back to her neck and upper thorax was prepped with chloral prep and draped in usual sterile fashion.  A time out was performed.  The C7-T1 interspace was identified using fluoroscopy. The skin was infiltrated with lidocaine 2% -- 1 cc for subcutaneous analgesia.  The epidural space was identified using a 18g touhy needle with a midline approach using a loss of resistance technique. A solution of 5 ml of preservative-free sterile saline and 1ml of dexamethasone 10mg, 1cc was infused with minimal pressure on the syringe into the epidural space.  The needle tract and tubing were cleared with 2ml of preservative free normal saline. The procedure was tolerated well. There was no evidence of CSF, Paresthesias nor heme.  \par \par \par Findings: Cervical Spine AP and oblique views with x-ray degenerative changes noted.\par \par Complications: none.  \par \par Disposition: I have examined the patient and there are no new physical findings since original presentation. The patient was discharged home with a . The discharge instruction sheet was given to the patient. Motor function was intact.\par \par \par \par \par Comment: 1st RADHA today, depending on effectiveness would schedule 2nd RADHA in 1-2 weeks vs caudal epidural steroid vs follow up in office. Call if any problems\par \par \par \par \par This document was signed by:\par \par Luis Bush MD  \par Board Certified, Anesthesiology  \par Board Certified, Pain Medicine  \par \par \par

## 2023-02-27 ENCOUNTER — APPOINTMENT (OUTPATIENT)
Dept: PAIN MANAGEMENT | Facility: CLINIC | Age: 78
End: 2023-02-27
Payer: MEDICARE

## 2023-02-27 ENCOUNTER — APPOINTMENT (OUTPATIENT)
Dept: RADIOLOGY | Facility: CLINIC | Age: 78
End: 2023-02-27

## 2023-02-27 VITALS — HEIGHT: 62 IN | WEIGHT: 130 LBS | BODY MASS INDEX: 23.92 KG/M2

## 2023-02-27 PROCEDURE — 73502 X-RAY EXAM HIP UNI 2-3 VIEWS: CPT

## 2023-02-27 PROCEDURE — 99214 OFFICE O/P EST MOD 30 MIN: CPT

## 2023-02-27 NOTE — ASSESSMENT
[FreeTextEntry1] : 77-year-old female with ongoing severe neck and back pain. She is status post a cervical epidural injection on 2/22/23 which provided her with 80% relief. Her pain has since returned and she would like to move forward with a repeat RADHA injection. In regards to her right hip pain, we will obtain an x-ray for further review. She will follow up in 3 weeks to review imaging and reassessment. \par \par Will order a right hip 2 view x-ray due to pain and decrease in range of motion in that area to delineate a pain generator.\par \par Patient had a MRI that shows a radicular component along with pain referred into the upper extremity. Patient has trialed rehab (Home exercise, physical therapy or chiropractic care) and medications. I will schedule a C7-T1 cervical epidural steroid injection. \par \par Risk, benefits, pros and cons of procedure were explained to the patient using models and diagrams and their questions were answered. \par \par \par The patient has severe anxiety of procedures that necessitates monitored anesthesia care (MAC). The procedure performed will be close to major nerves, arteries, and spinal cord and/or joint structures. Due to the proximity of these structures, we need the patient to be still during the procedure.  With the help of MAC, this will be safely achieved and decrease the risk of any complications.\par \par Overall there is at least a 30-50% reduction in pain with the prescribed analgesics. The patient denies any adverse side effects due to the medication (sleeping disturbance, constipation, sleepiness, hallucinations and/or urination problems).\par \par Entered by Starla Bean, acting as scribe for Dr. Bush.\par  \par The documentation recorded by the scribe, in my presence, accurately reflects the service I personally performed, and the decisions made by me with my edits as appropriate.\par  \par Best Regards, \par Luis Bush MD \par Board Certified, Anesthesiology \par Board Certified, Pain Medicine\par \par

## 2023-02-27 NOTE — HISTORY OF PRESENT ILLNESS
[FreeTextEntry1] : HISTORY OF PRESENT ILLNESS: Ms. Huggins is a 77 year old female status post Bilateral L4-5 Transforaminal Epidural Steroid Injection done on 5-. She states that her radicular symptoms are well controlled. Today, she states her pain is mainly axial. She states that this pain is present all throughout the day, being a 9/10 on the pain scale. She states that this pain is located in her lumbar spine, centered very low. She describes this pain as sharp and pressure like. She states that this pain gets worse in the evening. She states that due to this pain she is unable to perform household chores.  Of note, She states that her thoracic and cervical pain and under control. 3. \par \par TODAY: She is presenting today with ongoing pain in her lower back. She states the pain is in the lower lumbar spine and radiates into the lower extremities. She states the pain shoots into her legs as well as right hip with numbness, tingling and spasms. Shooting pain in the right hip and leg. Her right hip pain is her chief complaint. She also notes axial lower back pain. She states the pain is worse with standing or walking secondary to the pain. She currently rates the pain at a 8/10 on the pain scale. She states the pain is present on a constant basis. \par \par As for her neck pain, she underwent a cervical epidural steroid injection on 2/23/23 with approximately 80% relief of her cervical pain. She states she did have relief for 2 weeks with increase mobility in the neck. Pain has started to return. Pain continues to radiate into the right trapezial muscle and right arm. Pain is associated with numbness and tingling. \par \par She is currently utilizing Tramadol 50 mg BID which provides her with 30-40% improvement in pain and increase in function. She denies any side affects with this medication. \par

## 2023-02-27 NOTE — PHYSICAL EXAM
[de-identified] : NECK: tenderness into the cervical paraspinals. ROM restricted. Pain with flexion. Positive spurling bilaterally.\par \par LOW BACK: tenderness into the lumbar paraspinals. ROM restricted. Pain with flexion. Positive SLR bilaterally. \par \par HIP (R)- tenderness noted over right hip, range of motion restricted.

## 2023-03-08 ENCOUNTER — APPOINTMENT (OUTPATIENT)
Dept: PAIN MANAGEMENT | Facility: CLINIC | Age: 78
End: 2023-03-08
Payer: MEDICARE

## 2023-03-08 PROCEDURE — 99152Z: CUSTOM

## 2023-03-08 PROCEDURE — 72040 X-RAY EXAM NECK SPINE 2-3 VW: CPT

## 2023-03-08 PROCEDURE — 62321 NJX INTERLAMINAR CRV/THRC: CPT

## 2023-03-08 PROCEDURE — 93040 RHYTHM ECG WITH REPORT: CPT | Mod: 59

## 2023-03-08 PROCEDURE — 93770 DETERMINATION VENOUS PRESS: CPT | Mod: 59

## 2023-03-08 NOTE — PROCEDURE
[FreeTextEntry1] : CERVICAL EPIDURAL STEROID INJECTION [FreeTextEntry3] : CERVICAL EPIDURAL STEROID INJECTION\par \par \par \par \par Date:  2023\par \par Patient: Lena Huggins\par \par :  1945\par \par \par Preoperative Diagnosis: Cervical radiculopathy\par Postoperative Diagnosis: Cervical radiculopathy\par \par \par Procedure: \par 1. Interlaminar C7-T1 Cervical Epidural Injection under fluoroscopy\par \par 2. Fluoroscopic guidance and localization of needle\par \par \par Physician: Luis Bush M.D.  \par Anesthesia: MAC. IV Sedation versed 2mg, (sugar 82)\par \par \par Medical Necessity:  Failure of conservative management.\par Indication for Fluoroscopy:  This procedure requires the precise placement of the spinal needle into the epidural space.  It is the only way to accurately and safely perform the injection.\par Consent:  Though unusual, the possible complications including infection, bleeding, nerve damage, hospital admission, stroke, pneumothorax, death or failure of the procedure are theoretically possible. The patient was educated about the of the procedure and alternative therapies. All questions were answered and the patient freely gave consent to proceed.\par The patient was also told that sometimes patients will notice upper and/or lower extremity weakness immediately following the procedure due to extravasation of local anesthetic solution onto the main nerve root during the procedure. In addition, the patient was informed of other possible complications such as phrenic nerve injury, weak/heavy head, or muscle injury.  Lastly, the patient was informed that 1 to 2 weeks of perioperative discomfort following the procedure is to be expected.\par \par \par Monitoring:  Patient had continuous blood pressure, EKG, and pulse oximetry throughout the case. See nurse's notes.\par \par \par \par \par PROCEDURE NOTE: After obtaining written consent, the patient was positioned prone on the operating table. The back to her neck and upper thorax was prepped with chlora prep and draped in usual sterile fashion.  A time out was performed.  The C7-T1 interspace was identified using fluoroscopy. The skin was infiltrated with lidocaine 2% -- 1 cc for subcutaneous analgesia.  The epidural space was identified using a 18g touhy needle with a midline approach using a loss of resistance technique.  A solution of 5 ml of preservative-free sterile saline and 1ml of dexamethasone 10mg, 1cc was infused with minimal pressure on the syringe into the epidural space.  The needle tract and tubing were cleared with 2ml of preservative free normal saline. The procedure was tolerated well. There was no evidence of CSF, Paresthesias nor heme.  \par \par \par No dye used \par \par \par Findings: Cervical Spine AP and oblique views with x-ray degenerative changes noted.\par \par Complications: none.  \par \par Disposition: I have examined the patient and there are no new physical findings since original presentation. The patient was discharged home with a . The discharge instruction sheet was given to the patient. Motor function was intact.\par \par \par \par \par Comment: 2nd RADHA today, depending on effectiveness would schedule 3rd RADHA follow up in office. Call if any problems\par \par \par \par \par This document was signed by:\par \par Luis Bush MD  \par Board Certified, Anesthesiology  \par Board Certified, Pain Medicine  \par \par \par

## 2023-03-27 ENCOUNTER — APPOINTMENT (OUTPATIENT)
Dept: PAIN MANAGEMENT | Facility: CLINIC | Age: 78
End: 2023-03-27
Payer: MEDICARE

## 2023-03-27 VITALS — HEIGHT: 62 IN | BODY MASS INDEX: 23.92 KG/M2 | WEIGHT: 130 LBS

## 2023-03-27 DIAGNOSIS — M54.16 RADICULOPATHY, LUMBAR REGION: ICD-10-CM

## 2023-03-27 PROCEDURE — 99214 OFFICE O/P EST MOD 30 MIN: CPT

## 2023-03-27 NOTE — PHYSICAL EXAM
[de-identified] : NECK: mild tenderness into the cervical paraspinals. \par \par LOW BACK: tenderness into the lumbar paraspinals. ROM restricted. Pain with flexion/extension. Positive SLR bilaterally. Positive facet loading bilaterally. Decrease in achilles reflexes 0/4 bilaterally. \par \par HIP (R)- tenderness noted over right hip, range of motion restricted.

## 2023-03-27 NOTE — DATA REVIEWED
[FreeTextEntry1] : MRI of the lumbar spine taken on 12/07/2022 showed mild lumbar degenerative disc/facet change.  No marian lumbar disc herniation, spinal canal foraminal stenosis.  Minimal fluid/cyst information posterior to the right L4-5 facet joint on a degenerative basis a new finding.  Increase in size of a probable left renal cyst.\par \par 03/01/22 EMG UE – shows evidence of compression of both median nerves at the wrist, consistent with bilateral carpal tunnel syndrome.  02/22/22 EMG LE – Normal\par \par   MRI of the cervical spine 11/30/2020: Diffuse degenerative cervical disc change/disc protrusions. C5-C6 cervical canal narrowing without cord flattening. Multilevel foraminal narrowing greatest, right C5-6 level.  CT of the lumbar spine 06/18/2020: Status post L1-2 L2 through L5-S1 discography. Minimal mid, lower lumbar degenerative disc change. Mid, lower lumbar bilateral foraminal disc extension. Lower lumbar degenerative facet disease. Left L2-L3 settle posterior disc protrusion with left L2-L3 foraminal disc extension/posterior annular tear which almost abuts the left L2 nerve root. No significant spinal canal or foraminal stenosis. He has large correlate with those noted on the prior exam considering modality differences.  MRI of the thoracic spine 12/03/2018: Midthoracic degenerative disc change. Right posterolateral T8-T9 disc herniation with right anterolateral cord invagination. Chronic superior T11 endplate depression. Patent thoracic canal and neural foramina. No change. Question of a subcentimeter medial right upper left nodule. No abnormalities seen at this side on the prior chest CT 2/17. Finding possibly artifactual. Follow-up noncontrast chest CT may confirm.\par \par   MRI of the lumbar spine 09/02/2017: Minimal mid lumbar dextroscoliosis. Minimal mid lumbar degenerative disc change. No marian disc herniation, spinal canal or foraminal stenosis. No significant change.\par \par X-ray of the right hip taken on 2- FINDINGS: Two views of the right hip demonstrate no acute osseous injury or destructive lesion. Mild degenerative arthritis with subchondral degenerative cyst formation is present in the acetabular roof. There is mild joint space narrowing without joint destruction or femoral head deformity. Visualized portion of the sacrum and right sacroiliac joint appear preserved. Irregular densities over the mid pelvis may represent calcified fibroids, soft tissue granulomas, or fecal artifact. If clinically indicated, this can be further evaluated with pelvic ultrasound study.

## 2023-03-27 NOTE — HISTORY OF PRESENT ILLNESS
[FreeTextEntry1] : HISTORY OF PRESENT ILLNESS: Ms. Huggins is a 77 year old female status post Bilateral L4-5 Transforaminal Epidural Steroid Injection done on 5-. She states that her radicular symptoms are well controlled. Today, she states her pain is mainly axial. She states that this pain is present all throughout the day, being a 9/10 on the pain scale. She states that this pain is located in her lumbar spine, centered very low. She describes this pain as sharp and pressure like. She states that this pain gets worse in the evening. She states that due to this pain she is unable to perform household chores.  Of note, She states that her thoracic and cervical pain and under control. 3. \par \par TODAY: Since last visit, she underwent a cervical epidural steroid injection on 3-8-2023 with approximately 90% relief from this procedure. She states the pain is only flare up with physical activity. She currently rates the pain at a 4/10 on the pain scale. \par \par As for her lower back pain, she states the pain is in the back and radiates into the right thigh in a anterior aspect. She states the pain is worse with standing or walking secondary to the pain. She states the pain is worse with rotational movements. At times she states her legs give out. She has some weakness as well in her legs. She states the pain is rated at a 7/10 on the pain scale. She does also have some radiating pain into the mid thoracic region as well.\par \par As for her right hip pain, she states the pain still persists. She states there is ongoing pain which is worse with rotational movements. She states the pain is worse with putting pressure over the right hip joint. She rates the pain at a 6/10 on the pain scale.\par \par As for her right sided stomach pain, she states she is currently in the process of undergoing tests.\par \par She is currently utilizing Tramadol 50 mg BID which provides her with 30-40% improvement in pain and increase in function. She denies any side affects with this medication. \par

## 2023-04-23 PROBLEM — M51.36 DEGENERATION, INTERVERTEBRAL DISC, LUMBAR: Status: ACTIVE | Noted: 2022-09-28

## 2023-04-25 ENCOUNTER — APPOINTMENT (OUTPATIENT)
Dept: PAIN MANAGEMENT | Facility: CLINIC | Age: 78
End: 2023-04-25
Payer: MEDICARE

## 2023-04-25 VITALS — HEIGHT: 62 IN | WEIGHT: 130 LBS | BODY MASS INDEX: 23.92 KG/M2

## 2023-04-25 DIAGNOSIS — M47.812 SPONDYLOSIS W/OUT MYELOPATHY OR RADICULOPATHY, CERVICAL REGION: ICD-10-CM

## 2023-04-25 PROCEDURE — 99214 OFFICE O/P EST MOD 30 MIN: CPT

## 2023-04-25 NOTE — HISTORY OF PRESENT ILLNESS
[FreeTextEntry1] : HISTORY OF PRESENT ILLNESS: Ms. Huggins is a 77 year old female status post Bilateral L4-5 Transforaminal Epidural Steroid Injection done on 5-. She states that her radicular symptoms are well controlled. Today, she states her pain is mainly axial. She states that this pain is present all throughout the day, being a 9/10 on the pain scale. She states that this pain is located in her lumbar spine, centered very low. She describes this pain as sharp and pressure like. She states that this pain gets worse in the evening. She states that due to this pain she is unable to perform household chores.  Of note, She states that her thoracic and cervical pain and under control. 3. \par \par TODAY: Revisit encounter. \par \par As for her neck pain, she currently rates the pain as a 9/10 on the pain scale.  She states the pain is mainly across the neck.  She has stiffness and spasms which travel into the trapezius muscles.  She denies any radicular symptoms.  She also has pain with turning her head from left to right.\par \par As per her lower back pain, she currently rates the pain as a 9/10 on the pain scale.  She states the pain is associated with stiffness and spasms.  She states she has no radicular feature.  She also has chief complaints of pain with sleeping or turning from to the left or right.\par \par She is currently utilizing gabapentin along with tramadol with approximately 30-40% improvement in pain and as well as increase in function.  She denies any side effects.\par \par \par

## 2023-04-25 NOTE — ASSESSMENT
[FreeTextEntry1] : 77-year-old female with ongoing neck and lower back pain. Patient is presenting with mainly axial pain with impairment in ADLs and functionality pointing to facet joints.  The pain has not responded to conservative care, including medications, stretching, as well as active modalities, such as physical therapy.  Imaging studies as well as physical exam findings corroborate the symptomatology and point to the facet joints.  We will proceed with diagnostic medial branch blocks. She will continue with her current medication regimen. I have explained the findings to the patient and all questions have been answered. \par \par Patient had paravertebral tenderness and pain on spinal movement with facet loading. Patient has trialed rehab (Home exercise, physical therapy or chiropractic care) and medications.   Schedule a bilateral diagnostic lumbar medial branch injection L3-S1, if 70% immediate relief for greater than 30 minutes or 50% greater than 24 hours, would schedule RFA at  lumbar medial branches.  If greater than 50% intermediate relief for 30 minutes would schedule a second diagnostic lumbar medial branch block, and if greater than 50% intermediate relief for 30 minutes, would schedule a RFA at lumbar medial branches. \par \par Patient had paravertebral tenderness and pain on spinal movement with facet loading. Patient has trialed rehab (Home exercise, physical therapy or chiropractic care) and medications.  Schedule a bilateral diagnostic cervical medial branch injection C3-C6, if 70% immediate relief for greater than 30 minutes or greater than 50% for 24 hours, would schedule RFA at cervical medial branches next, if greater than 50% intermediate relief for 30 minutes, would schedule a second diagnostic cervical medial branch block, and if greater than 50% intermediate relief for 30 minutes, would schedule a RFA at medial branches. \par \par Risk, benefits, pros and cons of procedure were explained to the patient using models and diagrams and their questions were answered. \par \par \par The patient has severe anxiety of procedures that necessitates monitored anesthesia care (MAC). The procedure performed will be close to major nerves, arteries, and spinal cord and/or joint structures. Due to the proximity of these structures, we need the patient to be still during the procedure.  With the help of MAC, this will be safely achieved and decrease the risk of any complications.\par  \par \par Overall there is at least a 30-50% reduction in pain with the prescribed analgesics. The patient denies any adverse side effects due to the medication (sleeping disturbance, constipation, sleepiness, hallucinations and/or urination problems).\par \par Entered by Emilie Benitez, acting as scribe for Dr. Bush.\par  \par The documentation recorded by the scribe, in my presence, accurately reflects the service I personally performed, and the decisions made by me with my edits as appropriate.\par  \par Best Regards, \par Luis Bush MD \par Board Certified, Anesthesiology \par Board Certified, Pain Medicine\par  \par

## 2023-04-25 NOTE — DATA REVIEWED
[FreeTextEntry1] : MRI of the lumbar spine taken on 04/08/2023 showed lumbar dextroscoliosis.  Subtle L4-5 spondylolisthesis.  Mid lumbar degenerative disc/that change.  No marian lumbar disc herniation or significant spinal canal/foraminal stenosis.  The cyst posterior to the right L4-5 facet joint on the prior study has resolved.  No significant change otherwise.\par \par MRI of the lumbar spine taken on 12/07/2022 showed mild lumbar degenerative disc/facet change.  No marian lumbar disc herniation, spinal canal foraminal stenosis.  Minimal fluid/cyst information posterior to the right L4-5 facet joint on a degenerative basis a new finding.  Increase in size of a probable left renal cyst.\par \par 03/01/22 EMG UE – shows evidence of compression of both median nerves at the wrist, consistent with bilateral carpal tunnel syndrome.  02/22/22 EMG LE – Normal\par \par   MRI of the cervical spine 11/30/2020: Diffuse degenerative cervical disc change/disc protrusions. C5-C6 cervical canal narrowing without cord flattening. Multilevel foraminal narrowing greatest, right C5-6 level.  CT of the lumbar spine 06/18/2020: Status post L1-2 L2 through L5-S1 discography. Minimal mid, lower lumbar degenerative disc change. Mid, lower lumbar bilateral foraminal disc extension. Lower lumbar degenerative facet disease. Left L2-L3 settle posterior disc protrusion with left L2-L3 foraminal disc extension/posterior annular tear which almost abuts the left L2 nerve root. No significant spinal canal or foraminal stenosis. He has large correlate with those noted on the prior exam considering modality differences.  MRI of the thoracic spine 12/03/2018: Midthoracic degenerative disc change. Right posterolateral T8-T9 disc herniation with right anterolateral cord invagination. Chronic superior T11 endplate depression. Patent thoracic canal and neural foramina. No change. Question of a subcentimeter medial right upper left nodule. No abnormalities seen at this side on the prior chest CT 2/17. Finding possibly artifactual. Follow-up noncontrast chest CT may confirm.\par \par   MRI of the lumbar spine 09/02/2017: Minimal mid lumbar dextroscoliosis. Minimal mid lumbar degenerative disc change. No marian disc herniation, spinal canal or foraminal stenosis. No significant change.\par \par X-ray of the right hip taken on 2- FINDINGS: Two views of the right hip demonstrate no acute osseous injury or destructive lesion. Mild degenerative arthritis with subchondral degenerative cyst formation is present in the acetabular roof. There is mild joint space narrowing without joint destruction or femoral head deformity. Visualized portion of the sacrum and right sacroiliac joint appear preserved. Irregular densities over the mid pelvis may represent calcified fibroids, soft tissue granulomas, or fecal artifact. If clinically indicated, this can be further evaluated with pelvic ultrasound study.

## 2023-04-25 NOTE — PHYSICAL EXAM
[de-identified] : NECK - tenderness into the cervical paraspinals. ROM restricted. Pain with extension. Positive facet loading C3-6.\par \par BACK - tenderness into the lumbar paraspinals. ROM restricted. Pain with extension. Positive facet loading L3-S1. \par 
no

## 2023-04-26 ENCOUNTER — APPOINTMENT (OUTPATIENT)
Dept: CARDIOLOGY | Facility: CLINIC | Age: 78
End: 2023-04-26
Payer: MEDICARE

## 2023-04-26 ENCOUNTER — NON-APPOINTMENT (OUTPATIENT)
Age: 78
End: 2023-04-26

## 2023-04-26 VITALS
HEIGHT: 62 IN | WEIGHT: 135 LBS | SYSTOLIC BLOOD PRESSURE: 126 MMHG | HEART RATE: 76 BPM | OXYGEN SATURATION: 96 % | DIASTOLIC BLOOD PRESSURE: 70 MMHG | BODY MASS INDEX: 24.84 KG/M2

## 2023-04-26 DIAGNOSIS — M51.36 OTHER INTERVERTEBRAL DISC DEGENERATION, LUMBAR REGION: ICD-10-CM

## 2023-04-26 PROCEDURE — 99214 OFFICE O/P EST MOD 30 MIN: CPT

## 2023-04-26 NOTE — DISCUSSION/SUMMARY
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   8/20 4 stents RCA Dr Daniels, HLD,HTN, PAF MOHINI vasc 5 ,  DM, lumbar disc disease for clearance and AC management for epidural injections by Pain management\par Pt was for pre op clearance but has had chest pressure with and without exertion lasting 1 min several times associated with dizziness and slight ataxia. No diaphoresis. No palpitations. She is not sure if its coming from her back.Pt had equivocal Stress echo 7/22 had chest pain. Lexiscan 8/22 partial reperfusion inferolat ischemia  area of prior infarct. Dr Marte reviewed and will continue medical management on BB ARB ASA, CCB.  Instructed pt to go to ER for persistent pain. told pt to inform us of increased frequency of pain. Aware we would consider cardiac cath if pain increases .Consider adding Imdur. Pt does not want to take another med for now.  Pt sleeps poorly not sure if snores ,will refer to pulm for sleep eval. She is waiting to do that . She is having cramping in her thighs at least one/ wk . Supportive treatment ie stretching, hydration, heat prior to bed recommended. If no improvement will ck CPK and consider changing or reducing statin dose. Pt is very anxious and upset re not getting her epidural injections due to insurance delay. Anxiety treatment measures recommended deep breathing visual imagery.  Pt had a 3 min episode of  racing heart and SOB  no chest pain when she was very anxious. If any symptoms return  will need evaluation possible holter or MCOT. F/u 3 mth  Order stress and echo nexr visit , Pt with decreased EF consider entresto\par 4/26/23 Pt no longer w palpitations or chest pain. Only complaint is neck and LBP. Awaiting rheum eval. Seeing pain mgmt may need clearance for cervical epidural.  Will need to hold eliquis 3 days prior. Pt does not want to do any cardiac testing now. Will plan echo in July 2023 and Lexiscan in Aug. Labs ordered today.

## 2023-04-26 NOTE — HISTORY OF PRESENT ILLNESS
[Preoperative Visit] : for a medical evaluation prior to surgery [Scheduled Procedure ___] : a [unfilled] [Surgeon Name ___] : surgeon: [unfilled] [Good] : Good [Chest Pain] : chest pain [Urinary Frequency] : urinary frequency [Diabetes] : diabetes [Cardiovascular Disease] : cardiovascular disease [Prior Anesthesia] : Prior anesthesia [FreeTextEntry1] : \par 76 y/o w PMH of CABG 12/05 s/p 4 stents 8/20, HLD, HTN PAF on eliquis, MOHINI vasc 5, DM stable, Severe DDD and cervical disc dse in chronic pain. Pt has h/o elevated PTH and hypothyroid followed by endo, and CKD followed by renal.  Pt with persistent neck pain pain, back is improved. They are ruling out rheum dse.  She no longer has chest pain SOB or  palpitations or racing heart. She continues to take her walks every day.  [Dyspnea] : no dyspnea [Prev Anesthesia Reaction] : no previous anesthesia reaction [Anesthesia Reaction] : no anesthesia reaction [Sudden Death] : no sudden death

## 2023-04-26 NOTE — PHYSICAL EXAM
[General Appearance - Well Developed] : well developed [Normal Appearance] : normal appearance [Normal Conjunctiva] : the conjunctiva exhibited no abnormalities [Normal Oral Mucosa] : normal oral mucosa [No Oral Pallor] : no oral pallor [No Oral Cyanosis] : no oral cyanosis [JVD Elevated _____cm] : JVD elevated [unfilled] ~U cm above clavicle [Normal Jugular Venous V Waves Present] : normal jugular venous V waves present [Respiration, Rhythm And Depth] : normal respiratory rhythm and effort [Exaggerated Use Of Accessory Muscles For Inspiration] : no accessory muscle use [Auscultation Breath Sounds / Voice Sounds] : lungs were clear to auscultation bilaterally [Normal Rate] : normal [Rhythm Regular] : regular [Normal S1] : normal S1 [Normal S2] : normal S2 [No Murmur] : no murmurs heard [2+] : left 2+ [No Pitting Edema] : no pitting edema present [Abdomen Soft] : soft [Abdomen Tenderness] : non-tender [Abdomen Mass (___ Cm)] : no abdominal mass palpated [Abnormal Walk] : normal gait [Gait - Sufficient For Exercise Testing] : the gait was sufficient for exercise testing [Nail Clubbing] : no clubbing of the fingernails [Cyanosis, Localized] : no localized cyanosis [Petechial Hemorrhages (___cm)] : no petechial hemorrhages [Skin Color & Pigmentation] : normal skin color and pigmentation [] : no rash [No Venous Stasis] : no venous stasis [Skin Lesions] : no skin lesions [No Skin Ulcers] : no skin ulcer [No Xanthoma] : no  xanthoma was observed [Oriented To Time, Place, And Person] : oriented to person, place, and time [Affect] : the affect was normal [Mood] : the mood was normal [No Anxiety] : not feeling anxious [S3] : no S3 [S4] : no S4 [Right Carotid Bruit] : no bruit heard over the right carotid [Left Carotid Bruit] : no bruit heard over the left carotid [Right Femoral Bruit] : no bruit heard over the right femoral artery [Left Femoral Bruit] : no bruit heard over the left femoral artery [FreeTextEntry1] : pt walks several times a week. More muscle cramping at least one x /wk

## 2023-04-26 NOTE — REVIEW OF SYSTEMS
[Blood In The Urine] : hematuria [Joint Pain] : joint pain [Negative] : Heme/Lymph [Fever] : no fever [Chills] : no chills [Blurry Vision] : no blurred vision [Earache] : no earache [SOB] : no shortness of breath [Chest Discomfort] : no chest discomfort [Lower Ext Edema] : no extremity edema [Palpitations] : no palpitations [Syncope] : no syncope [Cough] : no cough [Wheezing] : no wheezing [Abdominal Pain] : no abdominal pain [Constipation] : no constipation [Rash] : no rash [Skin Lesions] : no skin lesions [Dizziness] : no dizziness [Weakness] : no weakness [Confusion] : no confusion was observed [FreeTextEntry2] : Sleeps poorly, intermittently  says she snores. Feels tired [FreeTextEntry9] : neck pain worse back better

## 2023-05-02 ENCOUNTER — APPOINTMENT (OUTPATIENT)
Dept: PAIN MANAGEMENT | Facility: CLINIC | Age: 78
End: 2023-05-02
Payer: MEDICARE

## 2023-05-02 PROCEDURE — 94761 N-INVAS EAR/PLS OXIMETRY MLT: CPT

## 2023-05-02 PROCEDURE — 93040 RHYTHM ECG WITH REPORT: CPT | Mod: 79

## 2023-05-02 PROCEDURE — 93770 DETERMINATION VENOUS PRESS: CPT

## 2023-05-02 PROCEDURE — 64490 INJ PARAVERT F JNT C/T 1 LEV: CPT | Mod: 50

## 2023-05-02 PROCEDURE — 64492 INJ PARAVERT F JNT C/T 3 LEV: CPT | Mod: 50

## 2023-05-02 PROCEDURE — 00600 ANES PX CRV SPINE&CORD NOS: CPT | Mod: QZ,P3

## 2023-05-02 PROCEDURE — 64491 INJ PARAVERT F JNT C/T 2 LEV: CPT | Mod: 50

## 2023-05-02 NOTE — PROCEDURE
[FreeTextEntry1] : MEDIAL BRANCH NERVE BLOCK- CERVICAL LEVELS C3-C6  [FreeTextEntry3] : MEDIAL BRANCH NERVE BLOCK- CERVICAL LEVELS C3-C6 \par \par \par Date:  2023\par \par Patient: Sandra Huggins \par \par :  1945\par Preoperative Diagnosis: Bilateral Cervical facet arthropathy C3- C6\par Preoperative Diagnosis: Biltateral Cervical facet arthropathy C3- C6\par Procedure: Diagnostic medial branch nerve block of Bilateral C3-C6  under fluoroscopy\par Physician: Luis Bush M.D.  \par Anesthesiologist/CRNA:  Mr. Peña\par Anesthesia: See nurses note/MAC/ cold spray IV Sedation versed 2mg, \par Medical Necessity: Facet arthropathy; intractable axial pain amenable only to medial branch nerve injections. Criteria met for a medial branch nerve neurotomy.\par Indication for Fluoroscopy:  This procedure requires the precise placement of the spinal needle into the epidural space.  It is the only way to accurately and safely perform the injection.\par \par \par Consent:  Though unusual, the possible complications including infection, bleeding, nerve damage, hospital admission, stroke, pneumothorax, death or failure of the procedure are theoretically possible. The patient was educated about the of the procedure and alternative therapies. All questions were answered and the patient freely gave consent to proceed.\par Prior to the procedure, we discussed the risks of the radiofrequency such as hematoma, infection, and nerve or spinal cord injury as well as dropped head syndrome. The patient was also told that sometimes patients will notice upper and/or lower extremity weakness immediately following the procedure due to extravasation of local anesthetic solution onto the main nerve root during the procedure. In addition, the patient was informed of other possible complications such as phrenic nerve injury, weak/heavy head, or muscle injury.  \par \par \par Monitoring:  Patient had continuous blood pressure, EKG, and pulse oximetry throughout the case. See nurse's notes.\par \par \par \par \par PROCEDURE NOTE:  \par Routine OR consent was signed and procedure risks reviewed with the patient.  The patient was positioned prone on the operating table.  A chloraprep was performed and the area surrounded by sterile drapes.  A time out was performed.  Fluoroscopy unit was positioned over the patient and images of the spine (AP views) obtained.  The entry sites for the above noted levels median branch were determined and local anesthesia with lidocaine 2%-1cc was provided.  At each level a 22guage 3 ½ inch spinal needle was placed at the level of the median branch to the facet under fluoroscopic guidance. This was performed by determining needle position based on aligning the needle point with the waist of each cervical vertebrate under tunnel vision.  A dose of lidocaine 2%, 1cc  \par was administered at each level.  The needles at each level were withdrawn following administration of the medication.  There was no significant bleeding at the site.  A dressing was placed by the assistant.\par \par \par \par \par Findings: Cervical spine AP and oblique views with x-ray degenerative changes noted.\par \par \par Complications: none.  \par \par Disposition: I have examined the patient and there are no new physical findings since the original presentation.  Sensory and motor function were intact. The patient met discharge criteria see nurses notes. The discharge instruction sheet was reviewed and given to the patient. The patient was discharged home with a .\par \par Comment: If at least 70% relief or 50% greater than 24 hours,  would proceed to RFA. If 50% relief is less than 24 hours, would repeat to confirm for RFA. If less than 50% relief, assess for other pain generators. Call if any problems\par \par Indication for RFA: The pt had a positive response to medial branch diagnostic injections and is considered a good candidate for the thermal RF ablation procedure. The diagnostic injection provided at least 50% reduction in pain for the duration of the local anesthetic. \par The following criteria have been met: 1) failed response to at least 3 months of conservative management; 2) patient has neck pain that is non-radicular, suggesting facet joint origin supported by absence of nerve root compression documented on the medical record on H&P and radiographic evaluation; 3) minimum of 6 months has elapsed since any prior RF treatments. If prior ablation therapy has been performed, it provided at least 50% relief for minimum of 10-12 weeks; 4) no prior spinal fusion at the vertebral level being treated;\par \par \par \par \par This document was signed by:\par \par Luis uBsh MD  \par Board Certified, Anesthesiology  \par Board Certified, Pain Medicine  \par \par \par

## 2023-05-03 ENCOUNTER — APPOINTMENT (OUTPATIENT)
Dept: PAIN MANAGEMENT | Facility: CLINIC | Age: 78
End: 2023-05-03
Payer: MEDICARE

## 2023-05-03 PROCEDURE — 72100 X-RAY EXAM L-S SPINE 2/3 VWS: CPT

## 2023-05-03 PROCEDURE — 64495 INJ PARAVERT F JNT L/S 3 LEV: CPT | Mod: 50

## 2023-05-03 PROCEDURE — 00630 ANES PX LUMBAR REGION NOS: CPT | Mod: QZ,P3

## 2023-05-03 PROCEDURE — 93770 DETERMINATION VENOUS PRESS: CPT | Mod: 59

## 2023-05-03 PROCEDURE — 93040 RHYTHM ECG WITH REPORT: CPT | Mod: 59

## 2023-05-03 PROCEDURE — 64493 INJ PARAVERT F JNT L/S 1 LEV: CPT | Mod: 50

## 2023-05-03 PROCEDURE — 64494 INJ PARAVERT F JNT L/S 2 LEV: CPT | Mod: 50

## 2023-05-03 PROCEDURE — 94761 N-INVAS EAR/PLS OXIMETRY MLT: CPT | Mod: 59

## 2023-05-03 NOTE — PROCEDURE
[FreeTextEntry1] : LUMBAR MEDIAL BRANCH INJECTION UNDER FLUOROSCOPY [FreeTextEntry3] : Date:  2023\par \par Patient: MALIHA LOZA\par \par :  1945\par \par \par \par \par \par Preoperative Diagnosis: Lumbar spondylosis; facet arthropathy Bilateral L3-4, L4-5, L5-S1\par Postoperative Diagnosis: Same\par Procedure:\par                    1. Diagnostic Lumbar medial branch nerve injection Bilateral L3-4, L4-5, L5-S1\par                    2. Fluoroscopic guidance and localization of needle\par \par \par Physician: Luis Bush M.D.\par  Anesthesiologist- Ms. Molina \par Anesthesia: See nurses notes, Mac, Versed 2mg\par Medical Necessity:  Failure of conservative management.\par \par \par \par Consent:  Though unusual, the possible complications including infection, bleeding, nerve damage, hospital admission, stroke, pneumothorax, death or failure of the procedure are theoretically possible. The patient was educated about the of the procedure and alternative therapies. All questions were answered and the patient freely gave consent to proceed.\par Indication for Fluoroscopy:  This procedure requires the precise placement of the spinal needle into the epidural space.  It is the only way to accurately and safely perform the injection.\par \par \par \par Procedure Note: \par After obtaining written consent, the patient was placed on the fluoroscopic table in the prone position with the pillow placed under the hips. A betadine prep was performed and the area surrounded by sterile drapes. A time out was performed.  Fluoroscopy unit was positioned over the patient and images of the spine (AP and oblique views) obtained.  The entry sites for the above noted levels median branch were determined and local anesthesia with lidocaine 2%-1cc was provided. At each level a 22guage 3 ½ inch spinal needle was placed at the level of the median branch to the facet under fluoroscopic guidance.  A dose of Lidocaine 2% 0.5cc was administered at each level the needles at each level were withdrawn following administration of the medication.  There was no significant bleeding at the site.  A dressing was placed by the assistant nurse.\par \par \par \par Findings: Lumbar spine AP and oblique views with x-ray degenerative changes noted.\par \par \par Complications: none. \par \par Disposition: : I have examined the patient and there are no new physical findings since the original presentation.  Sensory and motor function were intact. The patient met discharge criteria see nurses notes. The discharge instruction sheet was reviewed and given to the patient. The patient was discharged home with a .\par \par Comment: Immediate relief today : 90%. If at least 70% relief or 50% greater than 24 hours, would proceed to RFA. If 50% relief is less than 24 hours, would repeat to confirm for RFA. If less than 50% relief, assess for other pain generators. Call if any problems\par \par Indication for RFA: The pt had a positive response to medial branch diagnostic injections and is considered a good candidate for the thermal RF ablation procedure. The diagnostic injection provided at least 50% reduction in pain for the duration of the local anesthetic.\par \par \par \par The following criteria have been met: 1) failed response to at least 3 months of conservative management; 2) patient has LBP that is non-radicular, suggesting facet joint origin supported by absence of nerve root compression documented on the medical record on H&P and radiographic evaluation; 3) minimum of 6 months has elapsed since any prior RF treatments. If prior ablation therapy has been performed, it provided at least 50% relief for minimum of 10-12 weeks; 4) no prior spinal fusion at the vertebral level being treated;\par \par \par \par \par \par Luis Bush MD \par Board Certified, Anesthesiology \par Board Certified, Pain Medicine \par \par

## 2023-05-08 ENCOUNTER — RX RENEWAL (OUTPATIENT)
Age: 78
End: 2023-05-08

## 2023-05-18 ENCOUNTER — APPOINTMENT (OUTPATIENT)
Dept: PAIN MANAGEMENT | Facility: CLINIC | Age: 78
End: 2023-05-18
Payer: MEDICARE

## 2023-05-18 VITALS — WEIGHT: 135 LBS | BODY MASS INDEX: 24.84 KG/M2 | HEIGHT: 62 IN

## 2023-05-18 DIAGNOSIS — M47.812 SPONDYLOSIS W/OUT MYELOPATHY OR RADICULOPATHY, CERVICAL REGION: ICD-10-CM

## 2023-05-18 DIAGNOSIS — M47.816 SPONDYLOSIS W/OUT MYELOPATHY OR RADICULOPATHY, LUMBAR REGION: ICD-10-CM

## 2023-05-18 PROCEDURE — 99214 OFFICE O/P EST MOD 30 MIN: CPT

## 2023-05-18 NOTE — ASSESSMENT
[FreeTextEntry1] : 77-year-old female presenting status post bilateral C3-C6 medial branch block along with a bilateral L3-S1 medial branch block.  Both of these injections provided her with approximately 90% relief for 3 days.  She had improvement in pain along with increase in function as well as better ability to sleep and walk.  Currently, she states her lower back and cervical pain is returning.  I will now proceed with a left and right C3-6 radiofrequency ablation with sedation followed by left and right L3-S1 radiofrequency ablation with sedation.  She will continue with her tramadol as needed for severe pain.  She will follow-up in 6 weeks for reassessment.\par \par Risk, benefits, pros and cons of procedure were explained to the patient using models and diagrams and their questions were answered. \par \par \par The patient has severe anxiety of procedures that necessitates monitored anesthesia care (MAC). The procedure performed will be close to major nerves, arteries, and spinal cord and/or joint structures. Due to the proximity of these structures, we need the patient to be still during the procedure.  With the help of MAC, this will be safely achieved and decrease the risk of any complications.\par  \par \par Overall there is at least a 30-50% reduction in pain with the prescribed analgesics. The patient denies any adverse side effects due to the medication (sleeping disturbance, constipation, sleepiness, hallucinations and/or urination problems).\par \par Entered by Emilie Benitez, acting as scribe for Dr. Bush.\par  \par The documentation recorded by the scribe, in my presence, accurately reflects the service I personally performed, and the decisions made by me with my edits as appropriate.\par  \par Best Regards, \par Luis Bush MD \par Board Certified, Anesthesiology \par Board Certified, Pain Medicine\par  \par

## 2023-05-18 NOTE — HISTORY OF PRESENT ILLNESS
[FreeTextEntry1] : HISTORY OF PRESENT ILLNESS: Ms. Huggins is a 77 year old female status post Bilateral L4-5 Transforaminal Epidural Steroid Injection done on 5-. She states that her radicular symptoms are well controlled. Today, she states her pain is mainly axial. She states that this pain is present all throughout the day, being a 9/10 on the pain scale. She states that this pain is located in her lumbar spine, centered very low. She describes this pain as sharp and pressure like. She states that this pain gets worse in the evening. She states that due to this pain she is unable to perform household chores.  Of note, She states that her thoracic and cervical pain and under control. 3. \par \par TODAY: Revisit encounter.\par \par As for her lower back pain, she underwent a bilateral L3- S1 medial branch block on 5-3-2023 With approximately 90% relief for 3 days.  She noticed improvement in pain along with increase in function and better ability to walk.  Currently, she states her lower back pain is returning.  She currently rates the pain as an 8/10 on the pain scale.  She states the pain is mainly across the lower back without no radicular pain.  She states she has severe stiffness and spasms in the area.\par \par As for her neck pain, she underwent a bilateral C3-6 medial branch block on 5-2-2023 With approximately 90% relief for 3 days.  She noticed improvement in pain along with increase in function and better ability to sleep.  Currently, she states her neck pain is returning.  She currently rates the pain as an 8/10 on the pain scale.  She states the pain is constant in nature.  She has pain which mainly across the cervical spine without any radicular component.  She states there is severe stiffness and spasms in the area.\par \par \par She is currently utilizing gabapentin along with tramadol with approximately 30-40% improvement in pain and as well as increase in function.  She denies any side effects.\par \par \par

## 2023-05-18 NOTE — PHYSICAL EXAM
[de-identified] : NECK - tenderness into the cervical paraspinals. ROM restricted. Pain with extension. Positive facet loading C3-6.\par \par BACK - tenderness into the lumbar paraspinals. ROM restricted. Pain with extension. Positive facet loading L3-S1. \par

## 2023-05-23 ENCOUNTER — APPOINTMENT (OUTPATIENT)
Dept: PAIN MANAGEMENT | Facility: CLINIC | Age: 78
End: 2023-05-23
Payer: MEDICARE

## 2023-05-23 PROCEDURE — 93770 DETERMINATION VENOUS PRESS: CPT | Mod: 59

## 2023-05-23 PROCEDURE — 64633 DESTROY CERV/THOR FACET JNT: CPT | Mod: RT

## 2023-05-23 PROCEDURE — 93040 RHYTHM ECG WITH REPORT: CPT | Mod: 59

## 2023-05-23 PROCEDURE — 00600 ANES PX CRV SPINE&CORD NOS: CPT | Mod: QZ,P3

## 2023-05-23 PROCEDURE — 94761 N-INVAS EAR/PLS OXIMETRY MLT: CPT

## 2023-05-23 PROCEDURE — 72040 X-RAY EXAM NECK SPINE 2-3 VW: CPT

## 2023-05-23 PROCEDURE — 64634Z: CUSTOM | Mod: RT

## 2023-05-23 NOTE — PROCEDURE
[FreeTextEntry1] : MEDIAL BRANCH NERVE RHIZOTOMY- CERVICAL LEVELS   [FreeTextEntry3] : Date:  2023\par \par Patient: MALIHA LOZA\par \par :  1945\par \par \par \par \par Patient Type: Established\par Encounter Type: Continuing Active.\par \par \par Preoperative Diagnosis: Right  Cervical facet arthropathy C3- C6\par Preoperative Diagnosis: Right  Cervical facet arthropathy C3- C6\par Procedure: Neurotomy of the medial branch nerve of Right  C3-C6 under fluoroscopy\par Physician: Luis Bush M.D. \par Anesthesiologist/CRNA: Mr. Peña\par Anesthesia: MAC, Versed 2mg Fent 100mcg\par \par \par Medical Necessity: Facet arthropathy; intractable axial pain amenable only to medial branch nerve injections. Criteria met for a medial branch nerve neurotomy.\par Indication for Fluoroscopy:  This procedure requires the precise placement of the spinal needle into the epidural space.  It is the only way to accurately and safely perform the injection.\par \par \par Consent:  Though unusual, the possible complications including infection, bleeding, nerve damage, hospital admission, stroke, pneumothorax, death or failure of the procedure are theoretically possible. The patient was educated about the of the procedure and alternative therapies. All questions were answered and the patient freely gave consent to proceed.\par Prior to the procedure, we discussed the risks of the radiofrequency such as hematoma, infection, and nerve or spinal cord injury as well as dropped head syndrome. The patient was also told that sometimes patients will notice upper and/or lower extremity weakness immediately following the procedure due to extravasation of local anesthetic solution onto the main nerve root during the procedure. In addition, the patient was informed of other possible complications such as phrenic nerve injury, weak/heavy head, or muscle injury.  Lastly, the patient was informed that 1 to 2 weeks of perioperative discomfort following the procedure is to be expected.\par \par \par Monitoring:  Patient had continuous blood pressure, EKG, and pulse oxime\par \par try throughout the case. See nurse's notes.\par \par \par \par PROCEDURE NOTE:\par \par The patient was placed in a prone position on a fluoroscopy table.  The back was prepped and draped in a sterile fashion and a C-arm fluoroscopic device was used for localization of the radiofrequency target sites. A time out was performed.  The [place]  mm disposable Carlitos*-coated cannula with a 10 mm active tip was adjusted via the sizing collar so that the electrode fit just inside the inner bevel at the end of the bare metal. Prior to beginning the procedure, the internal test mode function of the radiofrequency unit was checked to make sure the machine was functioning properly. The initial target zones included the waists of the vertebral bodies. At each target site, a 1% Lidocaine solution was used to anesthetize the skin and subcutaneous tissues and the radiofrequency cannulas were placed in a parallel fashion to the x-ray beam and directed in a bulls-eye fashion down to the target zones. The cannula was then walked over the leading edge and advanced approximately 2 to 3 mm in an anterior direction. Sensory stimulation at 50 hertz was performed at each target area. Referral of the sensory stimulation was noted at less than 1 volt over the paravertebral area. Motor dissociation at 2 hertz was obtained by stimulating up to 3 times the voltage of the sensory stimulation and insuring a lack of motor movement in the upper extremities. Once the radiofrequency cannula were in correct position, a 2% Lidocaine solution was used to rapidly anesthetize the lesion site. After a thirty second delay, thermal lesions were then created at each level for 60 seconds at a temperature of 80¿ C. After the lesions were created, the cannulas were removed and sterile bandages are placed at the puncture sites. The patient tolerated the procedure well. \par \par \par Findings: Cervical spine AP and oblique views with x-ray degenerative changes noted.\par \par \par \par Complications: none. \par \par \par \par Disposition: I have examined the patient and there are no new physical findings since the original presentation.  Sensory and motor function were intact. The patient met discharge criteria see nurses notes. The discharge instruction sheet was reviewed and given to the patient. The patient was discharged home with a .\par \par \par \par Comment: RFA today, follow up in 2-4 weeks. Call if any problem.\par \par \par Luis Bush MD \par Board Certified, Anesthesiology \par Board Certified, Pain Medicine\par \par

## 2023-05-24 ENCOUNTER — APPOINTMENT (OUTPATIENT)
Dept: PAIN MANAGEMENT | Facility: CLINIC | Age: 78
End: 2023-05-24
Payer: MEDICARE

## 2023-05-24 PROCEDURE — 93770 DETERMINATION VENOUS PRESS: CPT | Mod: 59

## 2023-05-24 PROCEDURE — 72040 X-RAY EXAM NECK SPINE 2-3 VW: CPT

## 2023-05-24 PROCEDURE — 64633 DESTROY CERV/THOR FACET JNT: CPT | Mod: LT

## 2023-05-24 PROCEDURE — 99152Z: CUSTOM

## 2023-05-24 PROCEDURE — 64634Z: CUSTOM | Mod: LT

## 2023-05-24 PROCEDURE — 93040 RHYTHM ECG WITH REPORT: CPT | Mod: 59,59

## 2023-05-24 NOTE — PROCEDURE
[FreeTextEntry1] : MEDIAL BRANCH NERVE RHIZOTOMY- CERVICAL LEVELS   [FreeTextEntry3] : Date:  2023\par \par Patient: MALIHA LOZA\par \par :  1945\par \par \par \par \par Patient Type: Established\par Encounter Type: Continuing Active.\par \par \par Preoperative Diagnosis: Left Cervical facet arthropathy C3- C6\par Preoperative Diagnosis: Left Cervical facet arthropathy C3- C6\par Procedure: Neurotomy of the medial branch nerve of Left C3-C6  under fluoroscopy\par Physician: Luis Bush M.D. \par \par Anesthesia: See nurses notes, IV sedation, Versed 1mg\par \par \par Medical Necessity: Facet arthropathy; intractable axial pain amenable only to medial branch nerve injections. Criteria met for a medial branch nerve neurotomy.\par Indication for Fluoroscopy:  This procedure requires the precise placement of the spinal needle into the epidural space.  It is the only way to accurately and safely perform the injection.\par \par \par Consent:  Though unusual, the possible complications including infection, bleeding, nerve damage, hospital admission, stroke, pneumothorax, death or failure of the procedure are theoretically possible. The patient was educated about the of the procedure and alternative therapies. All questions were answered and the patient freely gave consent to proceed.\par Prior to the procedure, we discussed the risks of the radiofrequency such as hematoma, infection, and nerve or spinal cord injury as well as dropped head syndrome. The patient was also told that sometimes patients will notice upper and/or lower extremity weakness immediately following the procedure due to extravasation of local anesthetic solution onto the main nerve root during the procedure. In addition, the patient was informed of other possible complications such as phrenic nerve injury, weak/heavy head, or muscle injury.  Lastly, the patient was informed that 1 to 2 weeks of perioperative discomfort following the procedure is to be expected.\par \par \par Monitoring:  Patient had continuous blood pressure, EKG, and pulse oxime\par \par try throughout the case. See nurse's notes.\par \par \par \par PROCEDURE NOTE:\par \par The patient was placed in a prone position on a fluoroscopy table.  The back was prepped and draped in a sterile fashion and a C-arm fluoroscopic device was used for localization of the radiofrequency target sites. A time out was performed.  The [employer]  mm disposable Carlitos*-coated cannula with a 10 mm active tip was adjusted via the sizing collar so that the electrode fit just inside the inner bevel at the end of the bare metal. Prior to beginning the procedure, the internal test mode function of the radiofrequency unit was checked to make sure the machine was functioning properly. The initial target zones included the waists of the vertebral bodies. At each target site, a 1% Lidocaine solution was used to anesthetize the skin and subcutaneous tissues and the radiofrequency cannulas were placed in a parallel fashion to the x-ray beam and directed in a bulls-eye fashion down to the target zones. The cannula was then walked over the leading edge and advanced approximately 2 to 3 mm in an anterior direction. Sensory stimulation at 50 hertz was performed at each target area. Referral of the sensory stimulation was noted at less than 1 volt over the paravertebral area. Motor dissociation at 2 hertz was obtained by stimulating up to 3 times the voltage of the sensory stimulation and insuring a lack of motor movement in the upper extremities. Once the radiofrequency cannula were in correct position, a 2% Lidocaine solution was used to rapidly anesthetize the lesion site. After a thirty second delay, thermal lesions were then created at each level for 60 seconds at a temperature of 80¿ C. After the lesions were created, the cannulas were removed and sterile bandages are placed at the puncture sites. The patient tolerated the procedure well. \par \par \par Findings: Cervical spine AP and oblique views with x-ray degenerative changes noted.\par \par \par \par Complications: none. \par \par \par \par Disposition: I have examined the patient and there are no new physical findings since the original presentation.  Sensory and motor function were intact. The patient met discharge criteria see nurses notes. The discharge instruction sheet was reviewed and given to the patient. The patient was discharged home with a .\par \par \par \par Comment: RFA today, follow up in 2-4 weeks. Call if any problem.\par \par \par Luis Bush MD \par Board Certified, Anesthesiology \par Board Certified, Pain Medicine\par \par

## 2023-05-30 ENCOUNTER — APPOINTMENT (OUTPATIENT)
Dept: PAIN MANAGEMENT | Facility: CLINIC | Age: 78
End: 2023-05-30
Payer: MEDICARE

## 2023-05-30 PROCEDURE — 00630 ANES PX LUMBAR REGION NOS: CPT | Mod: QZ,P3

## 2023-05-30 PROCEDURE — 64635 DESTROY LUMB/SAC FACET JNT: CPT | Mod: 79,LT

## 2023-05-30 PROCEDURE — 64636Z: CUSTOM | Mod: 79,LT

## 2023-05-30 PROCEDURE — 72100 X-RAY EXAM L-S SPINE 2/3 VWS: CPT

## 2023-05-30 PROCEDURE — 94761 N-INVAS EAR/PLS OXIMETRY MLT: CPT

## 2023-05-30 PROCEDURE — 93770 DETERMINATION VENOUS PRESS: CPT | Mod: 59

## 2023-05-30 PROCEDURE — 93040 RHYTHM ECG WITH REPORT: CPT | Mod: 59

## 2023-05-30 NOTE — PROCEDURE
[FreeTextEntry1] : MEDIAL BRANCH NERVE RHIZOTOMY- LUMBAR  LEVELS [FreeTextEntry3] : Date:  2023\par \par Patient: MALIHA LOZA\par \par :  1945\par \par \par \par \par Preoperative Diagnosis: Lumbar facet arthropathy L3-4, L4-5, L5-S1\par Postoperative Diagnosis: Same\par Procedure: Neurotomy of the medial branch nerve of left  L3-4, L4-5, L5-S1\par Physician: Luis Bush M.D. \par Anesthesiologist/CRNA: Mr. Peña\par Anesthesia: MAC, Versed 2mg, Fentanyl 100 mcg\par Medical Necessity: Facet arthropathy; intractable axial pain amenable only to medial branch nerve injections. Criteria met for a medial branch nerve neurotomy.\par Indication for Fluoroscopy:  This procedure requires the precise placement of the spinal needle into the epidural space.  It is the only way to accurately and safely perform the injection.\par \par \par \par Consent:  Though unusual, the possible complications including infection, bleeding, nerve damage, hospital admission, stroke, pneumothorax, death or failure of the procedure are theoretically possible. The patient was educated about the of the procedure and alternative therapies. All questions were answered and the patient freely gave consent to proceed.\par Prior to the procedure, we discussed the risks of the radiofrequency such as hematoma, infection, and nerve or spinal cord injury. The patient was also told that sometimes patients will notice lower extremity weakness immediately following the procedure due to extravasation of local anesthetic solution onto the main nerve root during the procedure. In addition, the patient was informed that 1 to 2 weeks of perioperative discomfort following the procedure is to be expected.\par \par \par \par Monitoring:  Patient had continuous blood pressure, EKG, and pulse oximetry throughout the case. See nurse's notes.\par \par \par PROCEDURE NOTE:\par The patient was placed in a prone position on a fluoroscopy table.  The back was prepped and draped in a sterile fashion and a C-arm fluoroscopic device was used for localization of the radiofrequency target sites. A time out was performed. The [employer] 100 mm disposable Carlitos*-coated cannula with a 10 mm active tip was adjusted via the sizing collar so that the electrode fit just inside the inner bevel at the end of the bare metal. Prior to beginning the procedure, the internal test mode function of the radiofrequency unit was checked to make sure the machine was functioning properly. The initial target zones included the junction of the transverse process and the pedicle at each level.\par \par At each target site, the radiofrequency cannula were placed in a parallel fashion to the x-ray beam and directed in a bulls-eye fashion down to the target zones. The cannula was then walked over the leading edge and advanced approximately 2 to 3 mm in an anterior direction.\par \par The second target zones included the superior and medial junction of the sacral ala. At each target site, a 2% Lidocaine solution was used to anesthetize the skin and subcutaneous tissues. Again, the radiofrequency cannulas were placed in a parallel fashion to the x-ray beam and directed in a bulls-eye fashion down to the target zones. The cannula was then walked over the leading edge and advanced approximately 2 to 3 mm in an anterior direction. A lateral view of the spine was performed to insure that the cannulas were not too far to the opening of the foraminal canals. Sensory stimulation at 50 hertz was performed at each target area. Referral of the sensory stimulation was noted at less than 1 volt over the paravertebral area or hip. Motor dissociation at 2 hertz was obtained by stimulating up to 3 times the voltage of the sensory stimulation and insuring a lack of motor movement in the lower extremities. Once the radiofrequency cannula was in correct position, a 2% Lidocaine with 10mg of dexamethasone solution was used to rapidly anesthetize the lesion site. After a thirty second delay, thermal lesions were then created at each level for 90 seconds at a temperature of 80¿ C. After the lesions were created, the cannulas were removed and sterile bandages placed at the puncture sites. The patient tolerated the procedure well.\par \par \par \par Findings: Lumbar spine AP and oblique views with x-ray degenerative changes noted.\par \par Complications: None. The patient tolerated the procedure well.\par \par Disposition: I have examined the patient and there are no new physical findings since the original presentation.  Sensory and motor function were intact. The patient met discharge criteria see nurses' notes. The discharge instruction sheet was reviewed and given to the patient. The patient was discharged home with a . \par .\par \par Comment: RFA today, follow up in 2-4 weeks. Call if any problem.\par \par Indication for RFA: The pt had a positive response to medial branch diagnostic injections and is considered a good candidate for the thermal RF ablation procedure. The diagnostic injection provided at least 50% reduction in pain for the duration of the local anesthetic. \par The following criteria have been met: 1) failed response to at least 3 months of conservative management; 2) patient has LBP that is non-radicular, suggesting facet joint origin supported by absence of nerve root compression documented on the medical record on H&P and radiographic evaluation; 3) minimum of 6 months has elapsed since any prior RF treatments. If prior ablation therapy has been performed, it provided at least 50% relief for minimum of 10-12 weeks; 4) no prior spinal fusion at the vertebral level being treated;\par \par \par \par \par \par Luis Bush MD \par Board Certified, Anesthesiology \par Board Certified, Pain Medicine  \par

## 2023-06-01 ENCOUNTER — APPOINTMENT (OUTPATIENT)
Dept: PAIN MANAGEMENT | Facility: CLINIC | Age: 78
End: 2023-06-01
Payer: MEDICARE

## 2023-06-01 PROCEDURE — 93770 DETERMINATION VENOUS PRESS: CPT

## 2023-06-01 PROCEDURE — 00630 ANES PX LUMBAR REGION NOS: CPT | Mod: QZ,P3

## 2023-06-01 PROCEDURE — 64636Z: CUSTOM | Mod: NC

## 2023-06-01 PROCEDURE — 93040 RHYTHM ECG WITH REPORT: CPT | Mod: 59

## 2023-06-01 PROCEDURE — 72100 X-RAY EXAM L-S SPINE 2/3 VWS: CPT

## 2023-06-01 PROCEDURE — 94761 N-INVAS EAR/PLS OXIMETRY MLT: CPT

## 2023-06-01 PROCEDURE — 64635 DESTROY LUMB/SAC FACET JNT: CPT | Mod: RT,79

## 2023-06-01 NOTE — PROCEDURE
[FreeTextEntry1] : MEDIAL BRANCH NERVE RHIZOTOMY- LUMBAR  LEVELS [FreeTextEntry3] : Date:  2023\par \par Patient: MALIHA LOZA\par \par :  1945\par \par \par \par \par Preoperative Diagnosis: Lumbar facet arthropathy L3-4, L4-5, L5-S1\par Postoperative Diagnosis: Same\par Procedure: Neurotomy of the medial branch nerve of right L3-4, L4-5, L5-S1\par Physician: Luis Bush M.D. \par Anesthesiologist/CRNA: Ms. Molina\par Anesthesia: MAC, versed 2mg, Fentanyl 50 mcg\par Medical Necessity: Facet arthropathy; intractable axial pain amenable only to medial branch nerve injections. Criteria met for a medial branch nerve neurotomy.\par Indication for Fluoroscopy:  This procedure requires the precise placement of the spinal needle into the epidural space.  It is the only way to accurately and safely perform the injection.\par \par \par \par Consent:  Though unusual, the possible complications including infection, bleeding, nerve damage, hospital admission, stroke, pneumothorax, death or failure of the procedure are theoretically possible. The patient was educated about the of the procedure and alternative therapies. All questions were answered and the patient freely gave consent to proceed.\par Prior to the procedure, we discussed the risks of the radiofrequency such as hematoma, infection, and nerve or spinal cord injury. The patient was also told that sometimes patients will notice lower extremity weakness immediately following the procedure due to extravasation of local anesthetic solution onto the main nerve root during the procedure. In addition, the patient was informed that 1 to 2 weeks of perioperative discomfort following the procedure is to be expected.\par \par \par \par Monitoring:  Patient had continuous blood pressure, EKG, and pulse oximetry throughout the case. See nurse's notes.\par \par \par PROCEDURE NOTE:\par The patient was placed in a prone position on a fluoroscopy table.  The back was prepped and draped in a sterile fashion and a C-arm fluoroscopic device was used for localization of the radiofrequency target sites. A time out was performed. The [employer] 100 mm disposable Carlitos*-coated cannula with a 10 mm active tip was adjusted via the sizing collar so that the electrode fit just inside the inner bevel at the end of the bare metal. Prior to beginning the procedure, the internal test mode function of the radiofrequency unit was checked to make sure the machine was functioning properly. The initial target zones included the junction of the transverse process and the pedicle at each level.\par \par At each target site, the radiofrequency cannula were placed in a parallel fashion to the x-ray beam and directed in a bulls-eye fashion down to the target zones. The cannula was then walked over the leading edge and advanced approximately 2 to 3 mm in an anterior direction.\par \par The second target zones included the superior and medial junction of the sacral ala. At each target site, a 2% Lidocaine solution was used to anesthetize the skin and subcutaneous tissues. Again, the radiofrequency cannulas were placed in a parallel fashion to the x-ray beam and directed in a bulls-eye fashion down to the target zones. The cannula was then walked over the leading edge and advanced approximately 2 to 3 mm in an anterior direction. A lateral view of the spine was performed to insure that the cannulas were not too far to the opening of the foraminal canals. Sensory stimulation at 50 hertz was performed at each target area. Referral of the sensory stimulation was noted at less than 1 volt over the paravertebral area or hip. Motor dissociation at 2 hertz was obtained by stimulating up to 3 times the voltage of the sensory stimulation and insuring a lack of motor movement in the lower extremities. Once the radiofrequency cannula was in correct position, a 2% Lidocaine with 10mg of dexamethasone solution was used to rapidly anesthetize the lesion site. After a thirty second delay, thermal lesions were then created at each level for 90 seconds at a temperature of 80¿ C. After the lesions were created, the cannulas were removed and sterile bandages placed at the puncture sites. The patient tolerated the procedure well.\par \par \par \par Findings: Lumbar spine AP and oblique views with x-ray degenerative changes noted.\par \par Complications: None. The patient tolerated the procedure well.\par \par Disposition: I have examined the patient and there are no new physical findings since the original presentation.  Sensory and motor function were intact. The patient met discharge criteria see nurses' notes. The discharge instruction sheet was reviewed and given to the patient. The patient was discharged home with a . \par .\par \par Comment: RFA today, follow up in 2-4 weeks. Call if any problem.\par \par Indication for RFA: The pt had a positive response to medial branch diagnostic injections and is considered a good candidate for the thermal RF ablation procedure. The diagnostic injection provided at least 50% reduction in pain for the duration of the local anesthetic. \par The following criteria have been met: 1) failed response to at least 3 months of conservative management; 2) patient has LBP that is non-radicular, suggesting facet joint origin supported by absence of nerve root compression documented on the medical record on H&P and radiographic evaluation; 3) minimum of 6 months has elapsed since any prior RF treatments. If prior ablation therapy has been performed, it provided at least 50% relief for minimum of 10-12 weeks; 4) no prior spinal fusion at the vertebral level being treated;\par \par \par \par \par \par Luis Bush MD \par Board Certified, Anesthesiology \par Board Certified, Pain Medicine  \par

## 2023-06-19 ENCOUNTER — RX RENEWAL (OUTPATIENT)
Age: 78
End: 2023-06-19

## 2023-06-21 ENCOUNTER — APPOINTMENT (OUTPATIENT)
Dept: PAIN MANAGEMENT | Facility: CLINIC | Age: 78
End: 2023-06-21

## 2023-07-03 ENCOUNTER — APPOINTMENT (OUTPATIENT)
Dept: ORTHOPEDIC SURGERY | Facility: CLINIC | Age: 78
End: 2023-07-03

## 2023-07-11 ENCOUNTER — APPOINTMENT (OUTPATIENT)
Dept: ORTHOPEDIC SURGERY | Facility: CLINIC | Age: 78
End: 2023-07-11

## 2023-07-24 PROBLEM — I25.9 CARDIAC ISCHEMIA: Status: ACTIVE | Noted: 2023-07-24

## 2023-07-26 ENCOUNTER — APPOINTMENT (OUTPATIENT)
Dept: CARDIOLOGY | Facility: CLINIC | Age: 78
End: 2023-07-26
Payer: MEDICARE

## 2023-07-26 VITALS
WEIGHT: 128 LBS | HEIGHT: 62 IN | OXYGEN SATURATION: 98 % | BODY MASS INDEX: 23.55 KG/M2 | SYSTOLIC BLOOD PRESSURE: 116 MMHG | DIASTOLIC BLOOD PRESSURE: 73 MMHG | HEART RATE: 74 BPM

## 2023-07-26 DIAGNOSIS — I25.9 CHRONIC ISCHEMIC HEART DISEASE, UNSPECIFIED: ICD-10-CM

## 2023-07-26 PROCEDURE — 99214 OFFICE O/P EST MOD 30 MIN: CPT

## 2023-07-26 RX ORDER — HYDROCORTISONE 25 MG/G
2.5 CREAM TOPICAL
Qty: 30 | Refills: 0 | Status: DISCONTINUED | COMMUNITY
Start: 2021-08-26 | End: 2023-07-26

## 2023-07-27 NOTE — DISCUSSION/SUMMARY
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   8/20 4 stents RCA Dr Daniels, HLD,HTN, PAF MOHINI vasc 5 ,  DM, lumbar disc disease for clearance and AC management for epidural injections by Pain management\par Pt was for pre op clearance but has had chest pressure with and without exertion lasting 1 min several times associated with dizziness and slight ataxia. No diaphoresis. No palpitations. She is not sure if its coming from her back.Pt had equivocal Stress echo 7/22 had chest pain. Lexiscan 8/22 partial reperfusion inferolat ischemia  area of prior infarct. Dr Marte reviewed and will continue medical management on BB ARB ASA, CCB.  Instructed pt to go to ER for persistent pain. told pt to inform us of increased frequency of pain. Aware we would consider cardiac cath if pain increases .Consider adding Imdur. Pt does not want to take another med for now.  Pt sleeps poorly not sure if snores ,will refer to pulm for sleep eval. She is waiting to do that . She is having cramping in her thighs at least one/ wk . Supportive treatment ie stretching, hydration, heat prior to bed recommended. If no improvement will ck CPK and consider changing or reducing statin dose. Pt is very anxious and upset re not getting her epidural injections due to insurance delay. Anxiety treatment measures recommended deep breathing visual imagery.  Pt had a 3 min episode of  racing heart and SOB  no chest pain when she was very anxious. If any symptoms return  will need evaluation possible holter or MCOT. F/u 3 mth  Order stress and echo nexr visit , Pt with decreased EF consider entresto\par 4/26/23 Pt no longer w palpitations or chest pain. Only complaint is neck and LBP. Awaiting rheum eval. Seeing pain mgmt may need clearance for cervical epidural.  Will need to hold eliquis 3 days prior. Pt does not want to do any cardiac testing now. Will plan echo in July 2023 and Lexiscan in Aug. Labs ordered today.\par 7/26/23 Pain in neck and back better. NO chest pain.pt needs echo and lexiscan. She had positive stress test last year and needs f/u exam.  Pt needs sleep study has typical JAMILA s/s.She said she cant do test now but will in near future. Risks of JAMILA untreated discussed\par Pt will remain on current dose of potassium 20meq as level is still borderline low.

## 2023-07-27 NOTE — REVIEW OF SYSTEMS
[Blood In The Urine] : hematuria [Joint Pain] : joint pain [Negative] : Heme/Lymph [Weight Loss (___ Lbs)] : [unfilled] ~Ulb weight loss [Fever] : no fever [Chills] : no chills [Blurry Vision] : no blurred vision [Earache] : no earache [SOB] : no shortness of breath [Chest Discomfort] : no chest discomfort [Lower Ext Edema] : no extremity edema [Palpitations] : no palpitations [Syncope] : no syncope [Cough] : no cough [Wheezing] : no wheezing [Abdominal Pain] : no abdominal pain [Constipation] : no constipation [Rash] : no rash [Skin Lesions] : no skin lesions [Dizziness] : no dizziness [Weakness] : no weakness [Confusion] : no confusion was observed [FreeTextEntry2] : Sleeps poorly, intermittently  says she snores. Feels tired [FreeTextEntry9] : neck pain  back better

## 2023-07-27 NOTE — HISTORY OF PRESENT ILLNESS
[Preoperative Visit] : for a medical evaluation prior to surgery [Scheduled Procedure ___] : a [unfilled] [Surgeon Name ___] : surgeon: [unfilled] [Good] : Good [Chest Pain] : chest pain [Urinary Frequency] : urinary frequency [Diabetes] : diabetes [Cardiovascular Disease] : cardiovascular disease [Prior Anesthesia] : Prior anesthesia [FreeTextEntry1] : \par 76 y/o w PMH of CABG 12/05 s/p 4 stents 8/20, HLD, HTN PAF on eliquis, MOHINI vasc 5, DM stable, Severe DDD and cervical disc dse in chronic pain. Pt has h/o elevated PTH and hypothyroid followed by endo, and CKD followed by renal.  Pt with persistent neck pain pain, back is improved. They are ruling out rheum dse.  She no longer has chest pain SOB or  palpitations or racing heart. She continues to take her walks every day. \par \par Saw Rheum was r/o lyme. She does not have it. She was placed on unknown med for generalized pain and feels better [Dyspnea] : no dyspnea [Prev Anesthesia Reaction] : no previous anesthesia reaction [Anesthesia Reaction] : no anesthesia reaction [Sudden Death] : no sudden death

## 2023-08-04 ENCOUNTER — INPATIENT (INPATIENT)
Facility: HOSPITAL | Age: 78
LOS: 3 days | Discharge: HOME | DRG: 303 | End: 2023-08-08
Attending: INTERNAL MEDICINE | Admitting: INTERNAL MEDICINE
Payer: MEDICARE

## 2023-08-04 VITALS
TEMPERATURE: 98 F | DIASTOLIC BLOOD PRESSURE: 72 MMHG | OXYGEN SATURATION: 99 % | RESPIRATION RATE: 18 BRPM | SYSTOLIC BLOOD PRESSURE: 160 MMHG | HEART RATE: 70 BPM

## 2023-08-04 DIAGNOSIS — Z96.631 PRESENCE OF RIGHT ARTIFICIAL WRIST JOINT: Chronic | ICD-10-CM

## 2023-08-04 DIAGNOSIS — Z87.19 PERSONAL HISTORY OF OTHER DISEASES OF THE DIGESTIVE SYSTEM: Chronic | ICD-10-CM

## 2023-08-04 DIAGNOSIS — Z95.1 PRESENCE OF AORTOCORONARY BYPASS GRAFT: Chronic | ICD-10-CM

## 2023-08-04 DIAGNOSIS — R07.9 CHEST PAIN, UNSPECIFIED: ICD-10-CM

## 2023-08-04 DIAGNOSIS — Z98.890 OTHER SPECIFIED POSTPROCEDURAL STATES: Chronic | ICD-10-CM

## 2023-08-04 LAB
ALBUMIN SERPL ELPH-MCNC: 4.1 G/DL — SIGNIFICANT CHANGE UP (ref 3.5–5.2)
ALP SERPL-CCNC: 58 U/L — SIGNIFICANT CHANGE UP (ref 30–115)
ALT FLD-CCNC: 29 U/L — SIGNIFICANT CHANGE UP (ref 0–41)
ANION GAP SERPL CALC-SCNC: 15 MMOL/L — HIGH (ref 7–14)
AST SERPL-CCNC: 19 U/L — SIGNIFICANT CHANGE UP (ref 0–41)
BASOPHILS # BLD AUTO: 0.02 K/UL — SIGNIFICANT CHANGE UP (ref 0–0.2)
BASOPHILS NFR BLD AUTO: 0.2 % — SIGNIFICANT CHANGE UP (ref 0–1)
BILIRUB SERPL-MCNC: 0.5 MG/DL — SIGNIFICANT CHANGE UP (ref 0.2–1.2)
BUN SERPL-MCNC: 54 MG/DL — HIGH (ref 10–20)
CALCIUM SERPL-MCNC: 10 MG/DL — SIGNIFICANT CHANGE UP (ref 8.4–10.5)
CHLORIDE SERPL-SCNC: 96 MMOL/L — LOW (ref 98–110)
CO2 SERPL-SCNC: 24 MMOL/L — SIGNIFICANT CHANGE UP (ref 17–32)
CREAT SERPL-MCNC: 1.5 MG/DL — SIGNIFICANT CHANGE UP (ref 0.7–1.5)
EGFR: 36 ML/MIN/1.73M2 — LOW
EOSINOPHIL # BLD AUTO: 0 K/UL — SIGNIFICANT CHANGE UP (ref 0–0.7)
EOSINOPHIL NFR BLD AUTO: 0 % — SIGNIFICANT CHANGE UP (ref 0–8)
GLUCOSE SERPL-MCNC: 182 MG/DL — HIGH (ref 70–99)
HCT VFR BLD CALC: 40.8 % — SIGNIFICANT CHANGE UP (ref 37–47)
HGB BLD-MCNC: 13.8 G/DL — SIGNIFICANT CHANGE UP (ref 12–16)
IMM GRANULOCYTES NFR BLD AUTO: 0.5 % — HIGH (ref 0.1–0.3)
LIDOCAIN IGE QN: 26 U/L — SIGNIFICANT CHANGE UP (ref 7–60)
LYMPHOCYTES # BLD AUTO: 0.82 K/UL — LOW (ref 1.2–3.4)
LYMPHOCYTES # BLD AUTO: 6.7 % — LOW (ref 20.5–51.1)
MAGNESIUM SERPL-MCNC: 1.7 MG/DL — LOW (ref 1.8–2.4)
MCHC RBC-ENTMCNC: 28.7 PG — SIGNIFICANT CHANGE UP (ref 27–31)
MCHC RBC-ENTMCNC: 33.8 G/DL — SIGNIFICANT CHANGE UP (ref 32–37)
MCV RBC AUTO: 84.8 FL — SIGNIFICANT CHANGE UP (ref 81–99)
MONOCYTES # BLD AUTO: 0.45 K/UL — SIGNIFICANT CHANGE UP (ref 0.1–0.6)
MONOCYTES NFR BLD AUTO: 3.7 % — SIGNIFICANT CHANGE UP (ref 1.7–9.3)
NEUTROPHILS # BLD AUTO: 10.87 K/UL — HIGH (ref 1.4–6.5)
NEUTROPHILS NFR BLD AUTO: 88.9 % — HIGH (ref 42.2–75.2)
NRBC # BLD: 0 /100 WBCS — SIGNIFICANT CHANGE UP (ref 0–0)
PLATELET # BLD AUTO: 281 K/UL — SIGNIFICANT CHANGE UP (ref 130–400)
PMV BLD: 10.5 FL — HIGH (ref 7.4–10.4)
POTASSIUM SERPL-MCNC: 4.1 MMOL/L — SIGNIFICANT CHANGE UP (ref 3.5–5)
POTASSIUM SERPL-SCNC: 4.1 MMOL/L — SIGNIFICANT CHANGE UP (ref 3.5–5)
PROT SERPL-MCNC: 6.8 G/DL — SIGNIFICANT CHANGE UP (ref 6–8)
RBC # BLD: 4.81 M/UL — SIGNIFICANT CHANGE UP (ref 4.2–5.4)
RBC # FLD: 14.5 % — SIGNIFICANT CHANGE UP (ref 11.5–14.5)
SODIUM SERPL-SCNC: 135 MMOL/L — SIGNIFICANT CHANGE UP (ref 135–146)
TROPONIN T SERPL-MCNC: <0.01 NG/ML — SIGNIFICANT CHANGE UP
WBC # BLD: 12.22 K/UL — HIGH (ref 4.8–10.8)
WBC # FLD AUTO: 12.22 K/UL — HIGH (ref 4.8–10.8)

## 2023-08-04 PROCEDURE — 83735 ASSAY OF MAGNESIUM: CPT

## 2023-08-04 PROCEDURE — 80048 BASIC METABOLIC PNL TOTAL CA: CPT

## 2023-08-04 PROCEDURE — 99285 EMERGENCY DEPT VISIT HI MDM: CPT

## 2023-08-04 PROCEDURE — 93306 TTE W/DOPPLER COMPLETE: CPT

## 2023-08-04 PROCEDURE — 84484 ASSAY OF TROPONIN QUANT: CPT

## 2023-08-04 PROCEDURE — 93010 ELECTROCARDIOGRAM REPORT: CPT

## 2023-08-04 PROCEDURE — 93005 ELECTROCARDIOGRAM TRACING: CPT

## 2023-08-04 PROCEDURE — 82553 CREATINE MB FRACTION: CPT

## 2023-08-04 PROCEDURE — A9500: CPT

## 2023-08-04 PROCEDURE — 36415 COLL VENOUS BLD VENIPUNCTURE: CPT

## 2023-08-04 PROCEDURE — 71045 X-RAY EXAM CHEST 1 VIEW: CPT | Mod: 26

## 2023-08-04 PROCEDURE — 82550 ASSAY OF CK (CPK): CPT

## 2023-08-04 PROCEDURE — 85025 COMPLETE CBC W/AUTO DIFF WBC: CPT

## 2023-08-04 PROCEDURE — 78452 HT MUSCLE IMAGE SPECT MULT: CPT

## 2023-08-04 PROCEDURE — 99222 1ST HOSP IP/OBS MODERATE 55: CPT

## 2023-08-04 PROCEDURE — 76705 ECHO EXAM OF ABDOMEN: CPT | Mod: 26

## 2023-08-04 PROCEDURE — 82962 GLUCOSE BLOOD TEST: CPT

## 2023-08-04 PROCEDURE — 80061 LIPID PANEL: CPT

## 2023-08-04 PROCEDURE — 80053 COMPREHEN METABOLIC PANEL: CPT

## 2023-08-04 PROCEDURE — 93017 CV STRESS TEST TRACING ONLY: CPT

## 2023-08-04 PROCEDURE — 83036 HEMOGLOBIN GLYCOSYLATED A1C: CPT

## 2023-08-04 PROCEDURE — 85027 COMPLETE CBC AUTOMATED: CPT

## 2023-08-04 RX ORDER — HYDROCHLOROTHIAZIDE 25 MG
1 TABLET ORAL
Qty: 0 | Refills: 0 | DISCHARGE

## 2023-08-04 RX ORDER — METOPROLOL TARTRATE 50 MG
0 TABLET ORAL
Qty: 0 | Refills: 0 | DISCHARGE

## 2023-08-04 RX ORDER — APIXABAN 2.5 MG/1
0 TABLET, FILM COATED ORAL
Qty: 0 | Refills: 0 | DISCHARGE

## 2023-08-04 RX ORDER — SODIUM CHLORIDE 9 MG/ML
1000 INJECTION INTRAMUSCULAR; INTRAVENOUS; SUBCUTANEOUS ONCE
Refills: 0 | Status: COMPLETED | OUTPATIENT
Start: 2023-08-04 | End: 2023-08-04

## 2023-08-04 RX ORDER — GABAPENTIN 400 MG/1
0 CAPSULE ORAL
Qty: 0 | Refills: 0 | DISCHARGE

## 2023-08-04 RX ORDER — TRAMADOL HYDROCHLORIDE 50 MG/1
1 TABLET ORAL
Qty: 0 | Refills: 0 | DISCHARGE

## 2023-08-04 RX ORDER — ONDANSETRON 8 MG/1
4 TABLET, FILM COATED ORAL ONCE
Refills: 0 | Status: COMPLETED | OUTPATIENT
Start: 2023-08-04 | End: 2023-08-04

## 2023-08-04 RX ORDER — ONDANSETRON 8 MG/1
4 TABLET, FILM COATED ORAL
Refills: 0 | Status: DISCONTINUED | OUTPATIENT
Start: 2023-08-04 | End: 2023-08-08

## 2023-08-04 RX ORDER — PANTOPRAZOLE SODIUM 20 MG/1
1 TABLET, DELAYED RELEASE ORAL
Qty: 0 | Refills: 0 | DISCHARGE

## 2023-08-04 RX ORDER — CLOPIDOGREL BISULFATE 75 MG/1
1 TABLET, FILM COATED ORAL
Qty: 0 | Refills: 0 | DISCHARGE

## 2023-08-04 RX ORDER — POTASSIUM CHLORIDE 20 MEQ
1 PACKET (EA) ORAL
Refills: 0 | DISCHARGE

## 2023-08-04 RX ORDER — AMLODIPINE BESYLATE 2.5 MG/1
10 TABLET ORAL DAILY
Refills: 0 | Status: DISCONTINUED | OUTPATIENT
Start: 2023-08-04 | End: 2023-08-08

## 2023-08-04 RX ORDER — LEVOTHYROXINE SODIUM 125 MCG
1 TABLET ORAL
Refills: 0 | DISCHARGE

## 2023-08-04 RX ORDER — SIMVASTATIN 20 MG/1
40 TABLET, FILM COATED ORAL AT BEDTIME
Refills: 0 | Status: DISCONTINUED | OUTPATIENT
Start: 2023-08-04 | End: 2023-08-08

## 2023-08-04 RX ORDER — ASPIRIN/CALCIUM CARB/MAGNESIUM 324 MG
1 TABLET ORAL
Refills: 0 | DISCHARGE

## 2023-08-04 RX ORDER — LOSARTAN POTASSIUM 100 MG/1
100 TABLET, FILM COATED ORAL DAILY
Refills: 0 | Status: DISCONTINUED | OUTPATIENT
Start: 2023-08-04 | End: 2023-08-08

## 2023-08-04 RX ORDER — ASPIRIN/CALCIUM CARB/MAGNESIUM 324 MG
81 TABLET ORAL DAILY
Refills: 0 | Status: DISCONTINUED | OUTPATIENT
Start: 2023-08-04 | End: 2023-08-05

## 2023-08-04 RX ORDER — ASPIRIN/CALCIUM CARB/MAGNESIUM 324 MG
244 TABLET ORAL DAILY
Refills: 0 | Status: DISCONTINUED | OUTPATIENT
Start: 2023-08-04 | End: 2023-08-04

## 2023-08-04 RX ORDER — POTASSIUM CHLORIDE 20 MEQ
10 PACKET (EA) ORAL DAILY
Refills: 0 | Status: COMPLETED | OUTPATIENT
Start: 2023-08-04 | End: 2023-08-06

## 2023-08-04 RX ORDER — APIXABAN 2.5 MG/1
5 TABLET, FILM COATED ORAL
Refills: 0 | Status: DISCONTINUED | OUTPATIENT
Start: 2023-08-05 | End: 2023-08-05

## 2023-08-04 RX ORDER — EZETIMIBE AND SIMVASTATIN 10; 80 MG/1; MG/1
1 TABLET, FILM COATED ORAL
Qty: 0 | Refills: 0 | DISCHARGE

## 2023-08-04 RX ORDER — METOPROLOL TARTRATE 50 MG
1 TABLET ORAL
Refills: 0 | DISCHARGE

## 2023-08-04 RX ORDER — SODIUM CHLORIDE 9 MG/ML
1000 INJECTION, SOLUTION INTRAVENOUS
Refills: 0 | Status: DISCONTINUED | OUTPATIENT
Start: 2023-08-04 | End: 2023-08-07

## 2023-08-04 RX ORDER — FAMOTIDINE 10 MG/ML
20 INJECTION INTRAVENOUS ONCE
Refills: 0 | Status: COMPLETED | OUTPATIENT
Start: 2023-08-04 | End: 2023-08-04

## 2023-08-04 RX ORDER — APIXABAN 2.5 MG/1
1 TABLET, FILM COATED ORAL
Refills: 0 | DISCHARGE

## 2023-08-04 RX ORDER — NITROGLYCERIN 6.5 MG
0.4 CAPSULE, EXTENDED RELEASE ORAL ONCE
Refills: 0 | Status: COMPLETED | OUTPATIENT
Start: 2023-08-04 | End: 2023-08-04

## 2023-08-04 RX ORDER — FAMOTIDINE 10 MG/ML
20 INJECTION INTRAVENOUS AT BEDTIME
Refills: 0 | Status: DISCONTINUED | OUTPATIENT
Start: 2023-08-04 | End: 2023-08-05

## 2023-08-04 RX ORDER — METOPROLOL TARTRATE 50 MG
25 TABLET ORAL
Refills: 0 | Status: DISCONTINUED | OUTPATIENT
Start: 2023-08-04 | End: 2023-08-08

## 2023-08-04 RX ORDER — MAGNESIUM SULFATE 500 MG/ML
2 VIAL (ML) INJECTION ONCE
Refills: 0 | Status: COMPLETED | OUTPATIENT
Start: 2023-08-04 | End: 2023-08-04

## 2023-08-04 RX ORDER — AMLODIPINE BESYLATE 2.5 MG/1
1 TABLET ORAL
Refills: 0 | DISCHARGE

## 2023-08-04 RX ORDER — ASPIRIN/CALCIUM CARB/MAGNESIUM 324 MG
243 TABLET ORAL ONCE
Refills: 0 | Status: COMPLETED | OUTPATIENT
Start: 2023-08-04 | End: 2023-08-04

## 2023-08-04 RX ORDER — CLOPIDOGREL BISULFATE 75 MG/1
75 TABLET, FILM COATED ORAL DAILY
Refills: 0 | Status: DISCONTINUED | OUTPATIENT
Start: 2023-08-04 | End: 2023-08-08

## 2023-08-04 RX ORDER — LEVOTHYROXINE SODIUM 125 MCG
25 TABLET ORAL DAILY
Refills: 0 | Status: DISCONTINUED | OUTPATIENT
Start: 2023-08-04 | End: 2023-08-08

## 2023-08-04 RX ADMIN — Medication 25 GRAM(S): at 21:36

## 2023-08-04 RX ADMIN — FAMOTIDINE 20 MILLIGRAM(S): 10 INJECTION INTRAVENOUS at 20:34

## 2023-08-04 RX ADMIN — SODIUM CHLORIDE 1000 MILLILITER(S): 9 INJECTION INTRAMUSCULAR; INTRAVENOUS; SUBCUTANEOUS at 20:34

## 2023-08-04 RX ADMIN — Medication 243 MILLIGRAM(S): at 22:03

## 2023-08-04 NOTE — ED PROVIDER NOTE - ATTENDING CONTRIBUTION TO CARE
77-year-old female diabetic history of CAD status post CABG 2005 and stent 3 years ago on aspirin Plavix and Eliquis, questionable history of gallstones/cannot remember if she had surgery complains of intermittent epigastric discomfort radiating into chest and back without associated diaphoresis shortness of breath or exertional component, dissimilar to prior MI, no apparent relationship to food, began at 1 PM and has been coming and going, currently pain-free, no nausea or vomiting, has noticed her stool to be pasty lately, no blood or melena, was to follow with her cardiologist in September but did not, on exam vital signs appreciated, well-appearing, NC/AT, PERRL, conjunctive a pink, neck supple, cor regular rate and rhythm with extrasystoles, lungs CTA, abdomen soft nontender nondistended, pulses equal, no clubbing cyanosis or edema, calves nontender, neurologically intact, will perform cardiac work-up in addition to right upper quadrant sono

## 2023-08-04 NOTE — ED PROVIDER NOTE - OBJECTIVE STATEMENT
77-year-old female w PMHx of DM, CAD status post CABG 2005 and stent 3 years ago on Aspirin Plavix and Eliquis, HTN, hypothyroidism, CKD, HLD, gallstone Hx (questionable cholecystectomy history?), presents to ED w complains of intermittent epigastric pain radiating into chest and back since 1 pm today. Pain is constant, did not take any pain meds PTA. Described as squeezing. Associated w mild nausea. Last stress test 1 yr ago. Cardio Dr Marte. Denies fever, chills, SOB, diaphoresis, vomiting, diarrhea, urinary sx, LE edema.

## 2023-08-04 NOTE — ED PROVIDER NOTE - PROGRESS NOTE DETAILS
Authored by Dr. Gomez: sx currently improved, given extensive CAD history will be admitted, pt agrees w plan

## 2023-08-04 NOTE — H&P ADULT - HISTORY OF PRESENT ILLNESS
78 yo female whose PMH includes CAD (CABG in 2005, stent 3 years ago) CKD stage 3,  hypothyroidism, DM, PAF, and  HTN presents to the ER due to intermittent squeezing gastric pains which radiate to her chest and back since 1 PM today. Associated with nausea. Following ER treatments, is feeling much better  78 yo female whose PMH includes CAD (CABG in 2005, stent 3 years ago) CKD stage 3,  hypothyroidism, DM, PAF, and  HTN presents to the ER due to intermittent squeezing gastric pains which radiate to her chest and back since 1 PM today. Associated with nausea. Following ER treatments which included Pepcid IVP and Zofran, is feeling much better

## 2023-08-04 NOTE — ED PROVIDER NOTE - CLINICAL SUMMARY MEDICAL DECISION MAKING FREE TEXT BOX
Patient with epigastric pain radiating to the chest, exam as above, GI versus cardiac, lab studies appreciated, will admit monitored setting

## 2023-08-04 NOTE — H&P ADULT - NSHPLABSRESULTS_GEN_ALL_CORE
13.8   12.22 )-----------( 281      ( 04 Aug 2023 20:15 )             40.8     08-04    135  |  96<L>  |  54<H>  ----------------------------<  182<H>  4.1   |  24  |  1.5    Ca    10.0      04 Aug 2023 20:15  Mg     1.7     08-04    TPro  6.8  /  Alb  4.1  /  TBili  0.5  /  DBili  x   /  AST  19  /  ALT  29  /  AlkPhos  58  08-04          Urinalysis Basic - ( 04 Aug 2023 20:15 )    Color: x / Appearance: x / SG: x / pH: x  Gluc: 182 mg/dL / Ketone: x  / Bili: x / Urobili: x   Blood: x / Protein: x / Nitrite: x   Leuk Esterase: x / RBC: x / WBC x   Sq Epi: x / Non Sq Epi: x / Bacteria: x        Lactate Trend    CARDIAC MARKERS ( 04 Aug 2023 20:15 )  x     / <0.01 ng/mL / x     / x     / x          CAPILLARY BLOOD GLUCOSE    < from: US Abdomen Upper Quadrant Right (08.04.23 @ 20:32) >      ACC: [id number] EXAM:  US ABDOMEN RT UPR QUADRANT   ORDERED BY: DEWAYNE GARCIA     PROCEDURE DATE:  08/04/2023      < from: US Abdomen Upper Quadrant Right (08.04.23 @ 20:32) >    IMPRESSION:    1. Post cholecystectomy.  2. Otherwise, unremarkable exam.    HEATH OH MD; Attending Radiologist  This document has been electronically signed. Aug  4 2023  8:40PM

## 2023-08-04 NOTE — H&P ADULT - ASSESSMENT
chest pain chest pain - although seems to be GI in origin, concerning due to history of CAD (stents, CABG) - serial troponin levels and ask cardiology to see    DM type 2       Magnesium deficiency       Leukocytosis     HTN    HLD    hypothyroid    mild DANGELO on CKD stage 3     Hemmorrhoids bleeding  chest pain - although seems to be GI in origin, concerning due to history of CAD (stents, CABG) - For now continue usual meds (as available), add daily pepcid, order serial troponin levels and ask cardiology to see    DM type 2 - monitor off oral medication, would consider sliding scale insulin    Magnesium deficiency (supplemented in the ER)      Leukocytosis (suspect reactive) - repeat in am    HTN    HLD zetia is not available, continue Zocor     hypothyroid - continue replacement     mild DANGELO on CKD stage 3 - gentle hydration (suspect pre renal component)      Hemmorrhoids bleeding is changing anti platelet regimen an option?  chest pain - although seems to be GI in origin, concerning due to history of CAD (stents, CABG) - For now continue usual meds (as available), add daily Pepcid, order serial troponin levels and ask cardiology to see    DM type 2 - monitor off oral medication, would consider sliding scale insulin    Magnesium deficiency (supplemented in the ER)- repeat level in am      Leukocytosis (suspect reactive) - repeat in am    HTN - continue usual meds (substitute as needed)    HLD - Zetia is not available, continue Zocor     hypothyroid - continue replacement     mild DANGEOL on CKD stage 3 - gentle hydration (suspect pre renal component)      Hemorrhoid  bleeding is changing anti platelet regimen an option?

## 2023-08-04 NOTE — ED ADULT NURSE NOTE - NSFALLUNIVINTERV_ED_ALL_ED
Bed/Stretcher in lowest position, wheels locked, appropriate side rails in place/Call bell, personal items and telephone in reach/Instruct patient to call for assistance before getting out of bed/chair/stretcher/Non-slip footwear applied when patient is off stretcher/Bono to call system/Physically safe environment - no spills, clutter or unnecessary equipment/Purposeful proactive rounding/Room/bathroom lighting operational, light cord in reach

## 2023-08-04 NOTE — H&P ADULT - NSHPREVIEWOFSYSTEMS_GEN_ALL_CORE
has developed some hemmoid bleeding at times, uses glasses has developed some hemorrhoid  bleeding at times, uses glasses

## 2023-08-04 NOTE — ED PROVIDER NOTE - PHYSICAL EXAMINATION
VITAL SIGNS: I have reviewed nursing notes and confirm.  CONSTITUTIONAL: non-toxic, well appearing  SKIN: no rash, no petechiae, + well healed midsternal surgical scar   EYES: PERRL, EOMI, pink conjunctiva, anicteric  ENT: tongue midline, no exudates, MMM  NECK: Supple; no meningismus, no JVD  CARD: RRR, no murmurs, equal radial pulses bilaterally 2+  RESP: CTAB, no respiratory distress  ABD: Soft, non-tender, non-distended  EXT: Normal ROM x4. No edema.   NEURO: Alert, oriented. no focal deficits   PSYCH: Cooperative, appropriate.

## 2023-08-05 LAB
ALBUMIN SERPL ELPH-MCNC: 3.8 G/DL — SIGNIFICANT CHANGE UP (ref 3.5–5.2)
ALP SERPL-CCNC: 52 U/L — SIGNIFICANT CHANGE UP (ref 30–115)
ALT FLD-CCNC: 25 U/L — SIGNIFICANT CHANGE UP (ref 0–41)
ANION GAP SERPL CALC-SCNC: 9 MMOL/L — SIGNIFICANT CHANGE UP (ref 7–14)
AST SERPL-CCNC: 17 U/L — SIGNIFICANT CHANGE UP (ref 0–41)
BILIRUB SERPL-MCNC: 0.7 MG/DL — SIGNIFICANT CHANGE UP (ref 0.2–1.2)
BUN SERPL-MCNC: 44 MG/DL — HIGH (ref 10–20)
CALCIUM SERPL-MCNC: 9.8 MG/DL — SIGNIFICANT CHANGE UP (ref 8.4–10.5)
CHLORIDE SERPL-SCNC: 101 MMOL/L — SIGNIFICANT CHANGE UP (ref 98–110)
CK MB CFR SERPL CALC: 3.9 NG/ML — SIGNIFICANT CHANGE UP (ref 0.6–6.3)
CK SERPL-CCNC: 59 U/L — SIGNIFICANT CHANGE UP (ref 0–225)
CO2 SERPL-SCNC: 33 MMOL/L — HIGH (ref 17–32)
CREAT SERPL-MCNC: 1.4 MG/DL — SIGNIFICANT CHANGE UP (ref 0.7–1.5)
EGFR: 39 ML/MIN/1.73M2 — LOW
GLUCOSE BLDC GLUCOMTR-MCNC: 124 MG/DL — HIGH (ref 70–99)
GLUCOSE BLDC GLUCOMTR-MCNC: 128 MG/DL — HIGH (ref 70–99)
GLUCOSE BLDC GLUCOMTR-MCNC: 80 MG/DL — SIGNIFICANT CHANGE UP (ref 70–99)
GLUCOSE BLDC GLUCOMTR-MCNC: 94 MG/DL — SIGNIFICANT CHANGE UP (ref 70–99)
GLUCOSE SERPL-MCNC: 76 MG/DL — SIGNIFICANT CHANGE UP (ref 70–99)
HCT VFR BLD CALC: 41.5 % — SIGNIFICANT CHANGE UP (ref 37–47)
HGB BLD-MCNC: 14.2 G/DL — SIGNIFICANT CHANGE UP (ref 12–16)
MAGNESIUM SERPL-MCNC: 2.2 MG/DL — SIGNIFICANT CHANGE UP (ref 1.8–2.4)
MCHC RBC-ENTMCNC: 29.1 PG — SIGNIFICANT CHANGE UP (ref 27–31)
MCHC RBC-ENTMCNC: 34.2 G/DL — SIGNIFICANT CHANGE UP (ref 32–37)
MCV RBC AUTO: 85 FL — SIGNIFICANT CHANGE UP (ref 81–99)
NRBC # BLD: 0 /100 WBCS — SIGNIFICANT CHANGE UP (ref 0–0)
PLATELET # BLD AUTO: 245 K/UL — SIGNIFICANT CHANGE UP (ref 130–400)
PMV BLD: 10.3 FL — SIGNIFICANT CHANGE UP (ref 7.4–10.4)
POTASSIUM SERPL-MCNC: 4.1 MMOL/L — SIGNIFICANT CHANGE UP (ref 3.5–5)
POTASSIUM SERPL-SCNC: 4.1 MMOL/L — SIGNIFICANT CHANGE UP (ref 3.5–5)
PROT SERPL-MCNC: 6.3 G/DL — SIGNIFICANT CHANGE UP (ref 6–8)
RBC # BLD: 4.88 M/UL — SIGNIFICANT CHANGE UP (ref 4.2–5.4)
RBC # FLD: 14.6 % — HIGH (ref 11.5–14.5)
SODIUM SERPL-SCNC: 143 MMOL/L — SIGNIFICANT CHANGE UP (ref 135–146)
TROPONIN T SERPL-MCNC: <0.01 NG/ML — SIGNIFICANT CHANGE UP
TROPONIN T SERPL-MCNC: <0.01 NG/ML — SIGNIFICANT CHANGE UP
WBC # BLD: 9.41 K/UL — SIGNIFICANT CHANGE UP (ref 4.8–10.8)
WBC # FLD AUTO: 9.41 K/UL — SIGNIFICANT CHANGE UP (ref 4.8–10.8)

## 2023-08-05 PROCEDURE — 93010 ELECTROCARDIOGRAM REPORT: CPT

## 2023-08-05 PROCEDURE — 99222 1ST HOSP IP/OBS MODERATE 55: CPT

## 2023-08-05 PROCEDURE — 99232 SBSQ HOSP IP/OBS MODERATE 35: CPT

## 2023-08-05 RX ORDER — DEXTROSE 50 % IN WATER 50 %
25 SYRINGE (ML) INTRAVENOUS ONCE
Refills: 0 | Status: DISCONTINUED | OUTPATIENT
Start: 2023-08-05 | End: 2023-08-08

## 2023-08-05 RX ORDER — INSULIN LISPRO 100/ML
VIAL (ML) SUBCUTANEOUS EVERY 6 HOURS
Refills: 0 | Status: DISCONTINUED | OUTPATIENT
Start: 2023-08-05 | End: 2023-08-08

## 2023-08-05 RX ORDER — ENOXAPARIN SODIUM 100 MG/ML
60 INJECTION SUBCUTANEOUS EVERY 12 HOURS
Refills: 0 | Status: DISCONTINUED | OUTPATIENT
Start: 2023-08-05 | End: 2023-08-08

## 2023-08-05 RX ORDER — SODIUM CHLORIDE 9 MG/ML
1000 INJECTION, SOLUTION INTRAVENOUS
Refills: 0 | Status: DISCONTINUED | OUTPATIENT
Start: 2023-08-05 | End: 2023-08-08

## 2023-08-05 RX ORDER — DEXTROSE 50 % IN WATER 50 %
12.5 SYRINGE (ML) INTRAVENOUS ONCE
Refills: 0 | Status: DISCONTINUED | OUTPATIENT
Start: 2023-08-05 | End: 2023-08-08

## 2023-08-05 RX ORDER — DEXTROSE 50 % IN WATER 50 %
15 SYRINGE (ML) INTRAVENOUS ONCE
Refills: 0 | Status: DISCONTINUED | OUTPATIENT
Start: 2023-08-05 | End: 2023-08-08

## 2023-08-05 RX ORDER — GLUCAGON INJECTION, SOLUTION 0.5 MG/.1ML
1 INJECTION, SOLUTION SUBCUTANEOUS ONCE
Refills: 0 | Status: DISCONTINUED | OUTPATIENT
Start: 2023-08-05 | End: 2023-08-08

## 2023-08-05 RX ORDER — REGADENOSON 0.08 MG/ML
0.4 INJECTION, SOLUTION INTRAVENOUS ONCE
Refills: 0 | Status: COMPLETED | OUTPATIENT
Start: 2023-08-05 | End: 2023-08-06

## 2023-08-05 RX ORDER — PANTOPRAZOLE SODIUM 20 MG/1
40 TABLET, DELAYED RELEASE ORAL
Refills: 0 | Status: DISCONTINUED | OUTPATIENT
Start: 2023-08-05 | End: 2023-08-08

## 2023-08-05 RX ADMIN — Medication 25 MILLIGRAM(S): at 05:34

## 2023-08-05 RX ADMIN — SODIUM CHLORIDE 50 MILLILITER(S): 9 INJECTION, SOLUTION INTRAVENOUS at 05:37

## 2023-08-05 RX ADMIN — Medication 25 MICROGRAM(S): at 05:33

## 2023-08-05 RX ADMIN — LOSARTAN POTASSIUM 100 MILLIGRAM(S): 100 TABLET, FILM COATED ORAL at 05:35

## 2023-08-05 RX ADMIN — ENOXAPARIN SODIUM 60 MILLIGRAM(S): 100 INJECTION SUBCUTANEOUS at 17:21

## 2023-08-05 RX ADMIN — APIXABAN 5 MILLIGRAM(S): 2.5 TABLET, FILM COATED ORAL at 05:35

## 2023-08-05 RX ADMIN — CLOPIDOGREL BISULFATE 75 MILLIGRAM(S): 75 TABLET, FILM COATED ORAL at 12:31

## 2023-08-05 RX ADMIN — Medication 25 MILLIGRAM(S): at 17:33

## 2023-08-05 RX ADMIN — Medication 81 MILLIGRAM(S): at 12:31

## 2023-08-05 RX ADMIN — SIMVASTATIN 40 MILLIGRAM(S): 20 TABLET, FILM COATED ORAL at 21:29

## 2023-08-05 RX ADMIN — AMLODIPINE BESYLATE 10 MILLIGRAM(S): 2.5 TABLET ORAL at 05:33

## 2023-08-05 RX ADMIN — Medication 10 MILLIEQUIVALENT(S): at 12:31

## 2023-08-05 NOTE — CONSULT NOTE ADULT - SUBJECTIVE AND OBJECTIVE BOX
78 y/o f pmh CAD CABG () 4 stents 2020, HTN, HLD, DM, pAfib on Eliquis, CKD, degenerative disc disease presents for epigastric pain. Pt reports intermittent epigastric pain radiating to her back x1 day, feels like squeezing, non-exertional with no modifying factors, pain different from her previous MI. denies associated sob, palpitation, n/v/d fever, chills. Of note pt had  stress echo 2022 non-diagnostic 2/2 suboptimal hr response. Jagruti NM 2022 showing mild ischemia in area of prior infarct, was managed medically. Pt pending repeat outpatient stress test in Aug    Cards: Dr Marte      PAST MEDICAL & SURGICAL HISTORY  Chronic midline back pain, unspecified back location    CAD (coronary artery disease)    HTN (hypertension)    Heart attack    Fibromyalgia    History of right wrist replacement    H/O appendicitis     delivery delivered    History of appendectomy    S/P CABG (coronary artery bypass graft)        FAMILY HISTORY:  FAMILY HISTORY:      SOCIAL HISTORY:  []smoker  []Alcohol  []Drug    ALLERGIES:  penicillin (Hives)  tizanidine (Hives)  nitrofurantoin (Nausea; Diarrhea)      MEDICATIONS:  MEDICATIONS  (STANDING):  amLODIPine   Tablet 10 milliGRAM(s) Oral daily  apixaban 5 milliGRAM(s) Oral two times a day  aspirin  chewable 81 milliGRAM(s) Oral daily  clopidogrel Tablet 75 milliGRAM(s) Oral daily  famotidine    Tablet 20 milliGRAM(s) Oral at bedtime  hydrochlorothiazide 25 milliGRAM(s) Oral daily  lactated ringers. 1000 milliLiter(s) (50 mL/Hr) IV Continuous <Continuous>  levothyroxine 25 MICROGram(s) Oral daily  losartan 100 milliGRAM(s) Oral daily  metoprolol tartrate 25 milliGRAM(s) Oral two times a day  potassium chloride    Tablet ER 10 milliEquivalent(s) Oral daily  simvastatin 40 milliGRAM(s) Oral at bedtime    MEDICATIONS  (PRN):  ondansetron Injectable 4 milliGRAM(s) IV Push four times a day PRN Nausea and/or Vomiting      HOME MEDICATIONS:  Home Medications:  amLODIPine 10 mg oral tablet: 1 orally once a day (04 Aug 2023 23:15)  aspirin 81 mg oral capsule: 1 orally once a day (04 Aug 2023 23:12)  candesartan 32 mg oral tablet: 1 tab(s) orally once a day (04 Aug 2023 23:17)  clopidogrel 75 mg oral tablet: 1 tab(s) orally once a day (04 Aug 2023 23:17)  Eliquis 5 mg oral tablet: 1 orally 2 times a day (04 Aug 2023 23:09)  ezetimibe-simvastatin 10 mg-40 mg oral tablet: 1 tab(s) orally once a day (04 Aug 2023 23:17)  Glucotrol XL 5 mg oral tablet, extended release: 1 orally once a day (04 Aug 2023 23:15)  hydroCHLOROthiazide 25 mg oral tablet: 1 tab(s) orally once a day (04 Aug 2023 23:17)  levothyroxine 25 mcg (0.025 mg) oral tablet: 1 orally once a day (04 Aug 2023 23:14)  Metoprolol Tartrate 25 mg oral tablet: 1 orally 2 times a day (04 Aug 2023 23:13)  potassium chloride 10 mEq oral capsule, extended release: 1 orally once a day (04 Aug 2023 23:16)      VITALS:   T(F): 96.6 ( @ 04:45), Max: 98 ( @ 18:42)  HR: 82 ( @ 05:39) (54 - 85)  BP: 135/64 (-05 @ 05:39) (135/64 - 165/70)  BP(mean): --  RR: 16 ( @ 04:45) (16 - 18)  SpO2: 97% ( @ 04:45) (97% - 100%)    I&O's Summary      REVIEW OF SYSTEMS:  CONSTITUTIONAL: No weakness, fevers or chills  EYES: No visual changes  ENT: No vertigo or throat pain   NECK: No pain or stiffness  RESPIRATORY: No cough, wheezing, hemoptysis; No shortness of breath  CARDIOVASCULAR: No chest pain or palpitations  GASTROINTESTINAL: No abdominal or epigastric pain. No nausea, vomiting, or hematemesis; No diarrhea or constipation. No melena or hematochezia.  GENITOURINARY: No dysuria, frequency or hematuria  NEUROLOGICAL: No numbness or weakness  SKIN: No itching, no rashes  MSK: no    PHYSICAL EXAM:  NEURO: patient is awake , alert and oriented  GEN: Not in acute distress  NECK: no thyroid enlargement, no JVD  LUNGS: Clear to auscultation bilaterally   CARDIOVASCULAR: S1/S2 present, RRR , no murmurs or rubs, no carotid bruits,  + PP bilaterally  ABD: Soft, non-tender, non-distended, +BS  EXT: No LATRELL  SKIN: Intact    LABS:                        14.2   9.41  )-----------( 245      ( 05 Aug 2023 06:58 )             41.5         143  |  101  |  44<H>  ----------------------------<  76  4.1   |  33<H>  |  1.4    Ca    9.8      05 Aug 2023 06:58  Mg     2.2         TPro  6.3  /  Alb  3.8  /  TBili  0.7  /  DBili  x   /  AST  17  /  ALT  25  /  AlkPhos  52        Creatine Kinase, Serum: 59 U/L (23 @ 06:58)  Troponin T, Serum: <0.01 ng/mL (23 @ 06:58)  Troponin T, Serum: <0.01 ng/mL (23 @ 20:15)    CARDIAC MARKERS ( 05 Aug 2023 06:58 )  x     / <0.01 ng/mL / 59 U/L / x     / 3.9 ng/mL  CARDIAC MARKERS ( 04 Aug 2023 20:15 )  x     / <0.01 ng/mL / x     / x     / x            Troponin trend:            RADIOLOGY:  -CXR:  < from: Xray Chest 1 View- PORTABLE-Urgent (23 @ 19:45) >  Impression:    No radiographic evidence of acute cardiopulmonary disease. Stable   cardiomegaly    --- End of Report ---    < end of copied text >    -TTE:  < from: Transthoracic Echocardiogram (20 @ 10:06) >  Summary:   1. Left ventricular ejection fraction, by visual estimation, is 50 to 55%.   2. Mildly increased LV wall thickness.   3. The left ventricular diastolic function could not be assessed inthis study.   4. Mild mitral valve regurgitation.   5. Sclerotic aortic valve with normal opening.    < end of copied text >    NM STRESS  NM Nuclear Stress Pharmacologic Multiple (22 @ 10:23) >  IMPRESSION:  Intravenous Lexiscan sestamibi myocardial imaging study with the patient   completing the study with the development of chest pressure and transient   hypotension with Lexiscan infusion with nonspecific ST-T wave changes.   These symptoms resolved inrecovery. Review myocardial perfusion imaging   revealed with Lexiscan there was a moderate-sized mild defect involving   the inferolateral segment. There was partial reperfusion noted in the   inferolateral segment. This is consistent with mild ischemia in an area   of prior infarct. There is a small segment of mildly decreased uptake in   the inferior base without definite reperfusion noted which is most   consistent with possible scar versus attenuation without definite   evidence of ischemia.Gated imaging revealed limited imaging. There   appeared to be diminished wall motion in the proximal inferior segment   and inferolateral segment. Ejection fraction was not able to be estimated   with a technically limited study. Clinical correlation is suggested.    --- End of Report ---    < end of copied text >      -CATHETERIZATION:  INTERVENTION: s/p PCI+stentsx1 to distal RCA, s/p PCI+stentsx3 to mid RCA  IMPLANTS:   Synergy 2.5x12 mm to distal RCA  Synergy 2.5x12mm, 2.5x12mm, [id number] to mid RCA  RCA was dilated to patent with DEEDEE grade III flow.    POST-OP DIAGNOSIS  Significant 3V disease, atretic LIMA, patent SVG to distal LAD s/p PCI+stentsx1 to distal RCA, s/p PCI+stentsx3 to mid RCA      ECG:    TELEMETRY EVENTS:

## 2023-08-05 NOTE — CONSULT NOTE ADULT - NS ATTEND BILL GEN_ALL_CORE
Side Effects Or Complications: Extensive bruising Detail Level: Zone Global Improvement: Good Attending to bill Comments (Free Text): Recommended patient to use Arnica Bruising gel and/or tablets. Treatment (Optional): Botox Patient Satisfaction: Pleased

## 2023-08-05 NOTE — PROGRESS NOTE ADULT - ASSESSMENT
MEDICATIONS  (STANDING):  amLODIPine   Tablet 10 milliGRAM(s) Oral daily  aspirin  chewable 81 milliGRAM(s) Oral daily  clopidogrel Tablet 75 milliGRAM(s) Oral daily  enoxaparin Injectable 60 milliGRAM(s) SubCutaneous every 12 hours  famotidine    Tablet 20 milliGRAM(s) Oral at bedtime  hydrochlorothiazide 25 milliGRAM(s) Oral daily  lactated ringers. 1000 milliLiter(s) (50 mL/Hr) IV Continuous <Continuous>  levothyroxine 25 MICROGram(s) Oral daily  losartan 100 milliGRAM(s) Oral daily  metoprolol tartrate 25 milliGRAM(s) Oral two times a day  potassium chloride    Tablet ER 10 milliEquivalent(s) Oral daily  regadenoson Injectable 0.4 milliGRAM(s) IV Push once  simvastatin 40 milliGRAM(s) Oral at bedtime    MEDICATIONS  (PRN):  ondansetron Injectable 4 milliGRAM(s) IV Push four times a day PRN Nausea and/or Vomiting    Assessment and Plan  Chest pain/gastric squeezin pain hx/o CAD/cabg 2005 hx/o MI 2020 stent  - NPO after MN, trop x 2 -ve, pending trop at 1500   - cont asa, plavix, metoprolol, zocor   - change eliquis to lovenox for now  - cardiology followin     DMT2 with CKD 3     P. Atrial fibrillation   - eliquis change to lovenox 1 mg/kg BID for now   - rate controlled, cont metoprolol 25 mg bid     HTN  - cont metoprolol bid, HCTZ, losartan and norvasc     Hypothyroidism   - cont synthroid     Chronic back pain/ Hx/o fibromyalgia  - cont robaxin     DVT/GI px  - Lovenox 1 mg/kg bid/ pepcid     Full code                MEDICATIONS  (STANDING):  amLODIPine   Tablet 10 milliGRAM(s) Oral daily  aspirin  chewable 81 milliGRAM(s) Oral daily  clopidogrel Tablet 75 milliGRAM(s) Oral daily  enoxaparin Injectable 60 milliGRAM(s) SubCutaneous every 12 hours   hydrochlorothiazide 25 milliGRAM(s) Oral daily  lactated ringers. 1000 milliLiter(s) (50 mL/Hr) IV Continuous <Continuous>  levothyroxine 25 MICROGram(s) Oral daily  losartan 100 milliGRAM(s) Oral daily  metoprolol tartrate 25 milliGRAM(s) Oral two times a day  potassium chloride    Tablet ER 10 milliEquivalent(s) Oral daily  regadenoson Injectable 0.4 milliGRAM(s) IV Push once  simvastatin 40 milliGRAM(s) Oral at bedtime    MEDICATIONS  (PRN):  ondansetron Injectable 4 milliGRAM(s) IV Push four times a day PRN Nausea and/or Vomiting    Assessment and Plan  Chest pain/gastric squeezin pain hx/o CAD/cabg 2005 hx/o MI 2020 stent  - NPO after MN, trop x 3 -ve,  - cont asa, plavix, metoprolol, zocor   - change eliquis to lovenox for now  - cardiology followin   - change pepcid to ppi po daily     DMT2 with CKD 3   - SSI Q6H   - stable     P. Atrial fibrillation   - eliquis change to lovenox 1 mg/kg BID for now   - rate controlled, cont metoprolol 25 mg bid     HTN  - cont metoprolol bid, HCTZ, losartan and norvasc     Hypothyroidism   - cont synthroid     Chronic back pain/ Hx/o fibromyalgia  - cont robaxin     DVT/GI px  - Lovenox 1 mg/kg bid/ pepcid     Full code

## 2023-08-05 NOTE — CONSULT NOTE ADULT - NS ATTEND AMEND GEN_ALL_CORE FT
The patient was seen and examined . The patient has history of CABG in 2005 , known SVG to LAD which was patent at cath in 2020 , S/P PCI and 3 EDDA placed in the RCA at that time . The patient has been feeling well until yesterday when she began experiencing epigastric and lower sternal pain which would last a minute before resolving but would tend to recur . It was also associated with a tightness on the upper abdomen which she attributed to her back issues . It was not associated with exertion or meals . She is without symptoms and her Cardiac enzymes were negative . The pain is not similar to her previous cardiac pain . At this time would continue current medication . Would continue Plavix and A/C  ( she does have PAF ) , beta blockers and the remainder of her BP medications . The patient has HTN , DM , hyperlipidemia and PAF . Would get Lexiscan stress test . If unchanged from last year would continue medical treatment and follow up with Dr. Marte as outpatient. If changed will need repeat cath . She was changed to LMWH in case she may need cath .

## 2023-08-05 NOTE — CONSULT NOTE ADULT - ASSESSMENT
78 y/o f pmh CAD CABG (2005) s/p pci w/ 4 stents 08/2020, HTN, HLD, DM, pAfib on Eliquis, CKD, degenerative disc disease presents for epigastric pain.    Initial troponin: <0.01  ecg: NSR IRBB    Impression  # Atypical chest pain; would r/o MI given patient's history    Plan  - currently hd stable, no chest pain or SOB, no overt ADHF on exam  - repeat Trop x1  - when able ambulate OOB  - please keep npo from [place] for possible Jagruti NM stress 08/06 at [facility]; no caffeinated beverages  - switch Eliquis to Lovenox for now incase intervention needed  - If no acute abnormality on Jagruti nuclear, then cleared for dc from cardiac standpoint, f/u with Dr Marte  - continue aspirin 81mg qd  - BB/Statin/ARB  - patient with azotemia, possible pre-renal 2/2 dehydration, consider giving gentle IV hydration      ***Attending attestation to follow**** 78 y/o f pmh CAD CABG (2005) s/p pci w/ 4 stents 08/2020, HTN, HLD, DM, pAfib on Eliquis, CKD, degenerative disc disease presents for epigastric pain.    Initial troponin: <0.01  ecg: NSR IRBB    Impression  # Atypical chest pain; would r/o MI given patient's history    Plan  - currently hd stable, no chest pain or SOB, no overt ADHF on exam  - repeat Trop x1  - when able ambulate OOB  - please keep npo from [place] for possible Jagruti NM stress 08/06 at [facility]; no caffeinated beverages  - switch Eliquis to Lovenox for now incase intervention needed  - If no acute abnormality on Jagruti nuclear, then cleared for dc from cardiac standpoint, f/u with Dr Marte  - would dc Asa but continue Plavix and AC  - BB/Statin/ARB  - patient with azotemia, possible pre-renal 2/2 dehydration, consider giving gentle IV hydration      ***Attending attestation to follow****

## 2023-08-05 NOTE — PROGRESS NOTE ADULT - SUBJECTIVE AND OBJECTIVE BOX
MALIHA [id number]    Subjective/Interval History       ROS    PHYSICAL EXAM  Vital Signs Last 24 Hrs  T(C): 35.9 (05 Aug 2023 04:45), Max: 36.7 (04 Aug 2023 18:42)  T(F): 96.6 (05 Aug 2023 04:45), Max: 98 (04 Aug 2023 18:42)  HR: 82 (05 Aug 2023 05:39) (54 - 85)  BP: 135/64 (05 Aug 2023 05:39) (135/64 - 165/70)  BP(mean): --  RR: 16 (05 Aug 2023 04:45) (16 - 18)  SpO2: 97% (05 Aug 2023 04:45) (97% - 100%)    Parameters below as of 05 Aug 2023 04:45  Patient On (Oxygen Delivery Method): room air      GA : AAOX3, NAD   HEENT: PERRLA, EOMI  NECK: no JVD, no thyromegaly   CVS: S1 S2 no murmur no rubs no gallop  RESP: CTAB no wheeze, no rhonchi no rales  ABD: Soft, NT, ND, tympanic, no rebound or guarding   : No Orellana, No CVA tenderness   EXT; no pedal edema, no cyanosis  MSK: No ML spinal tenderness, normal ROM   NEURO: AAOX3, no new focal deficits     LABS/ IMAGING                        14.2   9.41  )-----------( 245      ( 05 Aug 2023 06:58 )             41.5     CARDIAC MARKERS ( 05 Aug 2023 06:58 )  x     / <0.01 ng/mL / 59 U/L / x     / 3.9 ng/mL  CARDIAC MARKERS ( 04 Aug 2023 20:15 )  x     / <0.01 ng/mL / x     / x     / x          08-05    143  |  101  |  44<H>  ----------------------------<  76  4.1   |  33<H>  |  1.4    Ca    9.8      05 Aug 2023 06:58  Mg     2.2     08-05    TPro  6.3  /  Alb  3.8  /  TBili  0.7  /  DBili  x   /  AST  17  /  ALT  25  /  AlkPhos  52  08-05    CAPILLARY BLOOD GLUCOSE      POCT Blood Glucose.: 94 mg/dL (05 Aug 2023 11:32)  POCT Blood Glucose.: 80 mg/dL (05 Aug 2023 07:37)    Urinalysis Basic - ( 05 Aug 2023 06:58 )    Color: x / Appearance: x / SG: x / pH: x  Gluc: 76 mg/dL / Ketone: x  / Bili: x / Urobili: x   Blood: x / Protein: x / Nitrite: x   Leuk Esterase: x / RBC: x / WBC x   Sq Epi: x / Non Sq Epi: x / Bacteria: x      LIVER FUNCTIONS - ( 05 Aug 2023 06:58 )  Alb: 3.8 g/dL / Pro: 6.3 g/dL / ALK PHOS: 52 U/L / ALT: 25 U/L / AST: 17 U/L / GGT: x                  MALIHA [id number]    Subjective/Interval History   - no chest pain today, yesterday epigastric/CP radiating to back, next month EGD due.     ROS  - no SOB, or fever.     PHYSICAL EXAM  Vital Signs Last 24 Hrs  T(C): 35.9 (05 Aug 2023 04:45), Max: 36.7 (04 Aug 2023 18:42)  T(F): 96.6 (05 Aug 2023 04:45), Max: 98 (04 Aug 2023 18:42)  HR: 82 (05 Aug 2023 05:39) (54 - 85)  BP: 135/64 (05 Aug 2023 05:39) (135/64 - 165/70)  BP(mean): --  RR: 16 (05 Aug 2023 04:45) (16 - 18)  SpO2: 97% (05 Aug 2023 04:45) (97% - 100%)    Parameters below as of 05 Aug 2023 04:45  Patient On (Oxygen Delivery Method): room air    GA : AAOX3, NAD   HEENT: PERRLA, EOMI  NECK: no JVD, no thyromegaly   CVS: S1 S2 no murmur no rubs no gallop  RESP: CTAB no wheeze, no rhonchi no rales  ABD: Soft, NT, ND, tympanic, no rebound or guarding   EXT; no pedal edema, no cyanosis  NEURO: AAOX3, no new focal deficits     LABS/ IMAGING                        14.2   9.41  )-----------( 245      ( 05 Aug 2023 06:58 )             41.5     CARDIAC MARKERS ( 05 Aug 2023 06:58 )  x     / <0.01 ng/mL / 59 U/L / x     / 3.9 ng/mL  CARDIAC MARKERS ( 04 Aug 2023 20:15 )  x     / <0.01 ng/mL / x     / x     / x          08-05    143  |  101  |  44<H>  ----------------------------<  76  4.1   |  33<H>  |  1.4    Ca    9.8      05 Aug 2023 06:58  Mg     2.2     08-05    TPro  6.3  /  Alb  3.8  /  TBili  0.7  /  DBili  x   /  AST  17  /  ALT  25  /  AlkPhos  52  08-05    CAPILLARY BLOOD GLUCOSE      POCT Blood Glucose.: 94 mg/dL (05 Aug 2023 11:32)  POCT Blood Glucose.: 80 mg/dL (05 Aug 2023 07:37)    Urinalysis Basic - ( 05 Aug 2023 06:58 )    Color: x / Appearance: x / SG: x / pH: x  Gluc: 76 mg/dL / Ketone: x  / Bili: x / Urobili: x   Blood: x / Protein: x / Nitrite: x   Leuk Esterase: x / RBC: x / WBC x   Sq Epi: x / Non Sq Epi: x / Bacteria: x      LIVER FUNCTIONS - ( 05 Aug 2023 06:58 )  Alb: 3.8 g/dL / Pro: 6.3 g/dL / ALK PHOS: 52 U/L / ALT: 25 U/L / AST: 17 U/L / GGT: x

## 2023-08-06 ENCOUNTER — TRANSCRIPTION ENCOUNTER (OUTPATIENT)
Age: 78
End: 2023-08-06

## 2023-08-06 LAB
ANION GAP SERPL CALC-SCNC: 9 MMOL/L — SIGNIFICANT CHANGE UP (ref 7–14)
BASOPHILS # BLD AUTO: 0.03 K/UL — SIGNIFICANT CHANGE UP (ref 0–0.2)
BASOPHILS NFR BLD AUTO: 0.4 % — SIGNIFICANT CHANGE UP (ref 0–1)
BUN SERPL-MCNC: 38 MG/DL — HIGH (ref 10–20)
CALCIUM SERPL-MCNC: 9.5 MG/DL — SIGNIFICANT CHANGE UP (ref 8.4–10.5)
CHLORIDE SERPL-SCNC: 100 MMOL/L — SIGNIFICANT CHANGE UP (ref 98–110)
CHOLEST SERPL-MCNC: 192 MG/DL — SIGNIFICANT CHANGE UP
CO2 SERPL-SCNC: 34 MMOL/L — HIGH (ref 17–32)
CREAT SERPL-MCNC: 1.3 MG/DL — SIGNIFICANT CHANGE UP (ref 0.7–1.5)
EGFR: 42 ML/MIN/1.73M2 — LOW
EOSINOPHIL # BLD AUTO: 0.21 K/UL — SIGNIFICANT CHANGE UP (ref 0–0.7)
EOSINOPHIL NFR BLD AUTO: 2.7 % — SIGNIFICANT CHANGE UP (ref 0–8)
GLUCOSE BLDC GLUCOMTR-MCNC: 104 MG/DL — HIGH (ref 70–99)
GLUCOSE BLDC GLUCOMTR-MCNC: 124 MG/DL — HIGH (ref 70–99)
GLUCOSE BLDC GLUCOMTR-MCNC: 97 MG/DL — SIGNIFICANT CHANGE UP (ref 70–99)
GLUCOSE SERPL-MCNC: 108 MG/DL — HIGH (ref 70–99)
HCT VFR BLD CALC: 42.2 % — SIGNIFICANT CHANGE UP (ref 37–47)
HDLC SERPL-MCNC: 58 MG/DL — SIGNIFICANT CHANGE UP
HGB BLD-MCNC: 14.2 G/DL — SIGNIFICANT CHANGE UP (ref 12–16)
IMM GRANULOCYTES NFR BLD AUTO: 0.4 % — HIGH (ref 0.1–0.3)
LIPID PNL WITH DIRECT LDL SERPL: 87 MG/DL — SIGNIFICANT CHANGE UP
LYMPHOCYTES # BLD AUTO: 1.72 K/UL — SIGNIFICANT CHANGE UP (ref 1.2–3.4)
LYMPHOCYTES # BLD AUTO: 21.9 % — SIGNIFICANT CHANGE UP (ref 20.5–51.1)
MCHC RBC-ENTMCNC: 29.2 PG — SIGNIFICANT CHANGE UP (ref 27–31)
MCHC RBC-ENTMCNC: 33.6 G/DL — SIGNIFICANT CHANGE UP (ref 32–37)
MCV RBC AUTO: 86.7 FL — SIGNIFICANT CHANGE UP (ref 81–99)
MONOCYTES # BLD AUTO: 0.8 K/UL — HIGH (ref 0.1–0.6)
MONOCYTES NFR BLD AUTO: 10.2 % — HIGH (ref 1.7–9.3)
NEUTROPHILS # BLD AUTO: 5.06 K/UL — SIGNIFICANT CHANGE UP (ref 1.4–6.5)
NEUTROPHILS NFR BLD AUTO: 64.4 % — SIGNIFICANT CHANGE UP (ref 42.2–75.2)
NON HDL CHOLESTEROL: 134 MG/DL — HIGH
NRBC # BLD: 0 /100 WBCS — SIGNIFICANT CHANGE UP (ref 0–0)
PLATELET # BLD AUTO: 223 K/UL — SIGNIFICANT CHANGE UP (ref 130–400)
PMV BLD: 10.1 FL — SIGNIFICANT CHANGE UP (ref 7.4–10.4)
POTASSIUM SERPL-MCNC: 3.9 MMOL/L — SIGNIFICANT CHANGE UP (ref 3.5–5)
POTASSIUM SERPL-SCNC: 3.9 MMOL/L — SIGNIFICANT CHANGE UP (ref 3.5–5)
RBC # BLD: 4.87 M/UL — SIGNIFICANT CHANGE UP (ref 4.2–5.4)
RBC # FLD: 14.6 % — HIGH (ref 11.5–14.5)
SODIUM SERPL-SCNC: 143 MMOL/L — SIGNIFICANT CHANGE UP (ref 135–146)
TRIGL SERPL-MCNC: 236 MG/DL — HIGH
WBC # BLD: 7.85 K/UL — SIGNIFICANT CHANGE UP (ref 4.8–10.8)
WBC # FLD AUTO: 7.85 K/UL — SIGNIFICANT CHANGE UP (ref 4.8–10.8)

## 2023-08-06 PROCEDURE — 93018 CV STRESS TEST I&R ONLY: CPT

## 2023-08-06 PROCEDURE — 93016 CV STRESS TEST SUPVJ ONLY: CPT

## 2023-08-06 PROCEDURE — 99232 SBSQ HOSP IP/OBS MODERATE 35: CPT

## 2023-08-06 PROCEDURE — 78452 HT MUSCLE IMAGE SPECT MULT: CPT | Mod: 26

## 2023-08-06 RX ORDER — DIPHENHYDRAMINE HYDROCHLORIDE AND LIDOCAINE HYDROCHLORIDE AND ALUMINUM HYDROXIDE AND MAGNESIUM HYDRO
30 KIT ONCE
Refills: 0 | Status: DISCONTINUED | OUTPATIENT
Start: 2023-08-06 | End: 2023-08-08

## 2023-08-06 RX ADMIN — Medication 10 MILLIEQUIVALENT(S): at 14:28

## 2023-08-06 RX ADMIN — REGADENOSON 0.4 MILLIGRAM(S): 0.08 INJECTION, SOLUTION INTRAVENOUS at 11:44

## 2023-08-06 RX ADMIN — SIMVASTATIN 40 MILLIGRAM(S): 20 TABLET, FILM COATED ORAL at 22:28

## 2023-08-06 RX ADMIN — ENOXAPARIN SODIUM 60 MILLIGRAM(S): 100 INJECTION SUBCUTANEOUS at 18:14

## 2023-08-06 RX ADMIN — SODIUM CHLORIDE 50 MILLILITER(S): 9 INJECTION, SOLUTION INTRAVENOUS at 20:04

## 2023-08-06 RX ADMIN — LOSARTAN POTASSIUM 100 MILLIGRAM(S): 100 TABLET, FILM COATED ORAL at 06:50

## 2023-08-06 RX ADMIN — Medication 30 MILLILITER(S): at 20:04

## 2023-08-06 RX ADMIN — CLOPIDOGREL BISULFATE 75 MILLIGRAM(S): 75 TABLET, FILM COATED ORAL at 14:28

## 2023-08-06 RX ADMIN — Medication 25 MILLIGRAM(S): at 18:14

## 2023-08-06 RX ADMIN — Medication 25 MICROGRAM(S): at 06:50

## 2023-08-06 RX ADMIN — PANTOPRAZOLE SODIUM 40 MILLIGRAM(S): 20 TABLET, DELAYED RELEASE ORAL at 06:50

## 2023-08-06 RX ADMIN — Medication 25 MILLIGRAM(S): at 06:50

## 2023-08-06 NOTE — PROGRESS NOTE ADULT - ASSESSMENT
MEDICATIONS  (STANDING):  amLODIPine   Tablet 10 milliGRAM(s) Oral daily  clopidogrel Tablet 75 milliGRAM(s) Oral daily  dextrose 5%. 1000 milliLiter(s) (50 mL/Hr) IV Continuous <Continuous>  dextrose 5%. 1000 milliLiter(s) (100 mL/Hr) IV Continuous <Continuous>  dextrose 50% Injectable 25 Gram(s) IV Push once  dextrose 50% Injectable 12.5 Gram(s) IV Push once  dextrose 50% Injectable 25 Gram(s) IV Push once  enoxaparin Injectable 60 milliGRAM(s) SubCutaneous every 12 hours  glucagon  Injectable 1 milliGRAM(s) IntraMuscular once  hydrochlorothiazide 25 milliGRAM(s) Oral daily  insulin lispro (ADMELOG) corrective regimen sliding scale   SubCutaneous every 6 hours  lactated ringers. 1000 milliLiter(s) (50 mL/Hr) IV Continuous <Continuous>  levothyroxine 25 MICROGram(s) Oral daily  losartan 100 milliGRAM(s) Oral daily  metoprolol tartrate 25 milliGRAM(s) Oral two times a day  pantoprazole    Tablet 40 milliGRAM(s) Oral before breakfast  simvastatin 40 milliGRAM(s) Oral at bedtime    MEDICATIONS  (PRN):  dextrose Oral Gel 15 Gram(s) Oral once PRN Blood Glucose LESS THAN 70 milliGRAM(s)/deciliter  ondansetron Injectable 4 milliGRAM(s) IV Push four times a day PRN Nausea and/or Vomiting      Assessment and Plan  Chest pain  hx/o CAD/cabg 2005 hx/o MI 2020 stent  - trop x 3 -ve, resume diet,  NPO after MN, d/w cardiology team, possibly angiography in am.   - cont asa, plavix, metoprolol, zocor   - changed eliquis to lovenox for now  - cardiology following   - changed pepcid to ppi po daily   - stress test: 1. Small reversible defect involving the inferolateral wall of the left   ventricle as described above.  2. Normal resting left ventricular wall motion and wall thickening.  3. Calculated left ventricular ejection fraction of 64 % within the range   of normal.      DMT2 with CKD 3   - SSI Q6H   - stable     P. Atrial fibrillation   - eliquis change to lovenox 1 mg/kg BID for now   - rate controlled, cont metoprolol 25 mg bid     HTN  - cont metoprolol bid, HCTZ, losartan and norvasc     Hypothyroidism   - cont synthroid     Chronic back pain/ Hx/o fibromyalgia  - cont robaxin, at home on percocet.      DVT/GI px  - Lovenox 1 mg/kg bid/ pepcid     Full code                MEDICATIONS  (STANDING):  amLODIPine   Tablet 10 milliGRAM(s) Oral daily  clopidogrel Tablet 75 milliGRAM(s) Oral daily  dextrose 5%. 1000 milliLiter(s) (50 mL/Hr) IV Continuous <Continuous>  dextrose 5%. 1000 milliLiter(s) (100 mL/Hr) IV Continuous <Continuous>  dextrose 50% Injectable 25 Gram(s) IV Push once  dextrose 50% Injectable 12.5 Gram(s) IV Push once  dextrose 50% Injectable 25 Gram(s) IV Push once  enoxaparin Injectable 60 milliGRAM(s) SubCutaneous every 12 hours  glucagon  Injectable 1 milliGRAM(s) IntraMuscular once  hydrochlorothiazide 25 milliGRAM(s) Oral daily  insulin lispro (ADMELOG) corrective regimen sliding scale   SubCutaneous every 6 hours  lactated ringers. 1000 milliLiter(s) (50 mL/Hr) IV Continuous <Continuous>  levothyroxine 25 MICROGram(s) Oral daily  losartan 100 milliGRAM(s) Oral daily  metoprolol tartrate 25 milliGRAM(s) Oral two times a day  pantoprazole    Tablet 40 milliGRAM(s) Oral before breakfast  simvastatin 40 milliGRAM(s) Oral at bedtime    MEDICATIONS  (PRN):  dextrose Oral Gel 15 Gram(s) Oral once PRN Blood Glucose LESS THAN 70 milliGRAM(s)/deciliter  ondansetron Injectable 4 milliGRAM(s) IV Push four times a day PRN Nausea and/or Vomiting      Assessment and Plan  Chest pain  hx/o CAD/cabg 2005 hx/o MI 2020 stent  - trop x 3 -ve, resume diet,  NPO after MN, d/w cardiology team, possibly angiography in am.   - cont asa, plavix, metoprolol, zocor   - changed eliquis to lovenox for now  - cardiology following   - changed pepcid to ppi po daily   - stress test: 1. Small reversible defect involving the inferolateral wall of the left   ventricle as described above.  2. Normal resting left ventricular wall motion and wall thickening.  3. Calculated left ventricular ejection fraction of 64 % within the range   of normal.  - a1c pending, LDL 87      DMT2 with CKD 3   - SSI Q6H   - stable     P. Atrial fibrillation   - eliquis change to lovenox 1 mg/kg BID for now   - rate controlled, cont metoprolol 25 mg bid     HTN  - cont metoprolol bid, HCTZ, losartan and norvasc     Hypothyroidism   - cont synthroid     Chronic back pain/ Hx/o fibromyalgia  - cont robaxin, at home on percocet.      DVT/GI px  - Lovenox 1 mg/kg bid/ pepcid     Full code

## 2023-08-06 NOTE — DISCHARGE NOTE PROVIDER - HOSPITAL COURSE
78 yo female whose PMH includes CAD (CABG in 2005, stent 3 years ago) CKD stage 3,  hypothyroidism, DM, PAF, and  HTN presents to the ER due to intermittent squeezing gastric pains which radiate to her chest and back since 1 PM today.  Pt admitted for Chest pain/gastric squeezing pain hx/o CAD/cabg 2005 hx/o MI 2020 stent. Pt monitored on telemetry, seen by cardio, trop negative x 3, pt went for albaro scan pending. Pt to follow up with cardiology as outpt. Pt to follow up with her GI for EGD next month.      Pt admitted for Chest pain/gastric squeezing pain hx/o CAD/cabg 2005 hx/o MI 2020 stent. Pt monitored on telemetry, seen by cardio, trop negative x 3, pt went for alabro scan pending. Pt to follow up with cardiology as outpt. Pt to follow up with her GI for EGD next month.     Patient is a 76yo F w. PMHx of CAD (CABG 20 2005, stent 3 years ago), CKDIII, hypothyroidism, DM, PAD, HTN presenting with squeezing gastric pains radiating to chest and back.     #Chest pain  hx/o CAD/cabg 2005 hx/o MI 2020 stent  - trop x 3 negative   - cont asa, plavix, metoprolol, zocor   - changed eliquis to lovenox for now. Will restart Eliquis upon discharge   - cardiology following - will start on ranexa 500mg BID    - changed pepcid to ppi po daily   - changed atacand 32mg tablet once daily to 1/2 table once daily.   - stress test:   1. Small reversible defect involving the inferolateral wall of the left   ventricle as described above.  2. Normal resting left ventricular wall motion and wall thickening.  3. Calculated left ventricular ejection fraction of 64 % within the range   of normal.    #DMT2 with CKD 3   - SSI Q6H   - stable     #P. Atrial fibrillation   - Eliquis change to Lovenox 1 mg/kg BID for now   - rate controlled, cont metoprolol 25 mg bid   - Eliquis restarted upon dc    #Hypokalemia  - replenished   - resolved     #Hypomagnesemia  - replenished     # HTN  - cont metoprolol bid, HCTZ, losartan and Norvasc     #Hypothyroidism   - cont synthroid     #Chronic back pain/ Hx/o fibromyalgia  - cont Robaxin, at home on percocet.      Notified by attending that patient is medically stable to be discharged home.  Patient will f/u with PCP and cardiology. Patient in agreement.  VSS  Will discharge patient. Pt admitted for Chest pain/gastric squeezing pain hx/o CAD/cabg 2005 hx/o MI 2020 stent. Pt monitored on telemetry, seen   by cardio, trop negative x 3.  Pt to follow up with cardiology as outpt. Pt to follow up with her GI for EGD next month.     Patient is a 78yo F w. PMHx of CAD (CABG 20 2005, stent 3 years ago), CKDIII, hypothyroidism, DM, PAD, HTN presenting with squeezing gastric pains radiating to chest and back.     Chest pain likely angina chest pain.  hx/o CAD/cabg 2005 hx/o MI 2020 stent  - trop x 3 negative, - cont asa, plavix, metoprolol, zocor   - cardiology consulted recommended to start ranexa 500mg BID    - changed pepcid to ppi po daily   - changed atacand 32mg tablet once daily to 1/2 table once daily.   - stress test:   1. Small reversible defect involving the inferolateral wall of the left   ventricle as described above.  2. Normal resting left ventricular wall motion and wall thickening.  3. Calculated left ventricular ejection fraction of 64 % within the range   of normal.  - follow up with Dr. Marte in 2 weeks.   -   a1c 7% , LDL 87      GERD  - start protonix before breakfast daily.   - plan to follow up with GI in 1 month for upper endoscopy.     DMT2 with CKD 3   - SSI Q6H   - stable, A1c 7%   - can continue glucotrol xl at home.     P. Atrial fibrillation    - rate controlled, cont metoprolol 25 mg bid   - continue eliquis     Hypokalemia  - replenished   - resolved     Hypomagnesemia  - replenished     HTN  - cont metoprolol bid, HCTZ, losartan and Norvasc     Hypothyroidism   - cont synthroid     Chronic back pain/ Hx/o fibromyalgia  - cont Robaxin, at home on percocet.      Patient's overall condition improved and is being discharged in stable condition home.

## 2023-08-06 NOTE — DISCHARGE NOTE PROVIDER - CARE PROVIDER_API CALL
Faustino Marte  Cardiovascular Disease  [address] [place] [address]  Phone: [phone]  Fax: [phone]  Follow Up Time: 1 week   Faustino Marte  Cardiovascular Disease  [address] [place] [id number]  Phone: [phone]  Fax: [phone]  Follow Up Time: 1 week    Chris Moreno  Internal Medicine  [address] [place] [address]  Phone: [phone]  Fax: [phone]  Follow Up Time: 2 weeks   Faustino Marte  Cardiovascular Disease  [address] [place] [id number]  Phone: [phone]  Fax: [phone]  Follow Up Time: 2 weeks    Chris Moreno  Internal Medicine  [address] [place] [address]  Phone: [phone]  Fax: [phone]  Follow Up Time: 1 week

## 2023-08-06 NOTE — DISCHARGE NOTE PROVIDER - NSDCCPCAREPLAN_GEN_ALL_CORE_FT
PRINCIPAL DISCHARGE DIAGNOSIS  Diagnosis: Chest pain  Assessment and Plan of Treatment: you were monitored on telemetry, troponin negative x 2, you were seen by cardiolgy, you had Jagruti scan. Follow up with cardiology as oupt.   Follow up with your GI for EGD.        PRINCIPAL DISCHARGE DIAGNOSIS  Diagnosis: Chest pain  Assessment and Plan of Treatment: - You were seen and treated by the medical team for chest pain.  - Cardiac enzymes negative  - Stress Test done:  1. Small reversible defect involving the inferolateral wall of the left   ventricle as described above.  2. Normal resting left ventricular wall motion and wall thickening.  3. Calculated left ventricular ejection fraction of 64 % within the range   of normal.  - Cardiology on board. Outpatient FU w. cardiology recommended  - Were started on Ranexa 500mg BID.  - Will decrease home med- atacand 32mg table once daily to 1/2 tablet once daily.  - continue aspirin, plavix, metoprolol, zocor   - Routine follow up with PCP and cardiology recommended  - If feve, chills, nasuea, vomititng, chest pain, abdominal pain, please report back to the ED.        PRINCIPAL DISCHARGE DIAGNOSIS  Diagnosis: Unstable angina pectoris  Assessment and Plan of Treatment: Chest pain likely angina chest pain.  hx/o CAD/cabg 2005 hx/o MI 2020 stent  - trop x 3 negative, - cont asa, plavix, metoprolol, zocor   - cardiology consulted recommended to start ranexa 500mg BID    - changed pepcid to ppi po daily   - changed atacand 32mg tablet once daily to 1/2 table once daily.   - stress test:   1. Small reversible defect involving the inferolateral wall of the left   ventricle as described above.  2. Normal resting left ventricular wall motion and wall thickening.  3. Calculated left ventricular ejection fraction of 64 % within the range   of normal.  - follow up with Dr. Marte in 2 weeks.   -   a1c 7% , LDL 87      SECONDARY DISCHARGE DIAGNOSES  Diagnosis: GERD (gastroesophageal reflux disease)  Assessment and Plan of Treatment: GERD  - start protonix before breakfast daily.   - plan to follow up with GI in 1 month for upper endoscopy.       Diagnosis: Type 2 diabetes mellitus with nephropathy  Assessment and Plan of Treatment: DMT2 with CKD 3   - SSI Q6H   - stable, A1c 7%   - can continue glucotrol xl at home.    Diagnosis: Paroxysmal atrial fibrillation  Assessment and Plan of Treatment: P. Atrial fibrillation    - rate controlled, cont metoprolol 25 mg bid   - continue eliquis       Diagnosis: Hypokalemia  Assessment and Plan of Treatment: Hypokalemia  - replenished   - resolved    Diagnosis: Hypomagnesemia  Assessment and Plan of Treatment: Hypomagnesemia  - replenished

## 2023-08-06 NOTE — DISCHARGE NOTE PROVIDER - CARE PROVIDERS DIRECT ADDRESSES
,[email or web address],DirectAddress_Unknown

## 2023-08-06 NOTE — PROGRESS NOTE ADULT - ASSESSMENT
76 y/o f pmh CAD CABG (2005) s/p pci w/ 4 stents 08/2020, HTN, HLD, DM, pAfib on Eliquis, CKD, degenerative disc disease presents for epigastric pain.    Initial troponin: <0.01 x3  ecg: NSR IRBB, no acute ST changes    Impression  # Atypical chest pain; ACS ruled out    Plan  - currently hd stable, no chest pain or SOB, no overt ADHF on exam  - no events on tele  - maintain npo from [place] for Jagruti NM stress at [facility] this am; no caffeinated beverages  - switch Eliquis to Lovenox for now incase intervention needed  - If no acute abnormality on Jagruti nuclear, then cleared for dc from cardiac standpoint, f/u with Dr Marte  - continue Plavix and AC  - c/w BB/Statin/ARB

## 2023-08-06 NOTE — DISCHARGE NOTE PROVIDER - NSDCFUSCHEDAPPT_GEN_ALL_CORE_FT
[facility]  CARDIOLOGY [address]  Scheduled Appointment: 09/13/2023    Savannah Cedeno  [facility]  CARDIOLOGY [address]  Scheduled Appointment: 11/01/2023

## 2023-08-06 NOTE — PROGRESS NOTE ADULT - SUBJECTIVE AND OBJECTIVE BOX
Subjective/Objective:     pt feeling better this am, no overnight events, awaiting pick-up for stress test at [facility]     MEDICATIONS  (STANDING):  amLODIPine   Tablet 10 milliGRAM(s) Oral daily  clopidogrel Tablet 75 milliGRAM(s) Oral daily  dextrose 5%. 1000 milliLiter(s) (50 mL/Hr) IV Continuous <Continuous>  dextrose 5%. 1000 milliLiter(s) (100 mL/Hr) IV Continuous <Continuous>  dextrose 50% Injectable 25 Gram(s) IV Push once  dextrose 50% Injectable 12.5 Gram(s) IV Push once  dextrose 50% Injectable 25 Gram(s) IV Push once  enoxaparin Injectable 60 milliGRAM(s) SubCutaneous every 12 hours  glucagon  Injectable 1 milliGRAM(s) IntraMuscular once  hydrochlorothiazide 25 milliGRAM(s) Oral daily  insulin lispro (ADMELOG) corrective regimen sliding scale   SubCutaneous every 6 hours  lactated ringers. 1000 milliLiter(s) (50 mL/Hr) IV Continuous <Continuous>  levothyroxine 25 MICROGram(s) Oral daily  losartan 100 milliGRAM(s) Oral daily  metoprolol tartrate 25 milliGRAM(s) Oral two times a day  pantoprazole    Tablet 40 milliGRAM(s) Oral before breakfast  potassium chloride    Tablet ER 10 milliEquivalent(s) Oral daily  regadenoson Injectable 0.4 milliGRAM(s) IV Push once  simvastatin 40 milliGRAM(s) Oral at bedtime    MEDICATIONS  (PRN):  dextrose Oral Gel 15 Gram(s) Oral once PRN Blood Glucose LESS THAN 70 milliGRAM(s)/deciliter  ondansetron Injectable 4 milliGRAM(s) IV Push four times a day PRN Nausea and/or Vomiting          Vital Signs Last 24 Hrs  T(C): 36.7 (06 Aug 2023 09:52), Max: 36.7 (06 Aug 2023 05:08)  T(F): 98 (06 Aug 2023 09:52), Max: 98.1 (06 Aug 2023 05:08)  HR: 63 (06 Aug 2023 09:52) (58 - 69)  BP: 128/86 (06 Aug 2023 09:52) (105/51 - 130/62)  BP(mean): --  RR: 19 (06 Aug 2023 09:52) (16 - 19)  SpO2: 100% (06 Aug 2023 09:52) (97% - 100%)    Parameters below as of 06 Aug 2023 09:52  Patient On (Oxygen Delivery Method): room air      I&O's Detail          GENERAL:  78y/o Female NAD, resting comfortably.  HEAD:  Atraumatic, Normocephalic  EYES: EOMI, PERRLA, conjunctiva and sclera clear  NECK: Supple, No JVD, no cervical lymphadenopathy, non-tender  CHEST/LUNG: Clear to auscultation bilaterally; No wheeze, rhonchi, or rales  HEART: Regular rate and rhythm; S1&S2  ABDOMEN: Soft, Nontender, Nondistended x 4 quadrants; Bowel sounds present  EXTREMITIES:   Peripheral Pulses Present, No clubbing, no cyanosis, or no edema, no calf tenderness  PSYCH: AAOx3, cooperative, appropriate  NEUROLOGY: WNL  SKIN: WNL      EKG/ TELEM:    LABS:                          14.2   7.85  )-----------( 223      ( 06 Aug 2023 07:09 )             42.2       06 Aug 2023 07:09    143    |  100    |  38<H>  ----------------------------<  108<H>  3.9     |  34<H>  |  1.3    05 Aug 2023 06:58    143    |  101    |  44<H>  ----------------------------<  76     4.1     |  33<H>  |  1.4      Ca    9.5        06 Aug 2023 07:09  Ca    9.8        05 Aug 2023 06:58  Mg     2.2       05 Aug 2023 06:58  Mg     1.7<L>     04 Aug 2023 20:15    TPro  6.3    /  Alb  3.8    /  TBili  0.7    /  DBili  x      /  AST  17     /  ALT  25     /  AlkPhos  52     05 Aug 2023 06:58  TPro  6.8    /  Alb  4.1    /  TBili  0.5    /  DBili  x      /  AST  19     /  ALT  29     /  AlkPhos  58     04 Aug 2023 20:15    CARDIAC MARKERS ( 05 Aug 2023 14:52 )  x     / <0.01 ng/mL / x     / x     / x      CARDIAC MARKERS ( 05 Aug 2023 06:58 )  x     / <0.01 ng/mL / 59 U/L / x     / 3.9 ng/mL  CARDIAC MARKERS ( 04 Aug 2023 20:15 )  x     / <0.01 ng/mL / x     / x     / x            Creatine Kinase, Serum: 59 U/L (08-05-23 @ 06:58)              Diagnostic testing:        Assessment and Plan:

## 2023-08-06 NOTE — DISCHARGE NOTE PROVIDER - ATTENDING DISCHARGE PHYSICAL EXAMINATION:
VITALS:   T(C): 35.6 (08-08-23 @ 14:07), Max: 35.8 (08-08-23 @ 05:00)  HR: 65 (08-08-23 @ 14:07) (64 - 86)  BP: 96/54 (08-08-23 @ 14:07) (96/54 - 136/64)  RR: 16 (08-08-23 @ 14:07) (16 - 18)  SpO2: 96% (08-08-23 @ 14:07) (96% - 98%)    GENERAL: AAOx3   HEAD:  Atraumatic, Normocephalic  EYES: EOMI, PERRLA, no pallor   ENT:  normal oropharynx , no thyromegaly   NECK: Supple, No JVD  CHEST/LUNG: Clear to auscultation bilaterally; No rales, rhonchi, wheezing, or rubs.  Symmetric expansion   HEART:  S1 S2 RRR ; No murmurs or rubs   ABDOMEN: Soft, nontender, nondistended, BS wnl   EXTREMITIES:  No  cyanosis, or edema  NERVOUS SYSTEM:  A&Ox3, no focal deficits

## 2023-08-06 NOTE — PROGRESS NOTE ADULT - SUBJECTIVE AND OBJECTIVE BOX
MALIHA [id number]    Subjective/Interval History   - intermittent substernal and left sided chest pain     ROS  - no fever, cough or SOB.     PHYSICAL EXAM  Vital Signs Last 24 Hrs  T(C): 36.2 (06 Aug 2023 13:47), Max: 36.7 (06 Aug 2023 05:08)  T(F): 97.1 (06 Aug 2023 13:47), Max: 98.1 (06 Aug 2023 05:08)  HR: 61 (06 Aug 2023 13:47) (58 - 63)  BP: 145/666 (06 Aug 2023 13:47) (118/58 - 145/666)  BP(mean): --  RR: 16 (06 Aug 2023 13:47) (16 - 19)  SpO2: 98% (06 Aug 2023 13:47) (97% - 100%)    Parameters below as of 06 Aug 2023 13:47  Patient On (Oxygen Delivery Method): room air      GA : AAOX3, NAD   HEENT: PERRLA, EOMI  NECK: no JVD, no thyromegaly   CVS: S1 S2 no murmur no rubs no gallop  RESP: CTAB no wheeze, no rhonchi no rales  ABD: Soft, NT, ND, tympanic, no rebound or guarding   EXT; no pedal edema, no cyanosis  NEURO: AAOX3, no new focal deficits     LABS/ IMAGING                        14.2   7.85  )-----------( 223      ( 06 Aug 2023 07:09 )             42.2     CARDIAC MARKERS ( 05 Aug 2023 14:52 )  x     / <0.01 ng/mL / x     / x     / x      CARDIAC MARKERS ( 05 Aug 2023 06:58 )  x     / <0.01 ng/mL / 59 U/L / x     / 3.9 ng/mL  CARDIAC MARKERS ( 04 Aug 2023 20:15 )  x     / <0.01 ng/mL / x     / x     / x          08-06    143  |  100  |  38<H>  ----------------------------<  108<H>  3.9   |  34<H>  |  1.3    Ca    9.5      06 Aug 2023 07:09  Mg     2.2     08-05    TPro  6.3  /  Alb  3.8  /  TBili  0.7  /  DBili  x   /  AST  17  /  ALT  25  /  AlkPhos  52  08-05    CAPILLARY BLOOD GLUCOSE      POCT Blood Glucose.: 97 mg/dL (06 Aug 2023 16:32)  POCT Blood Glucose.: 104 mg/dL (06 Aug 2023 07:39)  POCT Blood Glucose.: 124 mg/dL (05 Aug 2023 21:03)    Urinalysis Basic - ( 06 Aug 2023 07:09 )    Color: x / Appearance: x / SG: x / pH: x  Gluc: 108 mg/dL / Ketone: x  / Bili: x / Urobili: x   Blood: x / Protein: x / Nitrite: x   Leuk Esterase: x / RBC: x / WBC x   Sq Epi: x / Non Sq Epi: x / Bacteria: x      LIVER FUNCTIONS - ( 05 Aug 2023 06:58 )  Alb: 3.8 g/dL / Pro: 6.3 g/dL / ALK PHOS: 52 U/L / ALT: 25 U/L / AST: 17 U/L / GGT: x

## 2023-08-06 NOTE — DISCHARGE NOTE PROVIDER - NSDCMRMEDTOKEN_GEN_ALL_CORE_FT
amLODIPine 10 mg oral tablet: 1 orally once a day  aspirin 81 mg oral capsule: 1 orally once a day  candesartan 32 mg oral tablet: 1 tab(s) orally once a day  clopidogrel 75 mg oral tablet: 1 tab(s) orally once a day  Eliquis 5 mg oral tablet: 1 orally 2 times a day  ezetimibe-simvastatin 10 mg-40 mg oral tablet: 1 tab(s) orally once a day  Glucotrol XL 5 mg oral tablet, extended release: 1 orally once a day  hydroCHLOROthiazide 25 mg oral tablet: 1 tab(s) orally once a day  levothyroxine 25 mcg (0.025 mg) oral tablet: 1 orally once a day  Metoprolol Tartrate 25 mg oral tablet: 1 orally 2 times a day  potassium chloride 10 mEq oral capsule, extended release: 1 orally once a day   amLODIPine 10 mg oral tablet: 1 orally once a day  aspirin 81 mg oral capsule: 1 orally once a day  Atacand 32 mg oral tablet: 0.5 tab(s) orally once a day  clopidogrel 75 mg oral tablet: 1 tab(s) orally once a day  Eliquis 5 mg oral tablet: 1 orally 2 times a day  ezetimibe-simvastatin 10 mg-40 mg oral tablet: 1 tab(s) orally once a day  Glucotrol XL 5 mg oral tablet, extended release: 1 orally once a day  hydroCHLOROthiazide 25 mg oral tablet: 1 tab(s) orally once a day  levothyroxine 25 mcg (0.025 mg) oral tablet: 1 orally once a day  Metoprolol Tartrate 25 mg oral tablet: 1 orally 2 times a day  potassium chloride 10 mEq oral capsule, extended release: 1 orally once a day  ranolazine 500 mg oral tablet, extended release: 1 tab(s) orally 2 times a day   amLODIPine 10 mg oral tablet: 1 orally once a day  aspirin 81 mg oral capsule: 1 orally once a day  Atacand 32 mg oral tablet: 0.5 tab(s) orally once a day  clopidogrel 75 mg oral tablet: 1 tab(s) orally once a day  Eliquis 5 mg oral tablet: 1 orally 2 times a day  ezetimibe-simvastatin 10 mg-40 mg oral tablet: 1 tab(s) orally once a day  Glucotrol XL 5 mg oral tablet, extended release: 1 orally once a day  hydroCHLOROthiazide 25 mg oral tablet: 1 tab(s) orally once a day  levothyroxine 25 mcg (0.025 mg) oral tablet: 1 orally once a day  Metoprolol Tartrate 25 mg oral tablet: 1 orally 2 times a day  potassium chloride 10 mEq oral capsule, extended release: 1 orally once a day  Protonix 40 mg oral delayed release tablet: 1 tab(s) orally once a day (before a meal)  ranolazine 500 mg oral tablet, extended release: 1 tab(s) orally 2 times a day

## 2023-08-06 NOTE — DISCHARGE NOTE PROVIDER - PROVIDER TOKENS
PROVIDER:[TOKEN:[[id number]],FOLLOWUP:[1 week]] PROVIDER:[TOKEN:[[id number]],FOLLOWUP:[1 week]],PROVIDER:[TOKEN:[[id number]],FOLLOWUP:[2 weeks]] PROVIDER:[TOKEN:[[id number]],FOLLOWUP:[2 weeks]],PROVIDER:[TOKEN:[[id number]],FOLLOWUP:[1 week]]

## 2023-08-07 LAB
A1C WITH ESTIMATED AVERAGE GLUCOSE RESULT: 7 % — HIGH (ref 4–5.6)
ANION GAP SERPL CALC-SCNC: 8 MMOL/L — SIGNIFICANT CHANGE UP (ref 7–14)
BUN SERPL-MCNC: 37 MG/DL — HIGH (ref 10–20)
CALCIUM SERPL-MCNC: 9.2 MG/DL — SIGNIFICANT CHANGE UP (ref 8.4–10.5)
CHLORIDE SERPL-SCNC: 98 MMOL/L — SIGNIFICANT CHANGE UP (ref 98–110)
CO2 SERPL-SCNC: 33 MMOL/L — HIGH (ref 17–32)
CREAT SERPL-MCNC: 1.5 MG/DL — SIGNIFICANT CHANGE UP (ref 0.7–1.5)
EGFR: 36 ML/MIN/1.73M2 — LOW
ESTIMATED AVERAGE GLUCOSE: 154 MG/DL — HIGH (ref 68–114)
GLUCOSE BLDC GLUCOMTR-MCNC: 134 MG/DL — HIGH (ref 70–99)
GLUCOSE BLDC GLUCOMTR-MCNC: 198 MG/DL — HIGH (ref 70–99)
GLUCOSE BLDC GLUCOMTR-MCNC: 243 MG/DL — HIGH (ref 70–99)
GLUCOSE BLDC GLUCOMTR-MCNC: 96 MG/DL — SIGNIFICANT CHANGE UP (ref 70–99)
GLUCOSE SERPL-MCNC: 131 MG/DL — HIGH (ref 70–99)
POTASSIUM SERPL-MCNC: 3.3 MMOL/L — LOW (ref 3.5–5)
POTASSIUM SERPL-SCNC: 3.3 MMOL/L — LOW (ref 3.5–5)
SODIUM SERPL-SCNC: 139 MMOL/L — SIGNIFICANT CHANGE UP (ref 135–146)

## 2023-08-07 PROCEDURE — 93010 ELECTROCARDIOGRAM REPORT: CPT | Mod: 77

## 2023-08-07 PROCEDURE — 93010 ELECTROCARDIOGRAM REPORT: CPT

## 2023-08-07 PROCEDURE — 99233 SBSQ HOSP IP/OBS HIGH 50: CPT

## 2023-08-07 PROCEDURE — 99232 SBSQ HOSP IP/OBS MODERATE 35: CPT

## 2023-08-07 PROCEDURE — 93306 TTE W/DOPPLER COMPLETE: CPT | Mod: 26

## 2023-08-07 RX ORDER — RANOLAZINE 500 MG/1
500 TABLET, FILM COATED, EXTENDED RELEASE ORAL
Refills: 0 | Status: DISCONTINUED | OUTPATIENT
Start: 2023-08-07 | End: 2023-08-08

## 2023-08-07 RX ORDER — POTASSIUM CHLORIDE 20 MEQ
20 PACKET (EA) ORAL ONCE
Refills: 0 | Status: COMPLETED | OUTPATIENT
Start: 2023-08-07 | End: 2023-08-07

## 2023-08-07 RX ADMIN — SIMVASTATIN 40 MILLIGRAM(S): 20 TABLET, FILM COATED ORAL at 21:16

## 2023-08-07 RX ADMIN — Medication 25 MICROGRAM(S): at 06:53

## 2023-08-07 RX ADMIN — Medication 1: at 12:02

## 2023-08-07 RX ADMIN — PANTOPRAZOLE SODIUM 40 MILLIGRAM(S): 20 TABLET, DELAYED RELEASE ORAL at 06:54

## 2023-08-07 RX ADMIN — AMLODIPINE BESYLATE 10 MILLIGRAM(S): 2.5 TABLET ORAL at 06:53

## 2023-08-07 RX ADMIN — CLOPIDOGREL BISULFATE 75 MILLIGRAM(S): 75 TABLET, FILM COATED ORAL at 11:34

## 2023-08-07 RX ADMIN — Medication 25 MILLIGRAM(S): at 06:54

## 2023-08-07 RX ADMIN — RANOLAZINE 500 MILLIGRAM(S): 500 TABLET, FILM COATED, EXTENDED RELEASE ORAL at 17:19

## 2023-08-07 RX ADMIN — Medication 25 MILLIGRAM(S): at 17:19

## 2023-08-07 RX ADMIN — LOSARTAN POTASSIUM 100 MILLIGRAM(S): 100 TABLET, FILM COATED ORAL at 06:54

## 2023-08-07 RX ADMIN — Medication 20 MILLIEQUIVALENT(S): at 11:34

## 2023-08-07 NOTE — PROGRESS NOTE ADULT - SUBJECTIVE AND OBJECTIVE BOX
Patient is a 77y old  Female who presents with a chief complaint of chest pain (06 Aug 2023 17:00)      T(F): 96.5 (08-07-23 @ 05:06), Max: 98 (08-06-23 @ 09:52)  HR: 72 (08-07-23 @ 05:06)  BP: 113/55 (08-07-23 @ 05:06)  RR: 18 (08-07-23 @ 05:06)  SpO2: 96% (08-07-23 @ 05:06) (96% - 100%)    PHYSICAL EXAM:  GENERAL: NAD, well-groomed, well-developed  HEAD:  Atraumatic, Normocephalic  EYES: EOMI, PERRLA, conjunctiva and sclera clear  ENMT: No tonsillar erythema, exudates, or enlargement; Moist mucous membranes, Good dentition, No lesions  NECK: Supple, No JVD, Normal thyroid  NERVOUS SYSTEM:  Alert & Oriented X3,  Motor Strength 5/5 B/L upper and lower extremities  CHEST/LUNG: Clear to percussion bilaterally; No rales, rhonchi, wheezing, or rubs  HEART: Regular rate and rhythm; No murmurs, rubs, or gallops  ABDOMEN: Soft, Nontender, Nondistended; Bowel sounds present  EXTREMITIES:   No clubbing, cyanosis, or edema  LYMPH: No lymphadenopathy noted  SKIN: No rashes or lesions    labs  08-06    143  |  100  |  38<H>  ----------------------------<  108<H>  3.9   |  34<H>  |  1.3    Ca    9.5      06 Aug 2023 07:09                            14.2   7.85  )-----------( 223      ( 06 Aug 2023 07:09 )             42.2               aluminum hydroxide/magnesium hydroxide/simethicone Suspension 30 milliLiter(s) Oral every 6 hours PRN  amLODIPine   Tablet 10 milliGRAM(s) Oral daily  clopidogrel Tablet 75 milliGRAM(s) Oral daily  dextrose 5%. 1000 milliLiter(s) IV Continuous <Continuous>  dextrose 5%. 1000 milliLiter(s) IV Continuous <Continuous>  dextrose 50% Injectable 25 Gram(s) IV Push once  dextrose 50% Injectable 12.5 Gram(s) IV Push once  dextrose 50% Injectable 25 Gram(s) IV Push once  dextrose Oral Gel 15 Gram(s) Oral once PRN  enoxaparin Injectable 60 milliGRAM(s) SubCutaneous every 12 hours  FIRST- Mouthwash  BLM 30 milliLiter(s) Swish and Swallow once  glucagon  Injectable 1 milliGRAM(s) IntraMuscular once  hydrochlorothiazide 25 milliGRAM(s) Oral daily  insulin lispro (ADMELOG) corrective regimen sliding scale   SubCutaneous every 6 hours  lactated ringers. 1000 milliLiter(s) IV Continuous <Continuous>  levothyroxine 25 MICROGram(s) Oral daily  losartan 100 milliGRAM(s) Oral daily  metoprolol tartrate 25 milliGRAM(s) Oral two times a day  ondansetron Injectable 4 milliGRAM(s) IV Push four times a day PRN  pantoprazole    Tablet 40 milliGRAM(s) Oral before breakfast  simvastatin 40 milliGRAM(s) Oral at bedtime

## 2023-08-07 NOTE — PROGRESS NOTE ADULT - ASSESSMENT
MEDICATIONS  (STANDING):  amLODIPine   Tablet 10 milliGRAM(s) Oral daily  clopidogrel Tablet 75 milliGRAM(s) Oral daily  dextrose 5%. 1000 milliLiter(s) (50 mL/Hr) IV Continuous <Continuous>  dextrose 5%. 1000 milliLiter(s) (100 mL/Hr) IV Continuous <Continuous>  dextrose 50% Injectable 25 Gram(s) IV Push once  dextrose 50% Injectable 12.5 Gram(s) IV Push once  dextrose 50% Injectable 25 Gram(s) IV Push once  enoxaparin Injectable 60 milliGRAM(s) SubCutaneous every 12 hours  glucagon  Injectable 1 milliGRAM(s) IntraMuscular once  hydrochlorothiazide 25 milliGRAM(s) Oral daily  insulin lispro (ADMELOG) corrective regimen sliding scale   SubCutaneous every 6 hours  lactated ringers. 1000 milliLiter(s) (50 mL/Hr) IV Continuous <Continuous>  levothyroxine 25 MICROGram(s) Oral daily  losartan 100 milliGRAM(s) Oral daily  metoprolol tartrate 25 milliGRAM(s) Oral two times a day  pantoprazole    Tablet 40 milliGRAM(s) Oral before breakfast  simvastatin 40 milliGRAM(s) Oral at bedtime    MEDICATIONS  (PRN):  dextrose Oral Gel 15 Gram(s) Oral once PRN Blood Glucose LESS THAN 70 milliGRAM(s)/deciliter  ondansetron Injectable 4 milliGRAM(s) IV Push four times a day PRN Nausea and/or Vomiting      Assessment and Plan  Chest pain  hx/o CAD/cabg 2005 hx/o MI 2020 stent  - trop x 3 -ve, resume diet,  NPO after MN, d/w cardiology team, possibly angiography in am.   - cont asa, plavix, metoprolol, zocor   - changed eliquis to lovenox for now  - cardiology following   - changed pepcid to ppi po daily   - stress test: 1. Small reversible defect involving the inferolateral wall of the left   ventricle as described above.  2. Normal resting left ventricular wall motion and wall thickening.  3. Calculated left ventricular ejection fraction of 64 % within the range   of normal.  - a1c pending, LDL 87      DMT2 with CKD 3   - SSI Q6H   - stable     P. Atrial fibrillation   - eliquis change to lovenox 1 mg/kg BID for now   - rate controlled, cont metoprolol 25 mg bid     HTN  - cont metoprolol bid, HCTZ, losartan and norvasc     Hypothyroidism   - cont synthroid     Chronic back pain/ Hx/o fibromyalgia  - cont robaxin, at home on percocet.      DVT/GI px  - Lovenox 1 mg/kg bid/ pepcid     Full code                MEDICATIONS  (STANDING):  amLODIPine   Tablet 10 milliGRAM(s) Oral daily  clopidogrel Tablet 75 milliGRAM(s) Oral daily  dextrose 5%. 1000 milliLiter(s) (50 mL/Hr) IV Continuous <Continuous>  dextrose 5%. 1000 milliLiter(s) (100 mL/Hr) IV Continuous <Continuous>  dextrose 50% Injectable 12.5 Gram(s) IV Push once  dextrose 50% Injectable 25 Gram(s) IV Push once  dextrose 50% Injectable 25 Gram(s) IV Push once  enoxaparin Injectable 60 milliGRAM(s) SubCutaneous every 12 hours  FIRST- Mouthwash  BLM 30 milliLiter(s) Swish and Swallow once  glucagon  Injectable 1 milliGRAM(s) IntraMuscular once  hydrochlorothiazide 25 milliGRAM(s) Oral daily  insulin lispro (ADMELOG) corrective regimen sliding scale   SubCutaneous every 6 hours  levothyroxine 25 MICROGram(s) Oral daily  losartan 100 milliGRAM(s) Oral daily  metoprolol tartrate 25 milliGRAM(s) Oral two times a day  pantoprazole    Tablet 40 milliGRAM(s) Oral before breakfast  ranolazine 500 milliGRAM(s) Oral two times a day  simvastatin 40 milliGRAM(s) Oral at bedtime    MEDICATIONS  (PRN):  aluminum hydroxide/magnesium hydroxide/simethicone Suspension 30 milliLiter(s) Oral every 6 hours PRN Dyspepsia  dextrose Oral Gel 15 Gram(s) Oral once PRN Blood Glucose LESS THAN 70 milliGRAM(s)/deciliter  ondansetron Injectable 4 milliGRAM(s) IV Push four times a day PRN Nausea and/or Vomiting    Assessment and Plan  Chest pain  hx/o CAD/cabg 2005 hx/o MI 2020 stent  - trop x 3 -ve, resume diet,  NPO after MN, d/w cardiology team, possibly angiography in am if pt has chest pain.    - cont asa, plavix, metoprolol, zocor   - changed eliquis to lovenox for now  - cardiology following   - changed pepcid to ppi po daily   - stress test: 1. Small reversible defect involving the inferolateral wall of the left   ventricle as described above.  2. Normal resting left ventricular wall motion and wall thickening.  3. Calculated left ventricular ejection fraction of 64 % within the range   of normal.  - a1c pending, LDL 87      DMT2 with CKD 3   - SSI Q6H   - stable     P. Atrial fibrillation   - eliquis change to lovenox 1 mg/kg BID for now   - rate controlled, cont metoprolol 25 mg bid     HTN  - cont metoprolol bid, HCTZ, losartan and norvasc     Hypothyroidism   - cont synthroid     Chronic back pain/ Hx/o fibromyalgia  - cont robaxin, at home on percocet.      DVT/GI px  - Lovenox 1 mg/kg bid/ pepcid     Full code     Anticipate dc in 1-2 days  Dispo: home              MEDICATIONS  (STANDING):  amLODIPine   Tablet 10 milliGRAM(s) Oral daily  clopidogrel Tablet 75 milliGRAM(s) Oral daily  dextrose 5%. 1000 milliLiter(s) (50 mL/Hr) IV Continuous <Continuous>  dextrose 5%. 1000 milliLiter(s) (100 mL/Hr) IV Continuous <Continuous>  dextrose 50% Injectable 12.5 Gram(s) IV Push once  dextrose 50% Injectable 25 Gram(s) IV Push once  dextrose 50% Injectable 25 Gram(s) IV Push once  enoxaparin Injectable 60 milliGRAM(s) SubCutaneous every 12 hours  FIRST- Mouthwash  BLM 30 milliLiter(s) Swish and Swallow once  glucagon  Injectable 1 milliGRAM(s) IntraMuscular once  hydrochlorothiazide 25 milliGRAM(s) Oral daily  insulin lispro (ADMELOG) corrective regimen sliding scale   SubCutaneous every 6 hours  levothyroxine 25 MICROGram(s) Oral daily  losartan 100 milliGRAM(s) Oral daily  metoprolol tartrate 25 milliGRAM(s) Oral two times a day  pantoprazole    Tablet 40 milliGRAM(s) Oral before breakfast  ranolazine 500 milliGRAM(s) Oral two times a day  simvastatin 40 milliGRAM(s) Oral at bedtime    MEDICATIONS  (PRN):  aluminum hydroxide/magnesium hydroxide/simethicone Suspension 30 milliLiter(s) Oral every 6 hours PRN Dyspepsia  dextrose Oral Gel 15 Gram(s) Oral once PRN Blood Glucose LESS THAN 70 milliGRAM(s)/deciliter  ondansetron Injectable 4 milliGRAM(s) IV Push four times a day PRN Nausea and/or Vomiting    Assessment and Plan  Chest pain  hx/o CAD/cabg 2005 hx/o MI 2020 stent  - trop x 3 -ve, resume diet,  NPO after MN, d/w cardiology team, possibly angiography in am if pt has chest pain.    - cont asa, plavix, metoprolol, zocor   - changed eliquis to lovenox for now  - cardiology following   - changed pepcid to ppi po daily   - stress test: 1. Small reversible defect involving the inferolateral wall of the left   ventricle as described above.  2. Normal resting left ventricular wall motion and wall thickening.  3. Calculated left ventricular ejection fraction of 64 % within the range   of normal.  - a1c pending, LDL 87      DMT2 with CKD 3   - SSI Q6H   - stable     P. Atrial fibrillation   - eliquis change to lovenox 1 mg/kg BID for now   - rate controlled, cont metoprolol 25 mg bid     Hypokalemia  - repleted and monitor  - check mag in am.     HTN  - cont metoprolol bid, HCTZ, losartan and norvasc     Hypothyroidism   - cont synthroid     Chronic back pain/ Hx/o fibromyalgia  - cont robaxin, at home on percocet.      DVT/GI px  - Lovenox 1 mg/kg bid/ pepcid     Full code     Anticipate dc in 1-2 days  Dispo: home

## 2023-08-07 NOTE — PROGRESS NOTE ADULT - SUBJECTIVE AND OBJECTIVE BOX
MALIHA [id number]    Subjective/Interval History       ROS    PHYSICAL EXAM  Vital Signs Last 24 Hrs  T(C): 35.8 (07 Aug 2023 05:06), Max: 36.2 (06 Aug 2023 13:47)  T(F): 96.5 (07 Aug 2023 05:06), Max: 97.1 (06 Aug 2023 13:47)  HR: 72 (07 Aug 2023 05:06) (61 - 72)  BP: 113/55 (07 Aug 2023 05:06) (113/55 - 145/66)  BP(mean): --  RR: 18 (07 Aug 2023 05:06) (16 - 18)  SpO2: 96% (07 Aug 2023 05:06) (96% - 98%)    Parameters below as of 07 Aug 2023 05:06  Patient On (Oxygen Delivery Method): room air      GA : AAOX3, NAD   HEENT: PERRLA, EOMI  NECK: no JVD, no thyromegaly   CVS: S1 S2 no murmur no rubs no gallop  RESP: CTAB no wheeze, no rhonchi no rales  ABD: Soft, NT, ND, tympanic, no rebound or guarding   : No [place], No CVA tenderness   EXT; no pedal edema, no cyanosis  MSK: No ML spinal tenderness, normal ROM   NEURO: AAOX3, no new focal deficits     LABS/ IMAGING                        14.2   7.85  )-----------( 223      ( 06 Aug 2023 07:09 )             42.2     CARDIAC MARKERS ( 05 Aug 2023 14:52 )  x     / <0.01 ng/mL / x     / x     / x          08-07    139  |  98  |  37<H>  ----------------------------<  131<H>  3.3<L>   |  33<H>  |  1.5    Ca    9.2      07 Aug 2023 07:45      CAPILLARY BLOOD GLUCOSE      POCT Blood Glucose.: 198 mg/dL (07 Aug 2023 11:44)  POCT Blood Glucose.: 134 mg/dL (07 Aug 2023 07:37)  POCT Blood Glucose.: 124 mg/dL (06 Aug 2023 20:40)  POCT Blood Glucose.: 97 mg/dL (06 Aug 2023 16:32)    Urinalysis Basic - ( 07 Aug 2023 07:45 )    Color: x / Appearance: x / SG: x / pH: x  Gluc: 131 mg/dL / Ketone: x  / Bili: x / Urobili: x   Blood: x / Protein: x / Nitrite: x   Leuk Esterase: x / RBC: x / WBC x   Sq Epi: x / Non Sq Epi: x / Bacteria: x               MALIHA LOZA 77y    Subjective/Interval History   - denied chest pain today .     ROS  - no fever.     PHYSICAL EXAM  Vital Signs Last 24 Hrs  T(C): 35.8 (07 Aug 2023 05:06), Max: 36.2 (06 Aug 2023 13:47)  T(F): 96.5 (07 Aug 2023 05:06), Max: 97.1 (06 Aug 2023 13:47)  HR: 72 (07 Aug 2023 05:06) (61 - 72)  BP: 113/55 (07 Aug 2023 05:06) (113/55 - 145/66)  BP(mean): --  RR: 18 (07 Aug 2023 05:06) (16 - 18)  SpO2: 96% (07 Aug 2023 05:06) (96% - 98%)    Parameters below as of 07 Aug 2023 05:06  Patient On (Oxygen Delivery Method): room air    GA : AAOX3, NAD   HEENT: PERRLA, EOMI  NECK: no JVD, no thyromegaly   CVS: S1 S2 no murmur no rubs no gallop  RESP: CTAB no wheeze, no rhonchi no rales  ABD: Soft, NT, ND, tympanic, no rebound or guarding   EXT; no pedal edema, no cyanosis  NEURO: AAOX3, no new focal deficits     LABS/ IMAGING                        14.2   7.85  )-----------( 223      ( 06 Aug 2023 07:09 )             42.2     CARDIAC MARKERS ( 05 Aug 2023 14:52 )  x     / <0.01 ng/mL / x     / x     / x          08-07    139  |  98  |  37<H>  ----------------------------<  131<H>  3.3<L>   |  33<H>  |  1.5    Ca    9.2      07 Aug 2023 07:45      CAPILLARY BLOOD GLUCOSE      POCT Blood Glucose.: 198 mg/dL (07 Aug 2023 11:44)  POCT Blood Glucose.: 134 mg/dL (07 Aug 2023 07:37)  POCT Blood Glucose.: 124 mg/dL (06 Aug 2023 20:40)  POCT Blood Glucose.: 97 mg/dL (06 Aug 2023 16:32)    Urinalysis Basic - ( 07 Aug 2023 07:45 )    Color: x / Appearance: x / SG: x / pH: x  Gluc: 131 mg/dL / Ketone: x  / Bili: x / Urobili: x   Blood: x / Protein: x / Nitrite: x   Leuk Esterase: x / RBC: x / WBC x   Sq Epi: x / Non Sq Epi: x / Bacteria: x

## 2023-08-07 NOTE — PROGRESS NOTE ADULT - ASSESSMENT
Patient brief chest pain at rest last night. Lexiscan positive. Similar to before. Will add ranexa. Ambulate. When stable out patient f/u . Continue meds

## 2023-08-08 ENCOUNTER — TRANSCRIPTION ENCOUNTER (OUTPATIENT)
Age: 78
End: 2023-08-08

## 2023-08-08 VITALS
RESPIRATION RATE: 16 BRPM | HEART RATE: 65 BPM | DIASTOLIC BLOOD PRESSURE: 54 MMHG | TEMPERATURE: 96 F | SYSTOLIC BLOOD PRESSURE: 96 MMHG | OXYGEN SATURATION: 96 %

## 2023-08-08 LAB
ANION GAP SERPL CALC-SCNC: 8 MMOL/L — SIGNIFICANT CHANGE UP (ref 7–14)
BUN SERPL-MCNC: 29 MG/DL — HIGH (ref 10–20)
CALCIUM SERPL-MCNC: 9.5 MG/DL — SIGNIFICANT CHANGE UP (ref 8.4–10.5)
CHLORIDE SERPL-SCNC: 98 MMOL/L — SIGNIFICANT CHANGE UP (ref 98–110)
CO2 SERPL-SCNC: 33 MMOL/L — HIGH (ref 17–32)
CREAT SERPL-MCNC: 1.5 MG/DL — SIGNIFICANT CHANGE UP (ref 0.7–1.5)
EGFR: 36 ML/MIN/1.73M2 — LOW
GLUCOSE BLDC GLUCOMTR-MCNC: 120 MG/DL — HIGH (ref 70–99)
GLUCOSE BLDC GLUCOMTR-MCNC: 156 MG/DL — HIGH (ref 70–99)
GLUCOSE SERPL-MCNC: 156 MG/DL — HIGH (ref 70–99)
MAGNESIUM SERPL-MCNC: 1.6 MG/DL — LOW (ref 1.8–2.4)
POTASSIUM SERPL-MCNC: 3.6 MMOL/L — SIGNIFICANT CHANGE UP (ref 3.5–5)
POTASSIUM SERPL-SCNC: 3.6 MMOL/L — SIGNIFICANT CHANGE UP (ref 3.5–5)
SODIUM SERPL-SCNC: 139 MMOL/L — SIGNIFICANT CHANGE UP (ref 135–146)

## 2023-08-08 PROCEDURE — 93010 ELECTROCARDIOGRAM REPORT: CPT

## 2023-08-08 PROCEDURE — 99239 HOSP IP/OBS DSCHRG MGMT >30: CPT

## 2023-08-08 RX ORDER — CANDESARTAN CILEXETIL 8 MG/1
1 TABLET ORAL
Qty: 0 | Refills: 0 | DISCHARGE

## 2023-08-08 RX ORDER — PANTOPRAZOLE SODIUM 20 MG/1
1 TABLET, DELAYED RELEASE ORAL
Qty: 30 | Refills: 0
Start: 2023-08-08 | End: 2023-09-06

## 2023-08-08 RX ORDER — MAGNESIUM SULFATE 500 MG/ML
1 VIAL (ML) INJECTION ONCE
Refills: 0 | Status: COMPLETED | OUTPATIENT
Start: 2023-08-08 | End: 2023-08-08

## 2023-08-08 RX ORDER — CANDESARTAN CILEXETIL 8 MG/1
0.5 TABLET ORAL
Qty: 0 | Refills: 0 | DISCHARGE
Start: 2023-08-08

## 2023-08-08 RX ORDER — RANOLAZINE 500 MG/1
1 TABLET, FILM COATED, EXTENDED RELEASE ORAL
Qty: 60 | Refills: 0
Start: 2023-08-08 | End: 2023-09-06

## 2023-08-08 RX ADMIN — PANTOPRAZOLE SODIUM 40 MILLIGRAM(S): 20 TABLET, DELAYED RELEASE ORAL at 06:31

## 2023-08-08 RX ADMIN — Medication 25 MILLIGRAM(S): at 06:28

## 2023-08-08 RX ADMIN — CLOPIDOGREL BISULFATE 75 MILLIGRAM(S): 75 TABLET, FILM COATED ORAL at 12:36

## 2023-08-08 RX ADMIN — Medication 25 MICROGRAM(S): at 06:27

## 2023-08-08 RX ADMIN — Medication 100 GRAM(S): at 11:11

## 2023-08-08 RX ADMIN — LOSARTAN POTASSIUM 100 MILLIGRAM(S): 100 TABLET, FILM COATED ORAL at 06:27

## 2023-08-08 RX ADMIN — RANOLAZINE 500 MILLIGRAM(S): 500 TABLET, FILM COATED, EXTENDED RELEASE ORAL at 06:27

## 2023-08-08 NOTE — DISCHARGE NOTE NURSING/CASE MANAGEMENT/SOCIAL WORK - NSDCPEFALRISK_GEN_ALL_CORE
For information on Fall & Injury Prevention, visit: [email or web address] OR  [email or web address] OR  https://www.cdc.gov/steadi/patient.html

## 2023-08-08 NOTE — DISCHARGE NOTE NURSING/CASE MANAGEMENT/SOCIAL WORK - PATIENT PORTAL LINK FT
You can access the FollowMyHealth Patient Portal offered by [facility] by registering at the following website: [email or web address]. By joining [facility]’s FollowMyHealth portal, you will also be able to view your health information using other applications (apps) compatible with our system.

## 2023-08-11 DIAGNOSIS — N18.30 CHRONIC KIDNEY DISEASE, STAGE 3 UNSPECIFIED: ICD-10-CM

## 2023-08-11 DIAGNOSIS — E78.5 HYPERLIPIDEMIA, UNSPECIFIED: ICD-10-CM

## 2023-08-11 DIAGNOSIS — M79.7 FIBROMYALGIA: ICD-10-CM

## 2023-08-11 DIAGNOSIS — Z95.1 PRESENCE OF AORTOCORONARY BYPASS GRAFT: ICD-10-CM

## 2023-08-11 DIAGNOSIS — E11.22 TYPE 2 DIABETES MELLITUS WITH DIABETIC CHRONIC KIDNEY DISEASE: ICD-10-CM

## 2023-08-11 DIAGNOSIS — E03.9 HYPOTHYROIDISM, UNSPECIFIED: ICD-10-CM

## 2023-08-11 DIAGNOSIS — Z96.631 PRESENCE OF RIGHT ARTIFICIAL WRIST JOINT: ICD-10-CM

## 2023-08-11 DIAGNOSIS — Z95.5 PRESENCE OF CORONARY ANGIOPLASTY IMPLANT AND GRAFT: ICD-10-CM

## 2023-08-11 DIAGNOSIS — I25.119 ATHEROSCLEROTIC HEART DISEASE OF NATIVE CORONARY ARTERY WITH UNSPECIFIED ANGINA PECTORIS: ICD-10-CM

## 2023-08-11 DIAGNOSIS — Z88.0 ALLERGY STATUS TO PENICILLIN: ICD-10-CM

## 2023-08-11 DIAGNOSIS — Z79.890 HORMONE REPLACEMENT THERAPY: ICD-10-CM

## 2023-08-11 DIAGNOSIS — Z90.49 ACQUIRED ABSENCE OF OTHER SPECIFIED PARTS OF DIGESTIVE TRACT: ICD-10-CM

## 2023-08-11 DIAGNOSIS — Z88.8 ALLERGY STATUS TO OTHER DRUGS, MEDICAMENTS AND BIOLOGICAL SUBSTANCES: ICD-10-CM

## 2023-08-11 DIAGNOSIS — E87.6 HYPOKALEMIA: ICD-10-CM

## 2023-08-11 DIAGNOSIS — Z79.84 LONG TERM (CURRENT) USE OF ORAL HYPOGLYCEMIC DRUGS: ICD-10-CM

## 2023-08-11 DIAGNOSIS — K21.9 GASTRO-ESOPHAGEAL REFLUX DISEASE WITHOUT ESOPHAGITIS: ICD-10-CM

## 2023-08-11 DIAGNOSIS — Z79.82 LONG TERM (CURRENT) USE OF ASPIRIN: ICD-10-CM

## 2023-08-11 DIAGNOSIS — I48.0 PAROXYSMAL ATRIAL FIBRILLATION: ICD-10-CM

## 2023-08-11 DIAGNOSIS — N17.9 ACUTE KIDNEY FAILURE, UNSPECIFIED: ICD-10-CM

## 2023-08-11 DIAGNOSIS — I12.9 HYPERTENSIVE CHRONIC KIDNEY DISEASE WITH STAGE 1 THROUGH STAGE 4 CHRONIC KIDNEY DISEASE, OR UNSPECIFIED CHRONIC KIDNEY DISEASE: ICD-10-CM

## 2023-08-11 DIAGNOSIS — M54.9 DORSALGIA, UNSPECIFIED: ICD-10-CM

## 2023-08-11 DIAGNOSIS — G89.29 OTHER CHRONIC PAIN: ICD-10-CM

## 2023-08-11 DIAGNOSIS — E11.51 TYPE 2 DIABETES MELLITUS WITH DIABETIC PERIPHERAL ANGIOPATHY WITHOUT GANGRENE: ICD-10-CM

## 2023-08-11 DIAGNOSIS — Z79.02 LONG TERM (CURRENT) USE OF ANTITHROMBOTICS/ANTIPLATELETS: ICD-10-CM

## 2023-08-11 DIAGNOSIS — Z79.01 LONG TERM (CURRENT) USE OF ANTICOAGULANTS: ICD-10-CM

## 2023-08-11 DIAGNOSIS — E83.42 HYPOMAGNESEMIA: ICD-10-CM

## 2023-08-23 ENCOUNTER — APPOINTMENT (OUTPATIENT)
Dept: CARDIOLOGY | Facility: CLINIC | Age: 78
End: 2023-08-23
Payer: MEDICARE

## 2023-08-23 VITALS
HEART RATE: 65 BPM | WEIGHT: 129 LBS | HEIGHT: 62 IN | DIASTOLIC BLOOD PRESSURE: 58 MMHG | BODY MASS INDEX: 23.74 KG/M2 | OXYGEN SATURATION: 98 % | SYSTOLIC BLOOD PRESSURE: 102 MMHG

## 2023-08-23 DIAGNOSIS — E87.6 HYPOKALEMIA: ICD-10-CM

## 2023-08-23 PROCEDURE — 99214 OFFICE O/P EST MOD 30 MIN: CPT

## 2023-08-23 RX ORDER — LEVOTHYROXINE SODIUM 0.03 MG/1
25 TABLET ORAL
Refills: 0 | Status: DISCONTINUED | COMMUNITY
End: 2023-08-23

## 2023-08-23 RX ORDER — TRAMADOL HYDROCHLORIDE 50 MG/1
50 TABLET, COATED ORAL
Qty: 60 | Refills: 0 | Status: DISCONTINUED | COMMUNITY
Start: 2021-10-05 | End: 2023-08-23

## 2023-08-23 RX ORDER — METHOTREXATE 2.5 MG/1
2.5 TABLET ORAL
Refills: 0 | Status: ACTIVE | COMMUNITY

## 2023-08-23 RX ORDER — FOLIC ACID 1 MG/1
1 TABLET ORAL
Refills: 0 | Status: ACTIVE | COMMUNITY

## 2023-08-23 RX ORDER — LIOTHYRONINE SODIUM 25 UG/1
25 TABLET ORAL
Refills: 0 | Status: DISCONTINUED | COMMUNITY
End: 2023-08-23

## 2023-08-23 NOTE — PHYSICAL EXAM
[General Appearance - Well Developed] : well developed [Normal Appearance] : normal appearance [Normal Conjunctiva] : the conjunctiva exhibited no abnormalities [Normal Oral Mucosa] : normal oral mucosa [No Oral Pallor] : no oral pallor [No Oral Cyanosis] : no oral cyanosis [JVD Elevated _____cm] : JVD elevated [unfilled] ~U cm above clavicle [Normal Jugular Venous V Waves Present] : normal jugular venous V waves present [Respiration, Rhythm And Depth] : normal respiratory rhythm and effort [Exaggerated Use Of Accessory Muscles For Inspiration] : no accessory muscle use [Auscultation Breath Sounds / Voice Sounds] : lungs were clear to auscultation bilaterally [Rhythm Regular] : regular [Normal Rate] : normal [Normal S1] : normal S1 [Normal S2] : normal S2 [No Murmur] : no murmurs heard [2+] : left 2+ [No Pitting Edema] : no pitting edema present [Abdomen Soft] : soft [Abdomen Tenderness] : non-tender [Abdomen Mass (___ Cm)] : no abdominal mass palpated [Abnormal Walk] : normal gait [Gait - Sufficient For Exercise Testing] : the gait was sufficient for exercise testing [Cyanosis, Localized] : no localized cyanosis [Nail Clubbing] : no clubbing of the fingernails [Petechial Hemorrhages (___cm)] : no petechial hemorrhages [] : no rash [Skin Color & Pigmentation] : normal skin color and pigmentation [No Venous Stasis] : no venous stasis [Skin Lesions] : no skin lesions [No Skin Ulcers] : no skin ulcer [No Xanthoma] : no  xanthoma was observed [Oriented To Time, Place, And Person] : oriented to person, place, and time [Affect] : the affect was normal [Mood] : the mood was normal [No Anxiety] : not feeling anxious [S3] : no S3 [S4] : no S4 [Right Carotid Bruit] : no bruit heard over the right carotid [Left Carotid Bruit] : no bruit heard over the left carotid [Right Femoral Bruit] : no bruit heard over the right femoral artery [Left Femoral Bruit] : no bruit heard over the left femoral artery [FreeTextEntry1] : pt walks several times a week. More muscle cramping at least one x /wk

## 2023-08-23 NOTE — REVIEW OF SYSTEMS
[Blood In The Urine] : hematuria [Joint Pain] : joint pain [Negative] : Heme/Lymph [Fever] : no fever [Weight Gain (___ Lbs)] : [unfilled] ~Ulb weight gain [Chills] : no chills [Weight Loss (___ Lbs)] : [unfilled] ~Ulb weight loss [Blurry Vision] : no blurred vision [Earache] : no earache [SOB] : no shortness of breath [Chest Discomfort] : no chest discomfort [Lower Ext Edema] : no extremity edema [Palpitations] : no palpitations [Syncope] : no syncope [Cough] : no cough [Wheezing] : no wheezing [Abdominal Pain] : no abdominal pain [Constipation] : no constipation [Rash] : no rash [Skin Lesions] : no skin lesions [Dizziness] : no dizziness [Weakness] : no weakness [Confusion] : no confusion was observed [FreeTextEntry2] : Sleeps poorly, intermittently  says she snores. Feels tired [FreeTextEntry9] : neck pain   and  back better

## 2023-08-23 NOTE — DISCUSSION/SUMMARY
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   8/20 4 stents RCA Dr Daniels, HLD,HTN, PAF MOHINI vasc 5 ,  DM, lumbar disc disease for clearance and AC management for epidural injections by Pain management Pt was for pre op clearance but has had chest pressure with and without exertion lasting 1 min several times associated with dizziness and slight ataxia. No diaphoresis. No palpitations. She is not sure if its coming from her back.Pt had equivocal Stress echo 7/22 had chest pain. Lexiscan 8/22 partial reperfusion inferolat ischemia  area of prior infarct. Dr Marte reviewed and will continue medical management on BB ARB ASA, CCB.  Instructed pt to go to ER for persistent pain. told pt to inform us of increased frequency of pain. Aware we would consider cardiac cath if pain increases .Consider adding Imdur. Pt does not want to take another med for now.  Pt sleeps poorly not sure if snores ,will refer to pulm for sleep eval. She is waiting to do that . She is having cramping in her thighs at least one/ wk . Supportive treatment ie stretching, hydration, heat prior to bed recommended. If no improvement will ck CPK and consider changing or reducing statin dose. Pt is very anxious and upset re not getting her epidural injections due to insurance delay. Anxiety treatment measures recommended deep breathing visual imagery.  Pt had a 3 min episode of  racing heart and SOB  no chest pain when she was very anxious. If any symptoms return  will need evaluation possible holter or MCOT. F/u 3 mth  Order stress and echo nexr visit , Pt with decreased EF consider entresto 4/26/23 Pt no longer w palpitations or chest pain. Only complaint is neck and LBP. Awaiting rheum eval. Seeing pain mgmt may need clearance for cervical epidural.  Will need to hold eliquis 3 days prior. Pt does not want to do any cardiac testing now. Will plan echo in July 2023 and Lexiscan in Aug. Labs ordered today. 7/26/23 Pain in neck and back better. NO chest pain.pt needs echo and lexiscan. She had positive stress test last year and needs f/u exam.  Pt needs sleep study has typical JAMILA s/s.She said she cant do test now but will in near future. Risks of JAMILA untreated discussed Pt will remain on current dose of potassium 20meq as level is still borderline low. 8/23/23 The pt is here for hosp f/u. Admitted 8/4/23 and discharged 8/6/23. Pt had squeezing upper abdominal pain radiating to her chest and back. Trops neg. Stress showed reversible defect inf lat wall.  This does not appear to be significantly changed from Lexiscan  8/19/22,The pt was started on Ranexa 500mg BID , adacand was reduced .Protonix was begun and pt was to have f/u w GI for EGD. Note A1c 7% LDL 87. Pt feels much better. She is on high daily dose of prednisone so should be on PPI for GI prevention. Will ck labs and review lipids in 1 mth pt needs LDL below 70. Consider switch to crestor.  Short term f/u 2 mths . Pt states endo Dr Collins said thryoid nodule was enlarged and needed bx. Cardio clearance letter written.

## 2023-08-23 NOTE — HISTORY OF PRESENT ILLNESS
[Preoperative Visit] : for a medical evaluation prior to surgery [Scheduled Procedure ___] : a [unfilled] [Surgeon Name ___] : surgeon: [unfilled] [Good] : Good [Chest Pain] : chest pain [Urinary Frequency] : urinary frequency [Diabetes] : diabetes [Cardiovascular Disease] : cardiovascular disease [Prior Anesthesia] : Prior anesthesia [Dyspnea] : no dyspnea [Prev Anesthesia Reaction] : no previous anesthesia reaction [Anesthesia Reaction] : no anesthesia reaction [Sudden Death] : no sudden death [FreeTextEntry1] : 78 y/o w PMH of CABG 12/05 s/p 4 stents 8/20, HLD, HTN PAF on eliquis, MOHINI vasc 5, DM stable, Severe DDD and cervical disc dse in chronic pain. Pt has h/o elevated PTH and hypothyroid followed by endo, and CKD followed by renal.  Pt has neck pain pain, back is improved. Dx with RA on MTX and 20mg prednisone daily. The pt is here for hosp f/u. Admitted 8/4/23 and discharged 8/6/23. Pt had squeezing upper abdominal pain radiating to her chest and back. Trops neg. Stress showed reversible defect inf lat wall.  This does not appear to be significantly changed from Lexiscan 8/10/20. The pt was started on Ranexa 500mg BID , adacand was reduced .Protonix was begun and pt was to have f/u w GI for EGD. Note A1c 7% LDL 87. Pt on high dose prednisone daily. Pt should be on PPI for GI protection for now.

## 2023-08-28 RX ORDER — PANTOPRAZOLE 40 MG/1
40 TABLET, DELAYED RELEASE ORAL DAILY
Qty: 90 | Refills: 3 | Status: ACTIVE | COMMUNITY
Start: 1900-01-01 | End: 1900-01-01

## 2023-08-30 ENCOUNTER — EMERGENCY (EMERGENCY)
Facility: HOSPITAL | Age: 78
LOS: 0 days | Discharge: HOME | End: 2023-08-30
Attending: EMERGENCY MEDICINE
Payer: MEDICARE

## 2023-08-30 VITALS
SYSTOLIC BLOOD PRESSURE: 128 MMHG | RESPIRATION RATE: 18 BRPM | HEART RATE: 68 BPM | OXYGEN SATURATION: 96 % | DIASTOLIC BLOOD PRESSURE: 86 MMHG

## 2023-08-30 VITALS
WEIGHT: 128.97 LBS | SYSTOLIC BLOOD PRESSURE: 170 MMHG | OXYGEN SATURATION: 99 % | HEART RATE: 84 BPM | RESPIRATION RATE: 18 BRPM | HEIGHT: 62 IN | DIASTOLIC BLOOD PRESSURE: 86 MMHG | TEMPERATURE: 98 F

## 2023-08-30 DIAGNOSIS — Z96.631 PRESENCE OF RIGHT ARTIFICIAL WRIST JOINT: Chronic | ICD-10-CM

## 2023-08-30 DIAGNOSIS — Z79.82 LONG TERM (CURRENT) USE OF ASPIRIN: ICD-10-CM

## 2023-08-30 DIAGNOSIS — Z87.19 PERSONAL HISTORY OF OTHER DISEASES OF THE DIGESTIVE SYSTEM: Chronic | ICD-10-CM

## 2023-08-30 DIAGNOSIS — Z88.0 ALLERGY STATUS TO PENICILLIN: ICD-10-CM

## 2023-08-30 DIAGNOSIS — R10.9 UNSPECIFIED ABDOMINAL PAIN: ICD-10-CM

## 2023-08-30 DIAGNOSIS — M19.90 UNSPECIFIED OSTEOARTHRITIS, UNSPECIFIED SITE: ICD-10-CM

## 2023-08-30 DIAGNOSIS — Z86.74 PERSONAL HISTORY OF SUDDEN CARDIAC ARREST: ICD-10-CM

## 2023-08-30 DIAGNOSIS — Z95.1 PRESENCE OF AORTOCORONARY BYPASS GRAFT: Chronic | ICD-10-CM

## 2023-08-30 DIAGNOSIS — Z90.49 ACQUIRED ABSENCE OF OTHER SPECIFIED PARTS OF DIGESTIVE TRACT: ICD-10-CM

## 2023-08-30 DIAGNOSIS — R42 DIZZINESS AND GIDDINESS: ICD-10-CM

## 2023-08-30 DIAGNOSIS — Z88.8 ALLERGY STATUS TO OTHER DRUGS, MEDICAMENTS AND BIOLOGICAL SUBSTANCES: ICD-10-CM

## 2023-08-30 DIAGNOSIS — R73.9 HYPERGLYCEMIA, UNSPECIFIED: ICD-10-CM

## 2023-08-30 DIAGNOSIS — I10 ESSENTIAL (PRIMARY) HYPERTENSION: ICD-10-CM

## 2023-08-30 DIAGNOSIS — I25.10 ATHEROSCLEROTIC HEART DISEASE OF NATIVE CORONARY ARTERY WITHOUT ANGINA PECTORIS: ICD-10-CM

## 2023-08-30 DIAGNOSIS — Z95.1 PRESENCE OF AORTOCORONARY BYPASS GRAFT: ICD-10-CM

## 2023-08-30 DIAGNOSIS — E11.65 TYPE 2 DIABETES MELLITUS WITH HYPERGLYCEMIA: ICD-10-CM

## 2023-08-30 DIAGNOSIS — M79.7 FIBROMYALGIA: ICD-10-CM

## 2023-08-30 DIAGNOSIS — Z98.890 OTHER SPECIFIED POSTPROCEDURAL STATES: Chronic | ICD-10-CM

## 2023-08-30 DIAGNOSIS — Z96.631 PRESENCE OF RIGHT ARTIFICIAL WRIST JOINT: ICD-10-CM

## 2023-08-30 DIAGNOSIS — Z79.02 LONG TERM (CURRENT) USE OF ANTITHROMBOTICS/ANTIPLATELETS: ICD-10-CM

## 2023-08-30 DIAGNOSIS — Z88.2 ALLERGY STATUS TO SULFONAMIDES: ICD-10-CM

## 2023-08-30 DIAGNOSIS — Z79.01 LONG TERM (CURRENT) USE OF ANTICOAGULANTS: ICD-10-CM

## 2023-08-30 LAB
ALBUMIN SERPL ELPH-MCNC: 4.2 G/DL — SIGNIFICANT CHANGE UP (ref 3.5–5.2)
ALP SERPL-CCNC: 46 U/L — SIGNIFICANT CHANGE UP (ref 30–115)
ALT FLD-CCNC: 16 U/L — SIGNIFICANT CHANGE UP (ref 0–41)
ANION GAP SERPL CALC-SCNC: 11 MMOL/L — SIGNIFICANT CHANGE UP (ref 7–14)
APPEARANCE UR: CLEAR — SIGNIFICANT CHANGE UP
AST SERPL-CCNC: 14 U/L — SIGNIFICANT CHANGE UP (ref 0–41)
BASOPHILS # BLD AUTO: 0 K/UL — SIGNIFICANT CHANGE UP (ref 0–0.2)
BASOPHILS NFR BLD AUTO: 0 % — SIGNIFICANT CHANGE UP (ref 0–1)
BILIRUB DIRECT SERPL-MCNC: <0.2 MG/DL — SIGNIFICANT CHANGE UP (ref 0–0.3)
BILIRUB INDIRECT FLD-MCNC: >0.5 MG/DL — SIGNIFICANT CHANGE UP (ref 0.2–1.2)
BILIRUB SERPL-MCNC: 0.7 MG/DL — SIGNIFICANT CHANGE UP (ref 0.2–1.2)
BILIRUB UR-MCNC: NEGATIVE — SIGNIFICANT CHANGE UP
BUN SERPL-MCNC: 47 MG/DL — HIGH (ref 10–20)
CALCIUM SERPL-MCNC: 9.8 MG/DL — SIGNIFICANT CHANGE UP (ref 8.4–10.5)
CHLORIDE SERPL-SCNC: 94 MMOL/L — LOW (ref 98–110)
CO2 SERPL-SCNC: 31 MMOL/L — SIGNIFICANT CHANGE UP (ref 17–32)
COLOR SPEC: YELLOW — SIGNIFICANT CHANGE UP
CREAT SERPL-MCNC: 1.8 MG/DL — HIGH (ref 0.7–1.5)
DIFF PNL FLD: NEGATIVE — SIGNIFICANT CHANGE UP
EGFR: 29 ML/MIN/1.73M2 — LOW
EOSINOPHIL # BLD AUTO: 0 K/UL — SIGNIFICANT CHANGE UP (ref 0–0.7)
EOSINOPHIL NFR BLD AUTO: 0 % — SIGNIFICANT CHANGE UP (ref 0–8)
GAS PNL BLDV: SIGNIFICANT CHANGE UP
GLUCOSE SERPL-MCNC: 176 MG/DL — HIGH (ref 70–99)
GLUCOSE UR QL: NEGATIVE MG/DL — SIGNIFICANT CHANGE UP
HCT VFR BLD CALC: 31.7 % — LOW (ref 37–47)
HGB BLD-MCNC: 11 G/DL — LOW (ref 12–16)
IMM GRANULOCYTES NFR BLD AUTO: 0.3 % — SIGNIFICANT CHANGE UP (ref 0.1–0.3)
KETONES UR-MCNC: NEGATIVE MG/DL — SIGNIFICANT CHANGE UP
LEUKOCYTE ESTERASE UR-ACNC: NEGATIVE — SIGNIFICANT CHANGE UP
LIDOCAIN IGE QN: 38 U/L — SIGNIFICANT CHANGE UP (ref 7–60)
LYMPHOCYTES # BLD AUTO: 0.98 K/UL — LOW (ref 1.2–3.4)
LYMPHOCYTES # BLD AUTO: 10.6 % — LOW (ref 20.5–51.1)
MCHC RBC-ENTMCNC: 29.9 PG — SIGNIFICANT CHANGE UP (ref 27–31)
MCHC RBC-ENTMCNC: 34.7 G/DL — SIGNIFICANT CHANGE UP (ref 32–37)
MCV RBC AUTO: 86.1 FL — SIGNIFICANT CHANGE UP (ref 81–99)
MONOCYTES # BLD AUTO: 0.21 K/UL — SIGNIFICANT CHANGE UP (ref 0.1–0.6)
MONOCYTES NFR BLD AUTO: 2.3 % — SIGNIFICANT CHANGE UP (ref 1.7–9.3)
NEUTROPHILS # BLD AUTO: 8.03 K/UL — HIGH (ref 1.4–6.5)
NEUTROPHILS NFR BLD AUTO: 86.8 % — HIGH (ref 42.2–75.2)
NITRITE UR-MCNC: NEGATIVE — SIGNIFICANT CHANGE UP
NRBC # BLD: 0 /100 WBCS — SIGNIFICANT CHANGE UP (ref 0–0)
PH UR: 6.5 — SIGNIFICANT CHANGE UP (ref 5–8)
PLATELET # BLD AUTO: 252 K/UL — SIGNIFICANT CHANGE UP (ref 130–400)
PMV BLD: 10.3 FL — SIGNIFICANT CHANGE UP (ref 7.4–10.4)
POTASSIUM SERPL-MCNC: 3.7 MMOL/L — SIGNIFICANT CHANGE UP (ref 3.5–5)
POTASSIUM SERPL-SCNC: 3.7 MMOL/L — SIGNIFICANT CHANGE UP (ref 3.5–5)
PROT SERPL-MCNC: 6.3 G/DL — SIGNIFICANT CHANGE UP (ref 6–8)
PROT UR-MCNC: NEGATIVE MG/DL — SIGNIFICANT CHANGE UP
RBC # BLD: 3.68 M/UL — LOW (ref 4.2–5.4)
RBC # FLD: 15.1 % — HIGH (ref 11.5–14.5)
SODIUM SERPL-SCNC: 136 MMOL/L — SIGNIFICANT CHANGE UP (ref 135–146)
SP GR SPEC: 1.01 — SIGNIFICANT CHANGE UP (ref 1–1.03)
TROPONIN T SERPL-MCNC: <0.01 NG/ML — SIGNIFICANT CHANGE UP
UROBILINOGEN FLD QL: 0.2 MG/DL — SIGNIFICANT CHANGE UP (ref 0.2–1)
WBC # BLD: 9.25 K/UL — SIGNIFICANT CHANGE UP (ref 4.8–10.8)
WBC # FLD AUTO: 9.25 K/UL — SIGNIFICANT CHANGE UP (ref 4.8–10.8)

## 2023-08-30 PROCEDURE — 85018 HEMOGLOBIN: CPT

## 2023-08-30 PROCEDURE — 80076 HEPATIC FUNCTION PANEL: CPT

## 2023-08-30 PROCEDURE — 93005 ELECTROCARDIOGRAM TRACING: CPT

## 2023-08-30 PROCEDURE — 74177 CT ABD & PELVIS W/CONTRAST: CPT | Mod: MA

## 2023-08-30 PROCEDURE — 99285 EMERGENCY DEPT VISIT HI MDM: CPT

## 2023-08-30 PROCEDURE — 84484 ASSAY OF TROPONIN QUANT: CPT

## 2023-08-30 PROCEDURE — 36415 COLL VENOUS BLD VENIPUNCTURE: CPT

## 2023-08-30 PROCEDURE — 87086 URINE CULTURE/COLONY COUNT: CPT

## 2023-08-30 PROCEDURE — 70450 CT HEAD/BRAIN W/O DYE: CPT | Mod: 26,MA,59

## 2023-08-30 PROCEDURE — 80048 BASIC METABOLIC PNL TOTAL CA: CPT

## 2023-08-30 PROCEDURE — 70450 CT HEAD/BRAIN W/O DYE: CPT | Mod: MA

## 2023-08-30 PROCEDURE — 84295 ASSAY OF SERUM SODIUM: CPT

## 2023-08-30 PROCEDURE — 70496 CT ANGIOGRAPHY HEAD: CPT | Mod: 26,MA

## 2023-08-30 PROCEDURE — 82803 BLOOD GASES ANY COMBINATION: CPT

## 2023-08-30 PROCEDURE — 70498 CT ANGIOGRAPHY NECK: CPT | Mod: MA

## 2023-08-30 PROCEDURE — 81003 URINALYSIS AUTO W/O SCOPE: CPT

## 2023-08-30 PROCEDURE — 82330 ASSAY OF CALCIUM: CPT

## 2023-08-30 PROCEDURE — 70496 CT ANGIOGRAPHY HEAD: CPT | Mod: MA

## 2023-08-30 PROCEDURE — 83605 ASSAY OF LACTIC ACID: CPT

## 2023-08-30 PROCEDURE — 83690 ASSAY OF LIPASE: CPT

## 2023-08-30 PROCEDURE — 71045 X-RAY EXAM CHEST 1 VIEW: CPT

## 2023-08-30 PROCEDURE — 71045 X-RAY EXAM CHEST 1 VIEW: CPT | Mod: 26

## 2023-08-30 PROCEDURE — 84132 ASSAY OF SERUM POTASSIUM: CPT

## 2023-08-30 PROCEDURE — 93010 ELECTROCARDIOGRAM REPORT: CPT

## 2023-08-30 PROCEDURE — 70498 CT ANGIOGRAPHY NECK: CPT | Mod: 26,MA

## 2023-08-30 PROCEDURE — 99285 EMERGENCY DEPT VISIT HI MDM: CPT | Mod: 25

## 2023-08-30 PROCEDURE — 85025 COMPLETE CBC W/AUTO DIFF WBC: CPT

## 2023-08-30 PROCEDURE — 74177 CT ABD & PELVIS W/CONTRAST: CPT | Mod: 26,MA

## 2023-08-30 PROCEDURE — 85014 HEMATOCRIT: CPT

## 2023-08-30 RX ORDER — SODIUM CHLORIDE 9 MG/ML
1000 INJECTION, SOLUTION INTRAVENOUS ONCE
Refills: 0 | Status: COMPLETED | OUTPATIENT
Start: 2023-08-30 | End: 2023-08-30

## 2023-08-30 RX ADMIN — SODIUM CHLORIDE 1000 MILLILITER(S): 9 INJECTION, SOLUTION INTRAVENOUS at 12:26

## 2023-08-30 NOTE — ED PROVIDER NOTE - NSICDXPASTMEDICALHX_GEN_ALL_CORE_FT
yes PAST MEDICAL HISTORY:  CAD (coronary artery disease)     Chronic midline back pain, unspecified back location     Fibromyalgia     Heart attack     HTN (hypertension)

## 2023-08-30 NOTE — ED PROVIDER NOTE - NSFOLLOWUPINSTRUCTIONS_ED_ALL_ED_FT
Follow up with a Gastroenterologist and your Primary Care Doctor    Abdominal Pain    Many things can cause abdominal pain. Usually, abdominal pain is not caused by a disease and will improve without treatment. Your health care provider will do a physical exam and possibly order blood tests and imaging to help determine the seriousness of your pain. However, in many cases, no cause may be found and you may need further testing as an outpatient. Monitor your abdominal pain for any changes.     SEEK IMMEDIATE MEDICAL CARE IF YOU HAVE THE FOLLOWING SYMPTOMS: worsening abdominal pain, vomiting, diarrhea, inability to have bowel movements or pass gas, black or bloody stool, fever accompanying chest pain or back pain, or dizziness/lightheadedness.

## 2023-08-30 NOTE — ED PROVIDER NOTE - PHYSICAL EXAMINATION
As Follows:  CONST: Well appearing in NAD  EYES: PERRL, EOMI, Sclera and conjunctiva clear.   ENT: No nasal discharge. Oropharynx normal appearing, no erythema or exudates. Uvula midline  CARD: No murmurs, rubs, or gallops; Normal rate and rhythm  RESP: BS Equal B/L, No wheezes, rhonchi or rales. No distress or accessory breathing  GI: Soft, non-tender, non-distended.  MS: Normal ROM in all extremities. No midline Cervical/Thoracic/Lumbar spinal tenderness.  SKIN: Warm, dry, no acute rashes. MMM  NEURO: A&Ox3, No focal deficits. Strength and sensation intact. Unsteady gait.

## 2023-08-30 NOTE — ED PROVIDER NOTE - ATTENDING APP SHARED VISIT CONTRIBUTION OF CARE
I have personally performed a history and physical exam on this patient and personally directed the management of the patient. Patient is a 77-year-old female past medical history of of coronary artery disease as well as hypertension bypass surgery as well as diabetes presents for evaluation of dizziness onset over the past 48 hours states that is most consistent with lightheadedness she denies any headache visual changes chest pain shortness of breath abdominal pain back pain diaphoresis no vomiting but notes nausea no diarrhea noted patient denies any visual changes tenderness or recent fevers or chills states that she was concerned with her blood glucose level being over 200    On physical exam patient is normocephalic atraumatic pupils equally round react light accommodation extraocular muscles intact oropharynx clear chest clear auscultation bilaterally abdomen soft nontender nondistended bowel sounds positive no guarding or rebound patient is able to ambulate well no focal deficits    Assessment plan we obtained EKG per my independent evaluation not consistent with STEMI not consistent with QT prolongation or arrhythmia in addition routine x-rays per my depend evaluation not consistent with pneumonia or pneumothorax patient is not a candidate for TNKase/tPA or emergent neuro intervention despite the fact that she has complained of dizziness however onset of symptoms 48 hours at this point given IV fluids meclizine in addition we obtain CT head including CTA head and neck which are negative patient does not have elevation of white blood cell count does not have an elevation in troponin not consistent with HON K urinalysis was negative in addition patient was reevaluated symptoms improved ambulating with a steady gait given negative symptoms I will discharge at this time

## 2023-08-30 NOTE — ED PROVIDER NOTE - CLINICAL SUMMARY MEDICAL DECISION MAKING FREE TEXT BOX
we obtained EKG per my independent evaluation not consistent with STEMI not consistent with QT prolongation or arrhythmia in addition routine x-rays per my depend evaluation not consistent with pneumonia or pneumothorax patient is not a candidate for TNKase/tPA or emergent neuro intervention despite the fact that she has complained of dizziness however onset of symptoms 48 hours at this point given IV fluids meclizine in addition we obtain CT head including CTA head and neck which are negative patient does not have elevation of white blood cell count does not have an elevation in troponin not consistent with HON K urinalysis was negative in addition patient was reevaluated symptoms improved ambulating with a steady gait given negative symptoms I will discharge at this time

## 2023-08-30 NOTE — ED PROVIDER NOTE - PATIENT PORTAL LINK FT
You can access the FollowMyHealth Patient Portal offered by [facility] by registering at the following website: [email or web address]. By joining [employer]’s FollowMyHealth portal, you will also be able to view your health information using other applications (apps) compatible with our system.

## 2023-08-30 NOTE — ED PROVIDER NOTE - PROGRESS NOTE DETAILS
KA - Patient reassessed and informed of all results. Will dc with outpatient GI follow up and PCP follow up. Return instructions given. KA - Patient reassessed and informed of all results. Will dc with outpatient GI follow up and PCP follow up. Return instructions given.   Reevaluation shows improved gait, fully steady without current symptoms of weakness/dizziness.

## 2023-08-30 NOTE — ED PROVIDER NOTE - OBJECTIVE STATEMENT
Patient is a 77 year old female with pmhx of htn, cabg, dm presenting for evaluation of dizziness over the past 1-2 days. She notes her blood glucose readings have been elevated above 200 but also states she was prescribed a steroid for an arthritis flare. She denies any other symptoms of fever, chills, cough, sore throat, N/V, chest pain, sob, abdominal pain, or urinary complaints.

## 2023-08-30 NOTE — ED PROVIDER NOTE - NSPTACCESSSVCSAPPTDETAILS_ED_ALL_ED_FT
please refer patient for evaluation of abd pain with drop in hgb, hemorrhoids, without any signs of active bleed or darkened stool

## 2023-08-30 NOTE — ED PROVIDER NOTE - ADDITIONAL NOTES AND INSTRUCTIONS:
Ms Huggins was seen in the Emergency Department on 8/30/23 and can return to school or work by the listed date with activity as tolerated.

## 2023-08-30 NOTE — ED ADULT NURSE NOTE - NSFALLUNIVINTERV_ED_ALL_ED
Bed/Stretcher in lowest position, wheels locked, appropriate side rails in place/Call bell, personal items and telephone in reach/Instruct patient to call for assistance before getting out of bed/chair/stretcher/Non-slip footwear applied when patient is off stretcher/[place] to call system/Physically safe environment - no spills, clutter or unnecessary equipment/Purposeful proactive rounding/Room/bathroom lighting operational, light cord in reach

## 2023-08-30 NOTE — ED ADULT TRIAGE NOTE - BEFAST SCREENING
[address]            (For Outpatient Use Only) Initial Admit Date: 2/5/2018   Inpt/Obs Admit Date: Inpt: N/A / Obs: 02/06/18   Discharge Date:    Hospital Acct:  [de-identified]   MRN: [de-identified]   CSN: [id number]        ENCOUNTER  Patient Hospital Account Financial Class: Medicare Advantage    February 6, 2018 Negative

## 2023-08-30 NOTE — ED PROVIDER NOTE - NS ED ATTENDING STATEMENT MOD
This was a shared visit with the DELROY. I reviewed and verified the documentation and independently performed the documented:

## 2023-08-31 ENCOUNTER — RX RENEWAL (OUTPATIENT)
Age: 78
End: 2023-08-31

## 2023-08-31 LAB
CULTURE RESULTS: SIGNIFICANT CHANGE UP
SPECIMEN SOURCE: SIGNIFICANT CHANGE UP

## 2023-09-05 ENCOUNTER — RX RENEWAL (OUTPATIENT)
Age: 78
End: 2023-09-05

## 2023-09-12 ENCOUNTER — RX RENEWAL (OUTPATIENT)
Age: 78
End: 2023-09-12

## 2023-09-13 ENCOUNTER — APPOINTMENT (OUTPATIENT)
Dept: CARDIOLOGY | Facility: CLINIC | Age: 78
End: 2023-09-13

## 2023-09-14 NOTE — ED ADULT TRIAGE NOTE - CCCP TRG CHIEF CMPLNT
Attempted to speak with patient.  No answer, left messages requesting callback.   see chief complaint quote

## 2023-10-29 ENCOUNTER — RX RENEWAL (OUTPATIENT)
Age: 78
End: 2023-10-29

## 2023-11-01 ENCOUNTER — APPOINTMENT (OUTPATIENT)
Dept: CARDIOLOGY | Facility: CLINIC | Age: 78
End: 2023-11-01
Payer: MEDICARE

## 2023-11-01 VITALS
HEIGHT: 62 IN | DIASTOLIC BLOOD PRESSURE: 70 MMHG | BODY MASS INDEX: 23.37 KG/M2 | SYSTOLIC BLOOD PRESSURE: 110 MMHG | HEART RATE: 71 BPM | OXYGEN SATURATION: 99 % | WEIGHT: 127 LBS

## 2023-11-01 DIAGNOSIS — G47.33 OBSTRUCTIVE SLEEP APNEA (ADULT) (PEDIATRIC): ICD-10-CM

## 2023-11-01 PROCEDURE — 99213 OFFICE O/P EST LOW 20 MIN: CPT

## 2023-11-01 RX ORDER — CLOPIDOGREL BISULFATE 75 MG/1
75 TABLET, FILM COATED ORAL
Refills: 0 | Status: DISCONTINUED | COMMUNITY
End: 2023-11-01

## 2023-11-01 RX ORDER — RISEDRONATE SODIUM 150 MG/1
150 TABLET, FILM COATED ORAL
Refills: 0 | Status: DISCONTINUED | COMMUNITY
End: 2023-11-01

## 2023-11-01 RX ORDER — LANCETS 28 GAUGE
EACH MISCELLANEOUS
Qty: 100 | Refills: 0 | Status: ACTIVE | COMMUNITY
Start: 2023-10-22

## 2023-11-01 RX ORDER — METHOCARBAMOL 750 MG/1
750 TABLET, FILM COATED ORAL
Qty: 5 | Refills: 0 | Status: DISCONTINUED | COMMUNITY
Start: 2021-08-30 | End: 2023-11-01

## 2023-11-01 RX ORDER — PREDNISONE 20 MG/1
20 TABLET ORAL
Refills: 0 | Status: DISCONTINUED | COMMUNITY
End: 2023-11-01

## 2023-11-01 RX ORDER — BLOOD SUGAR DIAGNOSTIC
STRIP MISCELLANEOUS
Qty: 100 | Refills: 0 | Status: ACTIVE | COMMUNITY
Start: 2023-10-26

## 2023-11-07 ENCOUNTER — RX RENEWAL (OUTPATIENT)
Age: 78
End: 2023-11-07

## 2023-11-29 ENCOUNTER — EMERGENCY (EMERGENCY)
Facility: HOSPITAL | Age: 78
LOS: 0 days | Discharge: HOME | End: 2023-11-29
Attending: EMERGENCY MEDICINE
Payer: MEDICARE

## 2023-11-29 VITALS
DIASTOLIC BLOOD PRESSURE: 60 MMHG | RESPIRATION RATE: 16 BRPM | OXYGEN SATURATION: 98 % | TEMPERATURE: 98 F | SYSTOLIC BLOOD PRESSURE: 126 MMHG | HEART RATE: 68 BPM

## 2023-11-29 DIAGNOSIS — Z88.0 ALLERGY STATUS TO PENICILLIN: ICD-10-CM

## 2023-11-29 DIAGNOSIS — Z87.19 PERSONAL HISTORY OF OTHER DISEASES OF THE DIGESTIVE SYSTEM: Chronic | ICD-10-CM

## 2023-11-29 DIAGNOSIS — Z88.8 ALLERGY STATUS TO OTHER DRUGS, MEDICAMENTS AND BIOLOGICAL SUBSTANCES: ICD-10-CM

## 2023-11-29 DIAGNOSIS — Z96.631 PRESENCE OF RIGHT ARTIFICIAL WRIST JOINT: Chronic | ICD-10-CM

## 2023-11-29 DIAGNOSIS — I10 ESSENTIAL (PRIMARY) HYPERTENSION: ICD-10-CM

## 2023-11-29 DIAGNOSIS — Z95.1 PRESENCE OF AORTOCORONARY BYPASS GRAFT: ICD-10-CM

## 2023-11-29 DIAGNOSIS — I25.10 ATHEROSCLEROTIC HEART DISEASE OF NATIVE CORONARY ARTERY WITHOUT ANGINA PECTORIS: ICD-10-CM

## 2023-11-29 DIAGNOSIS — Z95.1 PRESENCE OF AORTOCORONARY BYPASS GRAFT: Chronic | ICD-10-CM

## 2023-11-29 DIAGNOSIS — Z98.890 OTHER SPECIFIED POSTPROCEDURAL STATES: Chronic | ICD-10-CM

## 2023-11-29 DIAGNOSIS — N39.0 URINARY TRACT INFECTION, SITE NOT SPECIFIED: ICD-10-CM

## 2023-11-29 DIAGNOSIS — R10.9 UNSPECIFIED ABDOMINAL PAIN: ICD-10-CM

## 2023-11-29 LAB
ALBUMIN SERPL ELPH-MCNC: 4.4 G/DL — SIGNIFICANT CHANGE UP (ref 3.5–5.2)
ALBUMIN SERPL ELPH-MCNC: 4.4 G/DL — SIGNIFICANT CHANGE UP (ref 3.5–5.2)
ALP SERPL-CCNC: 61 U/L — SIGNIFICANT CHANGE UP (ref 30–115)
ALP SERPL-CCNC: 61 U/L — SIGNIFICANT CHANGE UP (ref 30–115)
ALT FLD-CCNC: 16 U/L — SIGNIFICANT CHANGE UP (ref 0–41)
ALT FLD-CCNC: 16 U/L — SIGNIFICANT CHANGE UP (ref 0–41)
ANION GAP SERPL CALC-SCNC: 12 MMOL/L — SIGNIFICANT CHANGE UP (ref 7–14)
ANION GAP SERPL CALC-SCNC: 12 MMOL/L — SIGNIFICANT CHANGE UP (ref 7–14)
APPEARANCE UR: CLEAR — SIGNIFICANT CHANGE UP
APPEARANCE UR: CLEAR — SIGNIFICANT CHANGE UP
AST SERPL-CCNC: 24 U/L — SIGNIFICANT CHANGE UP (ref 0–41)
AST SERPL-CCNC: 24 U/L — SIGNIFICANT CHANGE UP (ref 0–41)
BACTERIA # UR AUTO: ABNORMAL /HPF
BACTERIA # UR AUTO: ABNORMAL /HPF
BASOPHILS # BLD AUTO: 0.04 K/UL — SIGNIFICANT CHANGE UP (ref 0–0.2)
BASOPHILS # BLD AUTO: 0.04 K/UL — SIGNIFICANT CHANGE UP (ref 0–0.2)
BASOPHILS NFR BLD AUTO: 0.5 % — SIGNIFICANT CHANGE UP (ref 0–1)
BASOPHILS NFR BLD AUTO: 0.5 % — SIGNIFICANT CHANGE UP (ref 0–1)
BILIRUB SERPL-MCNC: 0.4 MG/DL — SIGNIFICANT CHANGE UP (ref 0.2–1.2)
BILIRUB SERPL-MCNC: 0.4 MG/DL — SIGNIFICANT CHANGE UP (ref 0.2–1.2)
BILIRUB UR-MCNC: NEGATIVE — SIGNIFICANT CHANGE UP
BILIRUB UR-MCNC: NEGATIVE — SIGNIFICANT CHANGE UP
BUN SERPL-MCNC: 31 MG/DL — HIGH (ref 10–20)
BUN SERPL-MCNC: 31 MG/DL — HIGH (ref 10–20)
CALCIUM SERPL-MCNC: 9.9 MG/DL — SIGNIFICANT CHANGE UP (ref 8.4–10.5)
CALCIUM SERPL-MCNC: 9.9 MG/DL — SIGNIFICANT CHANGE UP (ref 8.4–10.5)
CHLORIDE SERPL-SCNC: 98 MMOL/L — SIGNIFICANT CHANGE UP (ref 98–110)
CHLORIDE SERPL-SCNC: 98 MMOL/L — SIGNIFICANT CHANGE UP (ref 98–110)
CO2 SERPL-SCNC: 28 MMOL/L — SIGNIFICANT CHANGE UP (ref 17–32)
CO2 SERPL-SCNC: 28 MMOL/L — SIGNIFICANT CHANGE UP (ref 17–32)
COLOR SPEC: YELLOW — SIGNIFICANT CHANGE UP
COLOR SPEC: YELLOW — SIGNIFICANT CHANGE UP
CREAT SERPL-MCNC: 1.7 MG/DL — HIGH (ref 0.7–1.5)
CREAT SERPL-MCNC: 1.7 MG/DL — HIGH (ref 0.7–1.5)
DIFF PNL FLD: NEGATIVE — SIGNIFICANT CHANGE UP
DIFF PNL FLD: NEGATIVE — SIGNIFICANT CHANGE UP
EGFR: 31 ML/MIN/1.73M2 — LOW
EGFR: 31 ML/MIN/1.73M2 — LOW
EOSINOPHIL # BLD AUTO: 0.11 K/UL — SIGNIFICANT CHANGE UP (ref 0–0.7)
EOSINOPHIL # BLD AUTO: 0.11 K/UL — SIGNIFICANT CHANGE UP (ref 0–0.7)
EOSINOPHIL NFR BLD AUTO: 1.4 % — SIGNIFICANT CHANGE UP (ref 0–8)
EOSINOPHIL NFR BLD AUTO: 1.4 % — SIGNIFICANT CHANGE UP (ref 0–8)
EPI CELLS # UR: PRESENT
EPI CELLS # UR: PRESENT
GLUCOSE SERPL-MCNC: 71 MG/DL — SIGNIFICANT CHANGE UP (ref 70–99)
GLUCOSE SERPL-MCNC: 71 MG/DL — SIGNIFICANT CHANGE UP (ref 70–99)
GLUCOSE UR QL: NEGATIVE MG/DL — SIGNIFICANT CHANGE UP
GLUCOSE UR QL: NEGATIVE MG/DL — SIGNIFICANT CHANGE UP
HCT VFR BLD CALC: 35.4 % — LOW (ref 37–47)
HCT VFR BLD CALC: 35.4 % — LOW (ref 37–47)
HGB BLD-MCNC: 11.8 G/DL — LOW (ref 12–16)
HGB BLD-MCNC: 11.8 G/DL — LOW (ref 12–16)
IMM GRANULOCYTES NFR BLD AUTO: 0.4 % — HIGH (ref 0.1–0.3)
IMM GRANULOCYTES NFR BLD AUTO: 0.4 % — HIGH (ref 0.1–0.3)
KETONES UR-MCNC: NEGATIVE MG/DL — SIGNIFICANT CHANGE UP
KETONES UR-MCNC: NEGATIVE MG/DL — SIGNIFICANT CHANGE UP
LEUKOCYTE ESTERASE UR-ACNC: ABNORMAL
LEUKOCYTE ESTERASE UR-ACNC: ABNORMAL
LIDOCAIN IGE QN: 44 U/L — SIGNIFICANT CHANGE UP (ref 7–60)
LIDOCAIN IGE QN: 44 U/L — SIGNIFICANT CHANGE UP (ref 7–60)
LYMPHOCYTES # BLD AUTO: 1.43 K/UL — SIGNIFICANT CHANGE UP (ref 1.2–3.4)
LYMPHOCYTES # BLD AUTO: 1.43 K/UL — SIGNIFICANT CHANGE UP (ref 1.2–3.4)
LYMPHOCYTES # BLD AUTO: 17.7 % — LOW (ref 20.5–51.1)
LYMPHOCYTES # BLD AUTO: 17.7 % — LOW (ref 20.5–51.1)
MCHC RBC-ENTMCNC: 30.4 PG — SIGNIFICANT CHANGE UP (ref 27–31)
MCHC RBC-ENTMCNC: 30.4 PG — SIGNIFICANT CHANGE UP (ref 27–31)
MCHC RBC-ENTMCNC: 33.3 G/DL — SIGNIFICANT CHANGE UP (ref 32–37)
MCHC RBC-ENTMCNC: 33.3 G/DL — SIGNIFICANT CHANGE UP (ref 32–37)
MCV RBC AUTO: 91.2 FL — SIGNIFICANT CHANGE UP (ref 81–99)
MCV RBC AUTO: 91.2 FL — SIGNIFICANT CHANGE UP (ref 81–99)
MONOCYTES # BLD AUTO: 1.02 K/UL — HIGH (ref 0.1–0.6)
MONOCYTES # BLD AUTO: 1.02 K/UL — HIGH (ref 0.1–0.6)
MONOCYTES NFR BLD AUTO: 12.7 % — HIGH (ref 1.7–9.3)
MONOCYTES NFR BLD AUTO: 12.7 % — HIGH (ref 1.7–9.3)
NEUTROPHILS # BLD AUTO: 5.43 K/UL — SIGNIFICANT CHANGE UP (ref 1.4–6.5)
NEUTROPHILS # BLD AUTO: 5.43 K/UL — SIGNIFICANT CHANGE UP (ref 1.4–6.5)
NEUTROPHILS NFR BLD AUTO: 67.3 % — SIGNIFICANT CHANGE UP (ref 42.2–75.2)
NEUTROPHILS NFR BLD AUTO: 67.3 % — SIGNIFICANT CHANGE UP (ref 42.2–75.2)
NITRITE UR-MCNC: NEGATIVE — SIGNIFICANT CHANGE UP
NITRITE UR-MCNC: NEGATIVE — SIGNIFICANT CHANGE UP
NRBC # BLD: 0 /100 WBCS — SIGNIFICANT CHANGE UP (ref 0–0)
NRBC # BLD: 0 /100 WBCS — SIGNIFICANT CHANGE UP (ref 0–0)
PH UR: 6 — SIGNIFICANT CHANGE UP (ref 5–8)
PH UR: 6 — SIGNIFICANT CHANGE UP (ref 5–8)
PLATELET # BLD AUTO: 295 K/UL — SIGNIFICANT CHANGE UP (ref 130–400)
PLATELET # BLD AUTO: 295 K/UL — SIGNIFICANT CHANGE UP (ref 130–400)
PMV BLD: 10 FL — SIGNIFICANT CHANGE UP (ref 7.4–10.4)
PMV BLD: 10 FL — SIGNIFICANT CHANGE UP (ref 7.4–10.4)
POTASSIUM SERPL-MCNC: 3.8 MMOL/L — SIGNIFICANT CHANGE UP (ref 3.5–5)
POTASSIUM SERPL-MCNC: 3.8 MMOL/L — SIGNIFICANT CHANGE UP (ref 3.5–5)
POTASSIUM SERPL-SCNC: 3.8 MMOL/L — SIGNIFICANT CHANGE UP (ref 3.5–5)
POTASSIUM SERPL-SCNC: 3.8 MMOL/L — SIGNIFICANT CHANGE UP (ref 3.5–5)
PROT SERPL-MCNC: 6.6 G/DL — SIGNIFICANT CHANGE UP (ref 6–8)
PROT SERPL-MCNC: 6.6 G/DL — SIGNIFICANT CHANGE UP (ref 6–8)
PROT UR-MCNC: NEGATIVE MG/DL — SIGNIFICANT CHANGE UP
PROT UR-MCNC: NEGATIVE MG/DL — SIGNIFICANT CHANGE UP
RBC # BLD: 3.88 M/UL — LOW (ref 4.2–5.4)
RBC # BLD: 3.88 M/UL — LOW (ref 4.2–5.4)
RBC # FLD: 13.9 % — SIGNIFICANT CHANGE UP (ref 11.5–14.5)
RBC # FLD: 13.9 % — SIGNIFICANT CHANGE UP (ref 11.5–14.5)
RBC CASTS # UR COMP ASSIST: 1 /HPF — SIGNIFICANT CHANGE UP (ref 0–4)
RBC CASTS # UR COMP ASSIST: 1 /HPF — SIGNIFICANT CHANGE UP (ref 0–4)
SODIUM SERPL-SCNC: 138 MMOL/L — SIGNIFICANT CHANGE UP (ref 135–146)
SODIUM SERPL-SCNC: 138 MMOL/L — SIGNIFICANT CHANGE UP (ref 135–146)
SP GR SPEC: 1.02 — SIGNIFICANT CHANGE UP (ref 1–1.03)
SP GR SPEC: 1.02 — SIGNIFICANT CHANGE UP (ref 1–1.03)
UROBILINOGEN FLD QL: 0.2 MG/DL — SIGNIFICANT CHANGE UP (ref 0.2–1)
UROBILINOGEN FLD QL: 0.2 MG/DL — SIGNIFICANT CHANGE UP (ref 0.2–1)
WBC # BLD: 8.06 K/UL — SIGNIFICANT CHANGE UP (ref 4.8–10.8)
WBC # BLD: 8.06 K/UL — SIGNIFICANT CHANGE UP (ref 4.8–10.8)
WBC # FLD AUTO: 8.06 K/UL — SIGNIFICANT CHANGE UP (ref 4.8–10.8)
WBC # FLD AUTO: 8.06 K/UL — SIGNIFICANT CHANGE UP (ref 4.8–10.8)
WBC UR QL: 10 /HPF — HIGH (ref 0–5)
WBC UR QL: 10 /HPF — HIGH (ref 0–5)

## 2023-11-29 PROCEDURE — 80053 COMPREHEN METABOLIC PANEL: CPT

## 2023-11-29 PROCEDURE — 96374 THER/PROPH/DIAG INJ IV PUSH: CPT | Mod: XU

## 2023-11-29 PROCEDURE — 74177 CT ABD & PELVIS W/CONTRAST: CPT | Mod: MA

## 2023-11-29 PROCEDURE — 81001 URINALYSIS AUTO W/SCOPE: CPT

## 2023-11-29 PROCEDURE — 83690 ASSAY OF LIPASE: CPT

## 2023-11-29 PROCEDURE — 99285 EMERGENCY DEPT VISIT HI MDM: CPT

## 2023-11-29 PROCEDURE — 87086 URINE CULTURE/COLONY COUNT: CPT

## 2023-11-29 PROCEDURE — 85025 COMPLETE CBC W/AUTO DIFF WBC: CPT

## 2023-11-29 PROCEDURE — 74177 CT ABD & PELVIS W/CONTRAST: CPT | Mod: 26,MA

## 2023-11-29 PROCEDURE — 36415 COLL VENOUS BLD VENIPUNCTURE: CPT

## 2023-11-29 PROCEDURE — 99284 EMERGENCY DEPT VISIT MOD MDM: CPT | Mod: 25

## 2023-11-29 RX ORDER — AZTREONAM 2 G
1000 VIAL (EA) INJECTION ONCE
Refills: 0 | Status: COMPLETED | OUTPATIENT
Start: 2023-11-29 | End: 2023-11-29

## 2023-11-29 RX ORDER — SODIUM CHLORIDE 9 MG/ML
1000 INJECTION, SOLUTION INTRAVENOUS ONCE
Refills: 0 | Status: COMPLETED | OUTPATIENT
Start: 2023-11-29 | End: 2023-11-29

## 2023-11-29 RX ADMIN — SODIUM CHLORIDE 1000 MILLILITER(S): 9 INJECTION, SOLUTION INTRAVENOUS at 15:38

## 2023-11-29 RX ADMIN — Medication 50 MILLIGRAM(S): at 17:42

## 2023-11-29 NOTE — ED PROVIDER NOTE - ATTENDING APP SHARED VISIT CONTRIBUTION OF CARE
77-year-old female past medical history  noted.  Presents for evaluation of abdominal pain and right flank pain.  For the last 2 days.  No fever or chills, no nausea or vomiting, patient denies urinary frequency.  On exam patient NAD, AOx3, abdomen is soft, nontender nondistended, no CVA tenderness, no rash

## 2023-11-29 NOTE — ED PROVIDER NOTE - PATIENT PORTAL LINK FT
You can access the FollowMyHealth Patient Portal offered by [facility] by registering at the following website: [email or web address]. By joining [employer]’s FollowMyHealth portal, you will also be able to view your health information using other applications (apps) compatible with our system.

## 2023-11-29 NOTE — ED ADULT TRIAGE NOTE - CHIEF COMPLAINT QUOTE
"I have a history of Stage IV kidney disease. I have right-sided abdominal pain that travels to my back."

## 2023-11-29 NOTE — ED PROVIDER NOTE - OBJECTIVE STATEMENT
76 yo female, pmh of fibromyalgia, cad, htn, presents to er for abd pain, mild, aching, no radiation, right flank. Denies fever, chills, cp, sob, nvd, dysuria, hematuria.

## 2023-11-29 NOTE — ED ADULT NURSE NOTE - NS ED NOTE ABUSE SUSPICION NEGLECT YN
----- Message from AINSLEY Madden sent at 9/21/2022  2:16 PM EDT -----  2 episodes of SVT only 6 beats lasting only 126bpm. Can follow up with his PCP about need for cardiology referral.   No

## 2023-11-29 NOTE — ED PROVIDER NOTE - CLINICAL SUMMARY MEDICAL DECISION MAKING FREE TEXT BOX
Labs and imaging obtained.  Patient does have 10 WBCs in the urine and with some urinary symptoms.  Will start antibiotics.  Patient instructed to follow-up with PMD return if any worsening symptoms or concerns

## 2023-11-29 NOTE — ED ADULT NURSE NOTE - NSFALLUNIVINTERV_ED_ALL_ED
Bed/Stretcher in lowest position, wheels locked, appropriate side rails in place/Call bell, personal items and telephone in reach/Instruct patient to call for assistance before getting out of bed/chair/stretcher/Non-slip footwear applied when patient is off stretcher/[place] to call system/Physically safe environment - no spills, clutter or unnecessary equipment/Purposeful proactive rounding/Room/bathroom lighting operational, light cord in reach

## 2023-12-08 ENCOUNTER — APPOINTMENT (OUTPATIENT)
Dept: ORTHOPEDIC SURGERY | Facility: CLINIC | Age: 78
End: 2023-12-08
Payer: MEDICARE

## 2023-12-08 VITALS — WEIGHT: 127 LBS | BODY MASS INDEX: 23.37 KG/M2 | HEIGHT: 62 IN

## 2023-12-08 DIAGNOSIS — S80.01XA CONTUSION OF RIGHT KNEE, INITIAL ENCOUNTER: ICD-10-CM

## 2023-12-08 PROCEDURE — 73610 X-RAY EXAM OF ANKLE: CPT | Mod: RT

## 2023-12-08 PROCEDURE — L4361: CPT | Mod: RT

## 2023-12-08 PROCEDURE — 99213 OFFICE O/P EST LOW 20 MIN: CPT

## 2023-12-08 PROCEDURE — 99203 OFFICE O/P NEW LOW 30 MIN: CPT

## 2023-12-08 PROCEDURE — 73562 X-RAY EXAM OF KNEE 3: CPT | Mod: RT

## 2023-12-10 ENCOUNTER — RX RENEWAL (OUTPATIENT)
Age: 78
End: 2023-12-10

## 2023-12-10 RX ORDER — METOPROLOL TARTRATE 25 MG/1
25 TABLET, FILM COATED ORAL
Qty: 180 | Refills: 3 | Status: ACTIVE | COMMUNITY
Start: 2021-08-30 | End: 1900-01-01

## 2023-12-11 RX ORDER — APIXABAN 5 MG/1
5 TABLET, FILM COATED ORAL
Qty: 180 | Refills: 3 | Status: ACTIVE | COMMUNITY
Start: 2021-08-27 | End: 1900-01-01

## 2023-12-20 ENCOUNTER — APPOINTMENT (OUTPATIENT)
Dept: ORTHOPEDIC SURGERY | Facility: CLINIC | Age: 78
End: 2023-12-20
Payer: MEDICARE

## 2023-12-20 PROCEDURE — L4350: CPT | Mod: RT,KX

## 2023-12-20 PROCEDURE — 73610 X-RAY EXAM OF ANKLE: CPT | Mod: RT

## 2023-12-20 PROCEDURE — 99214 OFFICE O/P EST MOD 30 MIN: CPT | Mod: 25

## 2023-12-20 PROCEDURE — 27786 TREATMENT OF ANKLE FRACTURE: CPT | Mod: RT

## 2023-12-20 NOTE — HISTORY OF PRESENT ILLNESS
[de-identified] : 77-year-old patient here is 2 weeks after injuring her right ankle.  She was seen initially at an urgent care where she was diagnosed with a possible fracture and immobilized inside a boot.  The patient has been minimally weightbearing with the boot on.  She notes discomfort and pain over the lateral malleolus.  Denies any interval trauma.  Denies any fevers chills calf pain chest pain shortness of breath.

## 2023-12-20 NOTE — PHYSICAL EXAM
[de-identified] : She is alert oriented x 3.  She is pleasant cooperative throughout exam.  I examined her right lower extremity.  Her ankle is minimally tender to palpation laterally.  There is no gross displacement present.  There is some soft tissue swelling.  Grossly intact range of motion.  She is neurovascular intact.

## 2023-12-20 NOTE — DATA REVIEWED
[FreeTextEntry1] : 3 views of the patient's right ankle ordered reviewed by me personally.  The x-rays demonstrate evidence of a lateral malleolus fracture which is minimally displaced.  The ankle joint is congruent.

## 2023-12-20 NOTE — DISCUSSION/SUMMARY
[de-identified] : I discussed the patient's findings with her.  We will initiate fracture care.  She can weight-bear as tolerated with the boot on.  I will see her back in 4 weeks for repeat clinical radiographic exam.  All questions answered.

## 2024-01-08 ENCOUNTER — RX RENEWAL (OUTPATIENT)
Age: 79
End: 2024-01-08

## 2024-01-17 ENCOUNTER — APPOINTMENT (OUTPATIENT)
Dept: ORTHOPEDIC SURGERY | Facility: CLINIC | Age: 79
End: 2024-01-17
Payer: MEDICARE

## 2024-01-17 PROCEDURE — 73610 X-RAY EXAM OF ANKLE: CPT | Mod: RT

## 2024-01-17 PROCEDURE — 99024 POSTOP FOLLOW-UP VISIT: CPT

## 2024-01-17 NOTE — DISCUSSION/SUMMARY
[de-identified] : I discussed the patient's findings with her.  At this point the patient's start physical therapy.  We will wean her completely out of any assistive device.  I will see her back in 4 weeks for her next fracture care appointment.  All questions answered

## 2024-01-17 NOTE — PHYSICAL EXAM
[de-identified] : She is alert oriented x 3.  Pleasant cooperative.  I examined the right lower extremity.  She is nontender at the distal fibula.  No bony crepitus.  Full range of motion.  Neurovascular intact distally.

## 2024-01-17 NOTE — DATA REVIEWED
[FreeTextEntry1] : 3 views right ankle ordered reviewed by me personally.  X-rays demonstrate healing fracture of the right distal fibula fracture.  Fractures pretty much healed.  She has a congruent ankle mortise

## 2024-01-17 NOTE — HISTORY OF PRESENT ILLNESS
[de-identified] : Patient is here for follow-up of her right ankle.  She is doing great.  She is been ambulating without the assistive device.  Does not endorse any interval trauma.

## 2024-02-13 PROBLEM — Z87.39 HISTORY OF RHEUMATOID ARTHRITIS: Status: RESOLVED | Noted: 2021-07-19 | Resolved: 2024-02-13

## 2024-02-14 ENCOUNTER — APPOINTMENT (OUTPATIENT)
Dept: CARDIOLOGY | Facility: CLINIC | Age: 79
End: 2024-02-14
Payer: MEDICARE

## 2024-02-14 VITALS
OXYGEN SATURATION: 97 % | HEIGHT: 62 IN | DIASTOLIC BLOOD PRESSURE: 62 MMHG | WEIGHT: 125 LBS | BODY MASS INDEX: 23 KG/M2 | HEART RATE: 65 BPM | SYSTOLIC BLOOD PRESSURE: 120 MMHG

## 2024-02-14 DIAGNOSIS — Z87.39 PERSONAL HISTORY OF OTHER DISEASES OF THE MUSCULOSKELETAL SYSTEM AND CONNECTIVE TISSUE: ICD-10-CM

## 2024-02-14 PROCEDURE — 99213 OFFICE O/P EST LOW 20 MIN: CPT

## 2024-02-14 RX ORDER — CANDESARTAN CILEXETIL 16 MG/1
16 TABLET ORAL
Qty: 180 | Refills: 1 | Status: ACTIVE | COMMUNITY

## 2024-02-14 NOTE — PHYSICAL EXAM
[General Appearance - Well Developed] : well developed [Normal Conjunctiva] : the conjunctiva exhibited no abnormalities [Normal Appearance] : normal appearance [Normal Oral Mucosa] : normal oral mucosa [No Oral Pallor] : no oral pallor [No Oral Cyanosis] : no oral cyanosis [JVD Elevated _____cm] : JVD elevated [unfilled] ~U cm above clavicle [Normal Jugular Venous V Waves Present] : normal jugular venous V waves present [Respiration, Rhythm And Depth] : normal respiratory rhythm and effort [Exaggerated Use Of Accessory Muscles For Inspiration] : no accessory muscle use [Auscultation Breath Sounds / Voice Sounds] : lungs were clear to auscultation bilaterally [Normal Rate] : normal [Rhythm Regular] : regular [Normal S1] : normal S1 [Normal S2] : normal S2 [No Murmur] : no murmurs heard [2+] : left 2+ [No Pitting Edema] : no pitting edema present [Abdomen Soft] : soft [Abdomen Tenderness] : non-tender [Abdomen Mass (___ Cm)] : no abdominal mass palpated [Abnormal Walk] : normal gait [Gait - Sufficient For Exercise Testing] : the gait was sufficient for exercise testing [Nail Clubbing] : no clubbing of the fingernails [Cyanosis, Localized] : no localized cyanosis [Petechial Hemorrhages (___cm)] : no petechial hemorrhages [Skin Color & Pigmentation] : normal skin color and pigmentation [] : no rash [No Venous Stasis] : no venous stasis [Skin Lesions] : no skin lesions [No Skin Ulcers] : no skin ulcer [No Xanthoma] : no  xanthoma was observed [Oriented To Time, Place, And Person] : oriented to person, place, and time [Affect] : the affect was normal [Mood] : the mood was normal [No Anxiety] : not feeling anxious [S3] : no S3 [S4] : no S4 [Right Carotid Bruit] : no bruit heard over the right carotid [Left Carotid Bruit] : no bruit heard over the left carotid [Right Femoral Bruit] : no bruit heard over the right femoral artery [Left Femoral Bruit] : no bruit heard over the left femoral artery [FreeTextEntry1] : pt walks several times a week. More muscle cramping at least one x /wk

## 2024-02-14 NOTE — DISCUSSION/SUMMARY
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   8/20 4 stents RCA Dr Daniels, HLD,HTN, PAF MOHINI vasc 5 ,  DM, lumbar disc disease for clearance and AC management for epidural injections by Pain management Pt was for pre op clearance but has had chest pressure with and without exertion lasting 1 min several times associated with dizziness and slight ataxia. No diaphoresis. No palpitations. She is not sure if its coming from her back.Pt had equivocal Stress echo 7/22 had chest pain. Lexiscan 8/22 partial reperfusion inferolat ischemia  area of prior infarct. Dr Marte reviewed and will continue medical management on BB ARB ASA, CCB.  Instructed pt to go to ER for persistent pain. told pt to inform us of increased frequency of pain. Aware we would consider cardiac cath if pain increases .Consider adding Imdur. Pt does not want to take another med for now.  Pt sleeps poorly not sure if snores ,will refer to pulm for sleep eval. She is waiting to do that . She is having cramping in her thighs at least one/ wk . Supportive treatment ie stretching, hydration, heat prior to bed recommended. If no improvement will ck CPK and consider changing or reducing statin dose. Pt is very anxious and upset re not getting her epidural injections due to insurance delay. Anxiety treatment measures recommended deep breathing visual imagery.  Pt had a 3 min episode of  racing heart and SOB  no chest pain when she was very anxious. If any symptoms return  will need evaluation possible holter or MCOT. F/u 3 mth  Order stress and echo nexr visit , Pt with decreased EF consider entresto 4/26/23 Pt no longer w palpitations or chest pain. Only complaint is neck and LBP. Awaiting rheum eval. Seeing pain mgmt may need clearance for cervical epidural.  Will need to hold eliquis 3 days prior. Pt does not want to do any cardiac testing now. Will plan echo in July 2023 and Lexiscan in Aug. Labs ordered today. 7/26/23 Pain in neck and back better. NO chest pain.pt needs echo and lexiscan. She had positive stress test last year and needs f/u exam.  Pt needs sleep study has typical JAMILA s/s.She said she cant do test now but will in near future. Risks of JAMILA untreated discussed Pt will remain on current dose of potassium 20meq as level is still borderline low. 8/23/23 The pt is here for hosp f/u. Admitted 8/4/23 and discharged 8/6/23. Pt had squeezing upper abdominal pain radiating to her chest and back. Trops neg. Stress showed reversible defect inf lat wall.  This does not appear to be significantly changed from Lexiscan  8/19/22,The pt was started on Ranexa 500mg BID , adacand was reduced .Protonix was begun and pt was to have f/u w GI for EGD. Note A1c 7% LDL 87. Pt feels much better. She is on high daily dose of prednisone so should be on PPI for GI prevention. Will ck labs and review lipids in 1 mth pt needs LDL below 70. Consider switch to crestor.  Short term f/u 2 mths . Pt states endo Dr Collins said thryoid nodule was enlarged and needed bx. Cardio clearance letter written. 11/1/23  Pt lost candesartan pill bottle will renew for twice daily but pt will only take one daily as BP is stable . Had diarrhea for a few weeks now on MTX possible SE?. Labs reviewed w pt Renal fx w  cr 1.9 will monitor and  pt f/u  in Feb. Her back and legs are much better. Will continue present mgmt.  2/14/24 Saw renal took pt off HCTZ and K for 6 wks to assess renal fx. Pt feels well off of it. She has had intermittent RUQ cramping/ pain had abd US shows renal cyst and labs all reviewed w pt. PT will [facility]. Her  lipase and jan are mildly elevated. BUN/cr 38/2.1 done 2/6. Pt denies any chest pain palpitations syncope or near syncope. Labs reviewed  and US reviewed w pt. Pt with stable CAD GDMT, PAF on AC without increased bleeding and w h/o RA followed closely by rheum

## 2024-02-14 NOTE — HISTORY OF PRESENT ILLNESS
[FreeTextEntry1] : 76 y/o w PMH of CABG 12/05 s/p 4 stents 8/20, HLD, HTN PAF on eliquis, MOHINI vasc 5, DM stable, Severe DDD and cervical disc dse in chronic pain. Pt has h/o elevated PTH and hypothyroid followed by endo, and CKD followed by renal.  Pt has neck pain pain, back is improved. Dx with RA on MTX . She is feeling much better overall.  She had a mechanical fall  Dec8 and fx right ankle. Healing well without assistive devices now. Pt denies chest pain, shortness of breath,  palpitations,dizziness, syncope or near syncope.  Saw renal took pt off HCTZ and K for 6 wks to assess renal fx. Pt feels well off of it. She has had intermittent RUQ cramping/ pain had abd US shows renal cyst and labs all reviewed w pt. PT will need to f/u w PCP Sheila GUADALUPE in [place] office for further eval. Her  lipase and jan are mildly elevated. BUN/cr 38/2.1 done 2/6

## 2024-02-14 NOTE — REASON FOR VISIT
[Symptom and Test Evaluation] : symptom and test evaluation [Arrhythmia/ECG Abnorrmalities] : arrhythmia/ECG abnormalities [Hyperlipidemia] : hyperlipidemia [Hypertension] : hypertension [Coronary Artery Disease] : coronary artery disease [FreeTextEntry1] : Pt states I'm  here for routine f/u. Fx right ankle in Dec

## 2024-02-14 NOTE — REVIEW OF SYSTEMS
[Weight Gain (___ Lbs)] : [unfilled] ~Ulb weight gain [Weight Loss (___ Lbs)] : [unfilled] ~Ulb weight loss [Blood In The Urine] : hematuria [Joint Pain] : joint pain [Negative] : Heme/Lymph [Fever] : no fever [Chills] : no chills [Blurry Vision] : no blurred vision [Earache] : no earache [SOB] : no shortness of breath [Chest Discomfort] : no chest discomfort [Lower Ext Edema] : no extremity edema [Palpitations] : no palpitations [Syncope] : no syncope [Cough] : no cough [Wheezing] : no wheezing [Abdominal Pain] : no abdominal pain [Constipation] : no constipation [Rash] : no rash [Skin Lesions] : no skin lesions [Dizziness] : no dizziness [Weakness] : no weakness [Confusion] : no confusion was observed [FreeTextEntry2] : Sleeps poorly, intermittently  says she snores. Feels tired [FreeTextEntry9] : neck pain   and  back better

## 2024-02-19 ENCOUNTER — APPOINTMENT (OUTPATIENT)
Dept: ORTHOPEDIC SURGERY | Facility: CLINIC | Age: 79
End: 2024-02-19
Payer: MEDICARE

## 2024-02-19 DIAGNOSIS — S82.831A OTHER FRACTURE OF UPPER AND LOWER END OF RIGHT FIBULA, INITIAL ENCOUNTER FOR CLOSED FRACTURE: ICD-10-CM

## 2024-02-19 PROCEDURE — 73610 X-RAY EXAM OF ANKLE: CPT | Mod: RT

## 2024-02-19 PROCEDURE — 99024 POSTOP FOLLOW-UP VISIT: CPT

## 2024-02-19 NOTE — DISCUSSION/SUMMARY
[de-identified] : I discussed the patient's findings with her.  At this point the patient can resume activity as tolerated.  I reassured her at this point that she may have some residual pain here and there but overall trajectory should be in the downward direction for pain.  She can follow-up with me on an as-needed basis.

## 2024-02-19 NOTE — PHYSICAL EXAM
[de-identified] : Minimally tender at the lateral malleolus.  Full range of motion.  Compartment soft compressible.  Neurovascular intact distally.  Her midfoot is stable.  No evidence of Lisfranc ligament compromise.

## 2024-02-19 NOTE — HISTORY OF PRESENT ILLNESS
[de-identified] : Follow-up right ankle fracture.  Patient is doing better.  Still has some residual pain in the foot.  Does radiate up at times.  However the ankle itself is minimally tender.

## 2024-02-19 NOTE — DATA REVIEWED
[FreeTextEntry1] : 3 views right ankle ordered and reviewed by me personally.  She is a healed Mcgill B lateral malleolus fracture.  The ankle joint is congruent.  No interval displacement.

## 2024-02-28 NOTE — ASU PATIENT PROFILE, ADULT - REASON FOR ADMISSION, PROFILE
Wound Eval / Progress Noted    KEO Dominguez     Patient Name: Jose Shaikh  : 1958  MRN: [id number]  Today's Date: 2024                 Admit Date: 2023    Visit Dx:    ICD-10-CM ICD-9-CM   1. Difficulty in walking  R26.2 719.7   2. Type 2 diabetes mellitus with other specified complication, unspecified whether long term insulin use  E11.69 250.80         Debility    Ulcer of right foot    Ulcerated, foot, left, limited to breakdown of skin    Onychomycosis        Past Medical History:   Diagnosis Date    Absence of toe of right foot     Acute osteomyelitis of left calcaneus  2021    Anxiety and depression     Arthritis     Cancer     Chronic pain     STATES HIS PAIN IS 10/10 AAT    Claustrophobia     Corns and callus     Diabetic ulcer of left heel associated with type 2 DM 2021    Diabetic ulcer of left heel associated with type 2 DM 2021    Diabetic ulcer of right midfoot associated with type 2 DM 2021    Difficulty walking     Essential hypertension 2021    Hammertoe     Hyperlipidemia LDL goal <100 2021    Ingrown toenail     Obesity     Paroxysmal atrial fibrillation 2021    Polyneuropathy     Pressure ulcer, stage 1     Tinea unguium     Type 2 diabetes mellitus with polyneuropathy         Past Surgical History:   Procedure Laterality Date    CYST REMOVAL      center of back; benign    EYE SURGERY      INCISION AND DRAINAGE ABSCESS      back    INCISION AND DRAINAGE LEG Right 12/10/2021    Procedure: INCISION AND DRAINAGE LOWER EXTREMITY;  Surgeon: Ash Leyva DPM;  Location: [facility] MAIN OR;  Service: Podiatry;  Laterality: Right;    OTHER SURGICAL HISTORY      Surgical clips left foot    TOE SURGERY Right     Removal of 5th toe    TRANS METATARSAL AMPUTATION Right 2021    Procedure: AMPUTATION TRANS METATARSAL;  Surgeon: Ash Leyva DPM;  Location: [facility] MAIN OR;  Service: Podiatry;  Laterality: Right;    VASCULAR  SURGERY      WRIST SURGERY Left     repair of injury         Physical Assessment:  Wound 01/22/24 1343 Left anterior fourth toe Abrasion (Active)   Wound Image   02/28/24 1135   Dressing Appearance open to air 02/28/24 1135   Closure None 02/28/24 1135   Base necrotic;dry 02/28/24 1135   Periwound dry;intact 02/28/24 1135   Periwound Temperature warm 02/28/24 1135   Periwound Skin Turgor soft 02/28/24 1135   Edges rolled/closed 02/28/24 1135   Drainage Amount none 02/28/24 1135   Care, Wound cleansed with;sterile normal saline;honey applied 02/28/24 1135   Dressing Care dressing applied;elastic bandage 02/28/24 1135   Periwound Care absorptive dressing applied 02/28/24 1135     Wound Check / Follow-up:  Patient seen today for a wound follow-up and dressing change. Patient is awake alert and oriented.     Left fourth toe ulceration with dry crusted tissue covering full wound base. Periwound is soft and intact. Cleansed with NS and applied medi-honey and band-aid. Recommending to continue current care at this time.     Right lower leg with patch of dry scaly skin; no open or drainage. Recommending to provide quality skin care and hygiene daily and application of purple top moisturizer.      Patient denying any other needs at this time. Would not allow to assess buttocks.    Impression: ulceration to the left fourth toe, dry skin to the right lower leg     Short term goals:  regain skin integrity, every other day dressing changes, skin care / hygiene    Karon Bolton RN    2/28/2024    13:22 EST    patient states she has fibroids & vaginal pain

## 2024-03-06 ENCOUNTER — RX RENEWAL (OUTPATIENT)
Age: 79
End: 2024-03-06

## 2024-03-06 RX ORDER — AMLODIPINE BESYLATE 10 MG/1
10 TABLET ORAL DAILY
Qty: 90 | Refills: 3 | Status: ACTIVE | COMMUNITY
Start: 2023-05-08 | End: 1900-01-01

## 2024-04-04 ENCOUNTER — RX RENEWAL (OUTPATIENT)
Age: 79
End: 2024-04-04

## 2024-04-16 NOTE — PATIENT PROFILE ADULT. - FUNCTIONAL LEVEL PRIOR: EATING
[FreeTextEntry1] : Julissa presents to the office today c/o increased nocturia "for a couple of months but started getting worse the last 2 weeks or so". Pt denies any fever, chills, dysuria, hematuria or flank pain. Denies sensation of incomplete bladder emptying. Pt last +UC 8/2023 >100,000 CFU/ml Proteus mirabilis treated with Bactrim. Pt currently on Methenamine for recurrent UTI ppx. Pt was last seen in this office 7/2022 and states could not come in sooner to evaluate symptoms due to caring for 89yo  and personal h/o breast cancer and Hodgkins lymphoma. Pt with h/o OAB and has been on OAB medications in the past, not sure which ones, but feels symptoms are returning and considering resuming an OAB medication again.  Pt with h/o POP has RS with a knob #3, performing self-care and states took pessary out two weeks ago due to bleeding. Pt presents with pessary in a ziploc bag. Has vaginal estrogen (which was ok'd by oncologist) which she uses ut not consistently. Reports good support when pessary is in place, but states "I am finding it hard to take out". Denies any pelvic pain or pressure. Denies BOUBACAR. Pt states pessary used to help with UUI symptoms but does not believe it to be helping with that anymore.   Price (Do Not Change): 0.00 Detail Level: Generalized Instructions: This plan will send the code FBSE to the PM system.  DO NOT or CHANGE the price. (0) independent

## 2024-05-02 ENCOUNTER — RX RENEWAL (OUTPATIENT)
Age: 79
End: 2024-05-02

## 2024-06-03 PROBLEM — R94.39 ABNORMAL NUCLEAR STRESS TEST: Status: ACTIVE | Noted: 2023-07-24

## 2024-06-03 PROBLEM — S93.401A SPRAIN OF RIGHT ANKLE, INITIAL ENCOUNTER: Status: RESOLVED | Noted: 2023-12-08 | Resolved: 2024-06-03

## 2024-06-03 PROBLEM — I48.0 PAROXYSMAL ATRIAL FIBRILLATION: Status: ACTIVE | Noted: 2021-07-19

## 2024-06-05 ENCOUNTER — APPOINTMENT (OUTPATIENT)
Dept: CARDIOLOGY | Facility: CLINIC | Age: 79
End: 2024-06-05
Payer: MEDICARE

## 2024-06-05 VITALS
OXYGEN SATURATION: 98 % | HEART RATE: 61 BPM | DIASTOLIC BLOOD PRESSURE: 70 MMHG | HEIGHT: 62 IN | BODY MASS INDEX: 23.37 KG/M2 | SYSTOLIC BLOOD PRESSURE: 120 MMHG | WEIGHT: 127 LBS

## 2024-06-05 DIAGNOSIS — S93.401A SPRAIN OF UNSPECIFIED LIGAMENT OF RIGHT ANKLE, INITIAL ENCOUNTER: ICD-10-CM

## 2024-06-05 DIAGNOSIS — I10 ESSENTIAL (PRIMARY) HYPERTENSION: ICD-10-CM

## 2024-06-05 DIAGNOSIS — R94.39 ABNORMAL RESULT OF OTHER CARDIOVASCULAR FUNCTION STUDY: ICD-10-CM

## 2024-06-05 DIAGNOSIS — I25.10 ATHEROSCLEROTIC HEART DISEASE OF NATIVE CORONARY ARTERY W/OUT ANGINA PECTORIS: ICD-10-CM

## 2024-06-05 DIAGNOSIS — I48.0 PAROXYSMAL ATRIAL FIBRILLATION: ICD-10-CM

## 2024-06-05 DIAGNOSIS — E78.5 HYPERLIPIDEMIA, UNSPECIFIED: ICD-10-CM

## 2024-06-05 PROCEDURE — 99213 OFFICE O/P EST LOW 20 MIN: CPT

## 2024-06-05 PROCEDURE — 93000 ELECTROCARDIOGRAM COMPLETE: CPT

## 2024-06-05 NOTE — DISCUSSION/SUMMARY
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   8/20 4 stents RCA Dr Daniels, HLD,HTN, PAF MOHINI vasc 5 ,  DM, lumbar disc disease for clearance and AC management for epidural injections by Pain management Pt was for pre op clearance but has had chest pressure with and without exertion lasting 1 min several times associated with dizziness and slight ataxia. No diaphoresis. No palpitations. She is not sure if its coming from her back.Pt had equivocal Stress echo 7/22 had chest pain. Lexiscan 8/22 partial reperfusion inferolat ischemia  area of prior infarct. Dr Marte reviewed and will continue medical management on BB ARB ASA, CCB.  Instructed pt to go to ER for persistent pain. told pt to inform us of increased frequency of pain. Aware we would consider cardiac cath if pain increases .Consider adding Imdur. Pt does not want to take another med for now.  Pt sleeps poorly not sure if snores ,will refer to pulm for sleep eval. She is waiting to do that . She is having cramping in her thighs at least one/ wk . Supportive treatment ie stretching, hydration, heat prior to bed recommended. If no improvement will ck CPK and consider changing or reducing statin dose. Pt is very anxious and upset re not getting her epidural injections due to insurance delay. Anxiety treatment measures recommended deep breathing visual imagery.  Pt had a 3 min episode of  racing heart and SOB  no chest pain when she was very anxious. If any symptoms return  will need evaluation possible holter or MCOT. F/u 3 mth  Order stress and echo nexr visit , Pt with decreased EF consider entresto 4/26/23 Pt no longer w palpitations or chest pain. Only complaint is neck and LBP. Awaiting rheum eval. Seeing pain mgmt may need clearance for cervical epidural.  Will need to hold eliquis 3 days prior. Pt does not want to do any cardiac testing now. Will plan echo in July 2023 and Lexiscan in Aug. Labs ordered today. 7/26/23 Pain in neck and back better. NO chest pain.pt needs echo and lexiscan. She had positive stress test last year and needs f/u exam.  Pt needs sleep study has typical JAMILA s/s.She said she cant do test now but will in near future. Risks of JAMILA untreated discussed Pt will remain on current dose of potassium 20meq as level is still borderline low. 8/23/23 The pt is here for hosp f/u. Admitted 8/4/23 and discharged 8/6/23. Pt had squeezing upper abdominal pain radiating to her chest and back. Trops neg. Stress showed reversible defect inf lat wall.  This does not appear to be significantly changed from Lexiscan  8/19/22,The pt was started on Ranexa 500mg BID , adacand was reduced .Protonix was begun and pt was to have f/u w GI for EGD. Note A1c 7% LDL 87. Pt feels much better. She is on high daily dose of prednisone so should be on PPI for GI prevention. Will ck labs and review lipids in 1 mth pt needs LDL below 70. Consider switch to crestor.  Short term f/u 2 mths . Pt states endo Dr Colilns said thryoid nodule was enlarged and needed bx. Cardio clearance letter written. 11/1/23  Pt lost candesartan pill bottle will renew for twice daily but pt will only take one daily as BP is stable . Had diarrhea for a few weeks now on MTX possible SE?. Labs reviewed w pt Renal fx w  cr 1.9 will monitor and  pt f/u  in Feb. Her back and legs are much better. Will continue present mgmt.  2/14/24 Saw renal took pt off HCTZ and K for 6 wks to assess renal fx. Pt feels well off of it. She has had intermittent RUQ cramping/ pain had abd US shows renal cyst and labs all reviewed w pt. PT will need to f/u w PCP Sheila GUADALUPE in [facility] office for further eval. Her  lipase and jna are mildly elevated. BUN/cr 38/2.1 done 2/6. Pt denies any chest pain palpitations syncope or near syncope. Labs reviewed  and US reviewed w pt. Pt with stable CAD GDMT, PAF on AC without increased bleeding and w h/o RA followed closely by rheum.6/5/25 Feeling better overall . Cant walk as much due to previous foot injury. P. Her back is better trying to get more exercise. Need labs repeated going to renal in 2 wk copy to us. Ordered nuc stress test needs yearly due to h/o abnormal result and need to follow and repeat echo .  EKG NS ST wave changes

## 2024-06-05 NOTE — REVIEW OF SYSTEMS
[Weight Gain (___ Lbs)] : [unfilled] ~Ulb weight gain [Weight Loss (___ Lbs)] : [unfilled] ~Ulb weight loss [Blood In The Urine] : hematuria [Joint Pain] : joint pain [Negative] : Heme/Lymph [Fever] : no fever [Chills] : no chills [Blurry Vision] : no blurred vision [Earache] : no earache [SOB] : no shortness of breath [Chest Discomfort] : no chest discomfort [Lower Ext Edema] : no extremity edema [Palpitations] : no palpitations [Syncope] : no syncope [Cough] : no cough [Wheezing] : no wheezing [Abdominal Pain] : no abdominal pain [Constipation] : no constipation [Rash] : no rash [Skin Lesions] : no skin lesions [Dizziness] : no dizziness [Weakness] : no weakness [Confusion] : no confusion was observed [FreeTextEntry2] : Sleeping better [FreeTextEntry9] : neck pain   and  back better

## 2024-06-05 NOTE — HISTORY OF PRESENT ILLNESS
[FreeTextEntry1] : 76 y/o w PMH of CABG 12/05 s/p 4 stents 8/20, HLD, HTN PAF on eliquis, MOHINI vasc 5, DM stable, Severe DDD and cervical disc dse in chronic pain. Pt has h/o elevated PTH and hypothyroid followed by endo, and CKD followed by renal.  Pt has neck pain pain, back is improved. Dx with RA on MTX .  She had a mechanical fall  Dec8 2023 and fx right ankle.  6/5/25 Feeling better overall . Cant walk as much due to previous foot injury. Pt denies chest pain, shortness of breath,  palpitations ,dizziness, syncope or near syncope.. Her back is better trying to get more exercise

## 2024-06-05 NOTE — REASON FOR VISIT
[Symptom and Test Evaluation] : symptom and test evaluation [Arrhythmia/ECG Abnorrmalities] : arrhythmia/ECG abnormalities [Hyperlipidemia] : hyperlipidemia [Hypertension] : hypertension [Coronary Artery Disease] : coronary artery disease [FreeTextEntry1] : Pt states I'm  here for routine f/u. Having cataract surgery sept and OCt

## 2024-07-01 ENCOUNTER — RX RENEWAL (OUTPATIENT)
Age: 79
End: 2024-07-01

## 2024-07-22 ENCOUNTER — APPOINTMENT (OUTPATIENT)
Dept: CV DIAGNOSTICS | Facility: HOSPITAL | Age: 79
End: 2024-07-22
Payer: MEDICARE

## 2024-07-22 ENCOUNTER — APPOINTMENT (OUTPATIENT)
Dept: CV DIAGNOSITCS | Facility: HOSPITAL | Age: 79
End: 2024-07-22

## 2024-07-22 ENCOUNTER — RESULT REVIEW (OUTPATIENT)
Age: 79
End: 2024-07-22

## 2024-07-22 PROCEDURE — 78452 HT MUSCLE IMAGE SPECT MULT: CPT | Mod: 26,MC

## 2024-07-22 PROCEDURE — 93018 CV STRESS TEST I&R ONLY: CPT

## 2024-07-22 PROCEDURE — 93306 TTE W/DOPPLER COMPLETE: CPT | Mod: 26

## 2024-07-22 PROCEDURE — 93016 CV STRESS TEST SUPVJ ONLY: CPT

## 2024-08-05 NOTE — ED ADULT NURSE NOTE - PAIN RATING/NUMBER SCALE (0-10): ACTIVITY
N/A Patient is under age 18 and does not have a history of high risk behavior or is not high risk for Hep C 0 (no pain/absence of nonverbal indicators of pain)

## 2024-08-11 PROBLEM — Z87.39 HISTORY OF RHEUMATOID ARTHRITIS: Status: RESOLVED | Noted: 2021-07-19 | Resolved: 2024-08-11

## 2024-08-14 ENCOUNTER — APPOINTMENT (OUTPATIENT)
Dept: CARDIOLOGY | Facility: CLINIC | Age: 79
End: 2024-08-14
Payer: MEDICARE

## 2024-08-14 VITALS
DIASTOLIC BLOOD PRESSURE: 80 MMHG | HEART RATE: 75 BPM | BODY MASS INDEX: 23.19 KG/M2 | OXYGEN SATURATION: 99 % | SYSTOLIC BLOOD PRESSURE: 120 MMHG | HEIGHT: 62 IN | WEIGHT: 126 LBS

## 2024-08-14 DIAGNOSIS — I10 ESSENTIAL (PRIMARY) HYPERTENSION: ICD-10-CM

## 2024-08-14 DIAGNOSIS — Z87.39 PERSONAL HISTORY OF OTHER DISEASES OF THE MUSCULOSKELETAL SYSTEM AND CONNECTIVE TISSUE: ICD-10-CM

## 2024-08-14 DIAGNOSIS — I25.2 OLD MYOCARDIAL INFARCTION: ICD-10-CM

## 2024-08-14 DIAGNOSIS — I48.0 PAROXYSMAL ATRIAL FIBRILLATION: ICD-10-CM

## 2024-08-14 DIAGNOSIS — I25.10 ATHEROSCLEROTIC HEART DISEASE OF NATIVE CORONARY ARTERY W/OUT ANGINA PECTORIS: ICD-10-CM

## 2024-08-14 DIAGNOSIS — E78.5 HYPERLIPIDEMIA, UNSPECIFIED: ICD-10-CM

## 2024-08-14 PROCEDURE — G2211 COMPLEX E/M VISIT ADD ON: CPT

## 2024-08-14 PROCEDURE — 99213 OFFICE O/P EST LOW 20 MIN: CPT

## 2024-08-14 RX ORDER — CALCITRIOL 0.25 UG/1
0.25 CAPSULE, LIQUID FILLED ORAL WEEKLY
Refills: 0 | Status: ACTIVE | COMMUNITY

## 2024-08-14 RX ORDER — AMLODIPINE BESYLATE 5 MG/1
5 TABLET ORAL DAILY
Qty: 90 | Refills: 1 | Status: ACTIVE | COMMUNITY

## 2024-08-14 NOTE — REVIEW OF SYSTEMS
[Fever] : no fever [Chills] : no chills [Blurry Vision] : no blurred vision [Earache] : no earache [SOB] : no shortness of breath [Chest Discomfort] : no chest discomfort [Lower Ext Edema] : no extremity edema [Palpitations] : no palpitations no [Syncope] : no syncope [Cough] : no cough [Wheezing] : no wheezing [Abdominal Pain] : no abdominal pain [Constipation] : no constipation [Rash] : no rash [Skin Lesions] : no skin lesions [Dizziness] : no dizziness [Weakness] : no weakness [Confusion] : no confusion was observed [FreeTextEntry2] : Sleeping better [FreeTextEntry9] :   back better

## 2024-08-14 NOTE — REVIEW OF SYSTEMS
[Fever] : no fever [Chills] : no chills [Blurry Vision] : no blurred vision [Earache] : no earache [SOB] : no shortness of breath [Chest Discomfort] : no chest discomfort [Lower Ext Edema] : no extremity edema [Palpitations] : no palpitations [Syncope] : no syncope [Cough] : no cough [Wheezing] : no wheezing [Abdominal Pain] : no abdominal pain [Constipation] : no constipation [Rash] : no rash [Skin Lesions] : no skin lesions [Dizziness] : no dizziness [Weakness] : no weakness [Confusion] : no confusion was observed [FreeTextEntry2] : Sleeping better [FreeTextEntry9] :   back better

## 2024-08-14 NOTE — DISCUSSION/SUMMARY
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   8/20 4 stents RCA Dr Daniels, HLD,HTN, PAF MOHINI vasc 5 ,  DM, lumbar disc disease for clearance and AC management for epidural injections by Pain management Pt was for pre op clearance but has had chest pressure with and without exertion lasting 1 min several times associated with dizziness and slight ataxia. No diaphoresis. No palpitations. She is not sure if its coming from her back.Pt had equivocal Stress echo 7/22 had chest pain. Lexiscan 8/22 partial reperfusion inferolat ischemia  area of prior infarct. Dr Marte reviewed and will continue medical management on BB ARB ASA, CCB.  Instructed pt to go to ER for persistent pain. told pt to inform us of increased frequency of pain. Aware we would consider cardiac cath if pain increases .Consider adding Imdur. Pt does not want to take another med for now.  Pt sleeps poorly not sure if snores ,will refer to pulm for sleep eval. She is waiting to do that . She is having cramping in her thighs at least one/ wk . Supportive treatment ie stretching, hydration, heat prior to bed recommended. If no improvement will ck CPK and consider changing or reducing statin dose. Pt is very anxious and upset re not getting her epidural injections due to insurance delay. Anxiety treatment measures recommended deep breathing visual imagery.  Pt had a 3 min episode of  racing heart and SOB  no chest pain when she was very anxious. If any symptoms return  will need evaluation possible holter or MCOT. F/u 3 mth  Order stress and echo nexr visit , Pt with decreased EF consider entresto 4/26/23 Pt no longer w palpitations or chest pain. Only complaint is neck and LBP. Awaiting rheum eval. Seeing pain mgmt may need clearance for cervical epidural.  Will need to hold eliquis 3 days prior. Pt does not want to do any cardiac testing now. Will plan echo in July 2023 and Lexiscan in Aug. Labs ordered today. 7/26/23 Pain in neck and back better. NO chest pain.pt needs echo and lexiscan. She had positive stress test last year and needs f/u exam.  Pt needs sleep study has typical JAMILA s/s.She said she cant do test now but will in near future. Risks of JAMILA untreated discussed Pt will remain on current dose of potassium 20meq as level is still borderline low. 8/23/23 The pt is here for hosp f/u. Admitted 8/4/23 and discharged 8/6/23. Pt had squeezing upper abdominal pain radiating to her chest and back. Trops neg. Stress showed reversible defect inf lat wall.  This does not appear to be significantly changed from Lexiscan  8/19/22,The pt was started on Ranexa 500mg BID , adacand was reduced .Protonix was begun and pt was to have f/u w GI for EGD. Note A1c 7% LDL 87. Pt feels much better. She is on high daily dose of prednisone so should be on PPI for GI prevention. Will ck labs and review lipids in 1 mth pt needs LDL below 70. Consider switch to crestor.  Short term f/u 2 mths . Pt states endo Dr Collins said thryoid nodule was enlarged and needed bx. Cardio clearance letter written. 11/1/23  Pt lost candesartan pill bottle will renew for twice daily but pt will only take one daily as BP is stable . Had diarrhea for a few weeks now on MTX possible SE?. Labs reviewed w pt Renal fx w  cr 1.9 will monitor and  pt f/u  in Feb. Her back and legs are much better. Will continue present mgmt.  2/14/24 Saw renal took pt off HCTZ and K for 6 wks to assess renal fx. Pt feels well off of it. She has had intermittent RUQ cramping/ pain had abd US shows renal cyst and labs all reviewed w pt. PT will need to f/u w PCP Sheila GUADALUPE in [facility] office for further eval. Her  lipase and jan are mildly elevated. BUN/cr 38/2.1 done 2/6. Pt denies any chest pain palpitations syncope or near syncope. Labs reviewed  and US reviewed w pt. Pt with stable CAD GDMT, PAF on AC without increased bleeding and w h/o RA followed closely by rheum.6/5/25 Feeling better overall . Cant walk as much due to previous foot injury. P. Her back is better trying to get more exercise. Need labs repeated going to renal in 2 wk copy to us. Ordered nuc stress test needs yearly due to h/o abnormal result and need to follow and repeat echo .  EKG NS ST wave changes 8/14/24  On 7/22/24 Pt had Jagruti  which was normal and ECHO LVEF 57% unchanged segmental inf wall and inf septal wall akinetic /hypo. pt has no symptoms. Feels well. Having cataract surgery. Was told to hold eliquis 3 days. Dr Marte would prefer not holding it more than 2 days. Pt is a CHADS vasc 5. Informed pt of above and will contact surgeon and talk to us if needed.

## 2024-08-14 NOTE — REASON FOR VISIT
[FreeTextEntry1] : Pt states I'm  here for routine f/u. Having cataract surgery early Sept and OCt Dr HERNANDEZ

## 2024-08-14 NOTE — DISCUSSION/SUMMARY
Patient did not respond to Mychart invite or phone call. Needs to be rescheduled. 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   8/20 4 stents RCA Dr Daniels, HLD,HTN, PAF MOHINI vasc 5 ,  DM, lumbar disc disease for clearance and AC management for epidural injections by Pain management Pt was for pre op clearance but has had chest pressure with and without exertion lasting 1 min several times associated with dizziness and slight ataxia. No diaphoresis. No palpitations. She is not sure if its coming from her back.Pt had equivocal Stress echo 7/22 had chest pain. Lexiscan 8/22 partial reperfusion inferolat ischemia  area of prior infarct. Dr Marte reviewed and will continue medical management on BB ARB ASA, CCB.  Instructed pt to go to ER for persistent pain. told pt to inform us of increased frequency of pain. Aware we would consider cardiac cath if pain increases .Consider adding Imdur. Pt does not want to take another med for now.  Pt sleeps poorly not sure if snores ,will refer to pulm for sleep eval. She is waiting to do that . She is having cramping in her thighs at least one/ wk . Supportive treatment ie stretching, hydration, heat prior to bed recommended. If no improvement will ck CPK and consider changing or reducing statin dose. Pt is very anxious and upset re not getting her epidural injections due to insurance delay. Anxiety treatment measures recommended deep breathing visual imagery.  Pt had a 3 min episode of  racing heart and SOB  no chest pain when she was very anxious. If any symptoms return  will need evaluation possible holter or MCOT. F/u 3 mth  Order stress and echo nexr visit , Pt with decreased EF consider entresto 4/26/23 Pt no longer w palpitations or chest pain. Only complaint is neck and LBP. Awaiting rheum eval. Seeing pain mgmt may need clearance for cervical epidural.  Will need to hold eliquis 3 days prior. Pt does not want to do any cardiac testing now. Will plan echo in July 2023 and Lexiscan in Aug. Labs ordered today. 7/26/23 Pain in neck and back better. NO chest pain.pt needs echo and lexiscan. She had positive stress test last year and needs f/u exam.  Pt needs sleep study has typical JAMILA s/s.She said she cant do test now but will in near future. Risks of JAMILA untreated discussed Pt will remain on current dose of potassium 20meq as level is still borderline low. 8/23/23 The pt is here for hosp f/u. Admitted 8/4/23 and discharged 8/6/23. Pt had squeezing upper abdominal pain radiating to her chest and back. Trops neg. Stress showed reversible defect inf lat wall.  This does not appear to be significantly changed from Lexiscan  8/19/22,The pt was started on Ranexa 500mg BID , adacand was reduced .Protonix was begun and pt was to have f/u w GI for EGD. Note A1c 7% LDL 87. Pt feels much better. She is on high daily dose of prednisone so should be on PPI for GI prevention. Will ck labs and review lipids in 1 mth pt needs LDL below 70. Consider switch to crestor.  Short term f/u 2 mths . Pt states endo Dr Collins said thryoid nodule was enlarged and needed bx. Cardio clearance letter written. 11/1/23  Pt lost candesartan pill bottle will renew for twice daily but pt will only take one daily as BP is stable . Had diarrhea for a few weeks now on MTX possible SE?. Labs reviewed w pt Renal fx w  cr 1.9 will monitor and  pt f/u  in Feb. Her back and legs are much better. Will continue present mgmt.  2/14/24 Saw renal took pt off HCTZ and K for 6 wks to assess renal fx. Pt feels well off of it. She has had intermittent RUQ cramping/ pain had abd US shows renal cyst and labs all reviewed w pt. PT will need to f/u w PCP Sheila GUADALUPE in [facility] office for further eval. Her  lipase and jan are mildly elevated. BUN/cr 38/2.1 done 2/6. Pt denies any chest pain palpitations syncope or near syncope. Labs reviewed  and US reviewed w pt. Pt with stable CAD GDMT, PAF on AC without increased bleeding and w h/o RA followed closely by rheum.6/5/25 Feeling better overall . Cant walk as much due to previous foot injury. P. Her back is better trying to get more exercise. Need labs repeated going to renal in 2 wk copy to us. Ordered nuc stress test needs yearly due to h/o abnormal result and need to follow and repeat echo .  EKG NS ST wave changes 8/14/24  On 7/22/24 Pt had Jagruti  which was normal and ECHO LVEF 57% unchanged segmental inf wall and inf septal wall akinetic /hypo. pt has no symptoms. Feels well. Having cataract surgery. Was told to hold eliquis 3 days. Dr Marte would prefer not holding it more than 2 days. Pt is a CHADS vasc 5. Informed pt of above and will contact surgeon and talk to us if needed.

## 2024-08-14 NOTE — HISTORY OF PRESENT ILLNESS
[FreeTextEntry1] : 78 y/o w PMH of CABG 12/05 s/p 4 stents 8/20, HLD, HTN PAF on eliquis, MOHINI vasc 5, DM stable, Severe DDD and cervical disc dse in chronic pain. Pt has h/o elevated PTH and hypothyroid followed by endo, and CKD followed by renal.  Pt has neck pain pain, back is improved. Dx with RA on MTX .  She had a mechanical fall  Dec8 2023 and fx right ankle.  6/5/25 Feeling better overall . Cant walk as much due to previous foot injury. Pt denies chest pain, shortness of breath,  palpitations ,dizziness, syncope or near syncope.. Her back is better trying to get more exercise 8/14/24 Pt returns feels well generalized pain much better. here to review echo and stress.  Pt denies chest pain, shortness of breath,  palpitations,dizziness, syncope or near syncope.

## 2024-09-04 ENCOUNTER — RX RENEWAL (OUTPATIENT)
Age: 79
End: 2024-09-04

## 2024-09-28 NOTE — PATIENT PROFILE ADULT - FOOD INSECURITY
PAST SURGICAL HISTORY:  History of surgery BILATERAL KNEE REPLACEMENT,    RIGHT HIP REPLACEMENT,  MARY IN RIGHT FEMUR,  CARDIAC STENTS X2,   CHOLECYSTECTOMY, right IOL    
no

## 2024-09-30 NOTE — ED ADULT TRIAGE NOTE - PAIN: PRESENCE, MLM
----- Message from David Burr sent at 9/30/2024  7:18 AM EDT -----  Call patient with results.  MRI with no evidence of splenic mass.  ----- Message -----  From: TRUSTe, Rad Results Solomon In  Sent: 9/27/2024   3:16 PM EDT  To: David Burr MD   complains of pain/discomfort

## 2024-10-11 ENCOUNTER — APPOINTMENT (OUTPATIENT)
Dept: ORTHOPEDIC SURGERY | Facility: CLINIC | Age: 79
End: 2024-10-11

## 2024-10-11 VITALS — BODY MASS INDEX: 24.17 KG/M2 | WEIGHT: 128 LBS | HEIGHT: 61 IN

## 2024-10-11 DIAGNOSIS — S63.501A UNSPECIFIED SPRAIN OF RIGHT WRIST, INITIAL ENCOUNTER: ICD-10-CM

## 2024-10-11 DIAGNOSIS — S50.11XA CONTUSION OF RIGHT FOREARM, INITIAL ENCOUNTER: ICD-10-CM

## 2024-10-11 DIAGNOSIS — S40.011A CONTUSION OF RIGHT SHOULDER, INITIAL ENCOUNTER: ICD-10-CM

## 2024-10-11 PROCEDURE — 99214 OFFICE O/P EST MOD 30 MIN: CPT

## 2024-10-11 PROCEDURE — 73090 X-RAY EXAM OF FOREARM: CPT | Mod: RT

## 2024-11-12 ENCOUNTER — APPOINTMENT (OUTPATIENT)
Dept: ORTHOPEDIC SURGERY | Facility: CLINIC | Age: 79
End: 2024-11-12

## 2024-11-20 ENCOUNTER — APPOINTMENT (OUTPATIENT)
Dept: CARDIOLOGY | Facility: CLINIC | Age: 79
End: 2024-11-20
Payer: MEDICARE

## 2024-11-20 ENCOUNTER — NON-APPOINTMENT (OUTPATIENT)
Age: 79
End: 2024-11-20

## 2024-11-20 VITALS
OXYGEN SATURATION: 98 % | WEIGHT: 124 LBS | DIASTOLIC BLOOD PRESSURE: 70 MMHG | BODY MASS INDEX: 23.41 KG/M2 | SYSTOLIC BLOOD PRESSURE: 120 MMHG | HEIGHT: 61 IN | HEART RATE: 75 BPM

## 2024-11-20 DIAGNOSIS — I25.2 OLD MYOCARDIAL INFARCTION: ICD-10-CM

## 2024-11-20 DIAGNOSIS — I25.10 ATHEROSCLEROTIC HEART DISEASE OF NATIVE CORONARY ARTERY W/OUT ANGINA PECTORIS: ICD-10-CM

## 2024-11-20 DIAGNOSIS — I10 ESSENTIAL (PRIMARY) HYPERTENSION: ICD-10-CM

## 2024-11-20 DIAGNOSIS — I48.0 PAROXYSMAL ATRIAL FIBRILLATION: ICD-10-CM

## 2024-11-20 PROCEDURE — 99213 OFFICE O/P EST LOW 20 MIN: CPT

## 2024-11-30 ENCOUNTER — RX RENEWAL (OUTPATIENT)
Age: 79
End: 2024-11-30

## 2024-12-03 ENCOUNTER — RX RENEWAL (OUTPATIENT)
Age: 79
End: 2024-12-03

## 2025-01-03 NOTE — ED PROVIDER NOTE - PRINCIPAL DIAGNOSIS
Pawel rivera to observe patient's vulvar skin changes recommends biopsy and clobetasol  I saw and evaluated the patient. I personally obtained the key and critical portions of the history and physical exam or was physically present for key and critical portions performed by the resident/fellow. I was present for and participated in vulvar biopsy. I Reviewed the resident/fellow's documentation and discussed the patient with the resident/fellow. I agree with the resident/fellow's medical decision making as documented in the note.      Jenny Moe, JOHN-DAXA       Flank pain

## 2025-01-27 ENCOUNTER — RX RENEWAL (OUTPATIENT)
Age: 80
End: 2025-01-27

## 2025-01-30 NOTE — PATIENT PROFILE ADULT. - VISION (WITH CORRECTIVE LENSES IF THE PATIENT USUALLY WEARS THEM):
Patient is having some allergy type symptoms (runny nose, blowing her nose a lot)    Nothing severe but she would like some home care advice and to discuss with PCP since she is on BP meds and PCP knows her history well.    RN offered sooner visits, but she would like to see PCP.    RN scheduled appt for 2/11 and patient verbalized understanding.    EMI Srinivasan  [place] Triage RN  [facility];    
Partially impaired: cannot see medication labels or newsprint, but can see obstacles in path, and the surrounding layout; can count fingers at arm's length

## 2025-02-19 ENCOUNTER — APPOINTMENT (OUTPATIENT)
Dept: CARDIOLOGY | Facility: CLINIC | Age: 80
End: 2025-02-19
Payer: MEDICARE

## 2025-02-19 ENCOUNTER — NON-APPOINTMENT (OUTPATIENT)
Age: 80
End: 2025-02-19

## 2025-02-19 VITALS
BODY MASS INDEX: 23.6 KG/M2 | WEIGHT: 125 LBS | HEART RATE: 67 BPM | SYSTOLIC BLOOD PRESSURE: 128 MMHG | HEIGHT: 61 IN | DIASTOLIC BLOOD PRESSURE: 74 MMHG | OXYGEN SATURATION: 97 %

## 2025-02-19 DIAGNOSIS — I10 ESSENTIAL (PRIMARY) HYPERTENSION: ICD-10-CM

## 2025-02-19 DIAGNOSIS — I48.0 PAROXYSMAL ATRIAL FIBRILLATION: ICD-10-CM

## 2025-02-19 DIAGNOSIS — E78.5 HYPERLIPIDEMIA, UNSPECIFIED: ICD-10-CM

## 2025-02-19 DIAGNOSIS — I25.10 ATHEROSCLEROTIC HEART DISEASE OF NATIVE CORONARY ARTERY W/OUT ANGINA PECTORIS: ICD-10-CM

## 2025-02-19 PROCEDURE — 99213 OFFICE O/P EST LOW 20 MIN: CPT | Mod: 25

## 2025-02-19 PROCEDURE — 93000 ELECTROCARDIOGRAM COMPLETE: CPT

## 2025-04-14 ENCOUNTER — RX RENEWAL (OUTPATIENT)
Age: 80
End: 2025-04-14

## 2025-04-17 ENCOUNTER — RX RENEWAL (OUTPATIENT)
Age: 80
End: 2025-04-17

## 2025-06-04 ENCOUNTER — APPOINTMENT (OUTPATIENT)
Dept: CARDIOLOGY | Facility: CLINIC | Age: 80
End: 2025-06-04
Payer: MEDICARE

## 2025-06-04 ENCOUNTER — NON-APPOINTMENT (OUTPATIENT)
Age: 80
End: 2025-06-04

## 2025-06-04 VITALS
WEIGHT: 122 LBS | OXYGEN SATURATION: 98 % | SYSTOLIC BLOOD PRESSURE: 135 MMHG | HEIGHT: 61 IN | DIASTOLIC BLOOD PRESSURE: 67 MMHG | BODY MASS INDEX: 23.03 KG/M2 | HEART RATE: 62 BPM

## 2025-06-04 DIAGNOSIS — I10 ESSENTIAL (PRIMARY) HYPERTENSION: ICD-10-CM

## 2025-06-04 DIAGNOSIS — I48.0 PAROXYSMAL ATRIAL FIBRILLATION: ICD-10-CM

## 2025-06-04 DIAGNOSIS — E78.5 HYPERLIPIDEMIA, UNSPECIFIED: ICD-10-CM

## 2025-06-04 DIAGNOSIS — E87.6 HYPOKALEMIA: ICD-10-CM

## 2025-06-04 DIAGNOSIS — I25.10 ATHEROSCLEROTIC HEART DISEASE OF NATIVE CORONARY ARTERY W/OUT ANGINA PECTORIS: ICD-10-CM

## 2025-06-04 PROCEDURE — 93000 ELECTROCARDIOGRAM COMPLETE: CPT

## 2025-06-04 PROCEDURE — 99213 OFFICE O/P EST LOW 20 MIN: CPT | Mod: 25

## 2025-06-10 NOTE — ED ADULT NURSE NOTE - BREATH SOUNDS, MLM
Copied from CRM #[id number]. Topic: Appointments - Appointment Access  >> Josué 10, 2025  3:42 PM Nurse Noemi wrote:  Returned call, LVM to contact office to schedule  
Clear

## 2025-06-27 NOTE — ED ADULT NURSE NOTE - NSFALLRSKASSESSTYPE_ED_ALL_ED
I acted within this role throughout the entirety of the procedure performed by the primary surgeon Initial (On Arrival)

## 2025-09-07 ENCOUNTER — EMERGENCY (EMERGENCY)
Facility: HOSPITAL | Age: 80
LOS: 0 days | Discharge: HOME | End: 2025-09-07
Attending: EMERGENCY MEDICINE
Payer: MEDICARE

## 2025-09-07 VITALS
HEIGHT: 62 IN | TEMPERATURE: 98 F | DIASTOLIC BLOOD PRESSURE: 74 MMHG | WEIGHT: 156.09 LBS | RESPIRATION RATE: 18 BRPM | SYSTOLIC BLOOD PRESSURE: 159 MMHG | HEART RATE: 76 BPM | OXYGEN SATURATION: 98 %

## 2025-09-07 VITALS
TEMPERATURE: 98 F | SYSTOLIC BLOOD PRESSURE: 157 MMHG | RESPIRATION RATE: 20 BRPM | HEART RATE: 58 BPM | DIASTOLIC BLOOD PRESSURE: 61 MMHG | OXYGEN SATURATION: 100 %

## 2025-09-07 DIAGNOSIS — R51.9 HEADACHE, UNSPECIFIED: ICD-10-CM

## 2025-09-07 DIAGNOSIS — Z88.8 ALLERGY STATUS TO OTHER DRUGS, MEDICAMENTS AND BIOLOGICAL SUBSTANCES: ICD-10-CM

## 2025-09-07 DIAGNOSIS — Z88.0 ALLERGY STATUS TO PENICILLIN: ICD-10-CM

## 2025-09-07 DIAGNOSIS — Z95.1 PRESENCE OF AORTOCORONARY BYPASS GRAFT: Chronic | ICD-10-CM

## 2025-09-07 DIAGNOSIS — M54.2 CERVICALGIA: ICD-10-CM

## 2025-09-07 DIAGNOSIS — Z87.19 PERSONAL HISTORY OF OTHER DISEASES OF THE DIGESTIVE SYSTEM: Chronic | ICD-10-CM

## 2025-09-07 DIAGNOSIS — Z98.890 OTHER SPECIFIED POSTPROCEDURAL STATES: Chronic | ICD-10-CM

## 2025-09-07 DIAGNOSIS — Z96.631 PRESENCE OF RIGHT ARTIFICIAL WRIST JOINT: Chronic | ICD-10-CM

## 2025-09-07 LAB
ALBUMIN SERPL ELPH-MCNC: 4.6 G/DL — SIGNIFICANT CHANGE UP (ref 3.5–5.2)
ALP SERPL-CCNC: 81 U/L — SIGNIFICANT CHANGE UP (ref 30–115)
ALT FLD-CCNC: 22 U/L — SIGNIFICANT CHANGE UP (ref 0–41)
ANION GAP SERPL CALC-SCNC: 11 MMOL/L — SIGNIFICANT CHANGE UP (ref 7–14)
AST SERPL-CCNC: 32 U/L — SIGNIFICANT CHANGE UP (ref 0–41)
BASOPHILS # BLD AUTO: 0.03 K/UL — SIGNIFICANT CHANGE UP (ref 0–0.2)
BASOPHILS NFR BLD AUTO: 0.6 % — SIGNIFICANT CHANGE UP (ref 0–2)
BILIRUB SERPL-MCNC: 0.5 MG/DL — SIGNIFICANT CHANGE UP (ref 0.2–1.2)
BUN SERPL-MCNC: 26 MG/DL — HIGH (ref 10–20)
CALCIUM SERPL-MCNC: 10.1 MG/DL — SIGNIFICANT CHANGE UP (ref 8.4–10.5)
CHLORIDE SERPL-SCNC: 102 MMOL/L — SIGNIFICANT CHANGE UP (ref 98–110)
CO2 SERPL-SCNC: 28 MMOL/L — SIGNIFICANT CHANGE UP (ref 17–32)
CREAT SERPL-MCNC: 1.4 MG/DL — SIGNIFICANT CHANGE UP (ref 0.7–1.5)
EGFR: 38 ML/MIN/1.73M2 — LOW
EGFR: 38 ML/MIN/1.73M2 — LOW
EOSINOPHIL # BLD AUTO: 0.11 K/UL — SIGNIFICANT CHANGE UP (ref 0–0.5)
EOSINOPHIL NFR BLD AUTO: 2.2 % — SIGNIFICANT CHANGE UP (ref 0–6)
GLUCOSE SERPL-MCNC: 157 MG/DL — HIGH (ref 70–99)
HCT VFR BLD CALC: 41.3 % — SIGNIFICANT CHANGE UP (ref 34.5–45)
HGB BLD-MCNC: 13.7 G/DL — SIGNIFICANT CHANGE UP (ref 11.5–15.5)
IMM GRANULOCYTES # BLD AUTO: 0.02 K/UL — SIGNIFICANT CHANGE UP (ref 0–0.07)
IMM GRANULOCYTES NFR BLD AUTO: 0.4 % — SIGNIFICANT CHANGE UP (ref 0–0.9)
LYMPHOCYTES # BLD AUTO: 0.73 K/UL — LOW (ref 1–3.3)
LYMPHOCYTES NFR BLD AUTO: 14.8 % — SIGNIFICANT CHANGE UP (ref 13–44)
MCHC RBC-ENTMCNC: 30.7 PG — SIGNIFICANT CHANGE UP (ref 27–34)
MCHC RBC-ENTMCNC: 33.2 G/DL — SIGNIFICANT CHANGE UP (ref 32–36)
MCV RBC AUTO: 92.6 FL — SIGNIFICANT CHANGE UP (ref 80–100)
MONOCYTES # BLD AUTO: 0.48 K/UL — SIGNIFICANT CHANGE UP (ref 0–0.9)
MONOCYTES NFR BLD AUTO: 9.8 % — SIGNIFICANT CHANGE UP (ref 2–14)
NEUTROPHILS # BLD AUTO: 3.55 K/UL — SIGNIFICANT CHANGE UP (ref 1.8–7.4)
NEUTROPHILS NFR BLD AUTO: 72.2 % — SIGNIFICANT CHANGE UP (ref 43–77)
NRBC # BLD AUTO: 0 K/UL — SIGNIFICANT CHANGE UP (ref 0–0)
NRBC # FLD: 0 K/UL — SIGNIFICANT CHANGE UP (ref 0–0)
NRBC BLD AUTO-RTO: 0 /100 WBCS — SIGNIFICANT CHANGE UP (ref 0–0)
PLATELET # BLD AUTO: 239 K/UL — SIGNIFICANT CHANGE UP (ref 150–400)
PMV BLD: 10.5 FL — SIGNIFICANT CHANGE UP (ref 7–13)
POTASSIUM SERPL-MCNC: 4.9 MMOL/L — SIGNIFICANT CHANGE UP (ref 3.5–5)
POTASSIUM SERPL-SCNC: 4.9 MMOL/L — SIGNIFICANT CHANGE UP (ref 3.5–5)
PROT SERPL-MCNC: 7.5 G/DL — SIGNIFICANT CHANGE UP (ref 6–8)
RBC # BLD: 4.46 M/UL — SIGNIFICANT CHANGE UP (ref 3.8–5.2)
RBC # FLD: 13.3 % — SIGNIFICANT CHANGE UP (ref 10.3–14.5)
SODIUM SERPL-SCNC: 141 MMOL/L — SIGNIFICANT CHANGE UP (ref 135–146)
WBC # BLD: 4.92 K/UL — SIGNIFICANT CHANGE UP (ref 3.8–10.5)
WBC # FLD AUTO: 4.92 K/UL — SIGNIFICANT CHANGE UP (ref 3.8–10.5)

## 2025-09-07 PROCEDURE — 72125 CT NECK SPINE W/O DYE: CPT

## 2025-09-07 PROCEDURE — 85025 COMPLETE CBC W/AUTO DIFF WBC: CPT

## 2025-09-07 PROCEDURE — 99284 EMERGENCY DEPT VISIT MOD MDM: CPT | Mod: 25

## 2025-09-07 PROCEDURE — 80053 COMPREHEN METABOLIC PANEL: CPT

## 2025-09-07 PROCEDURE — 72125 CT NECK SPINE W/O DYE: CPT | Mod: 26

## 2025-09-07 PROCEDURE — 70450 CT HEAD/BRAIN W/O DYE: CPT | Mod: 26

## 2025-09-07 PROCEDURE — 70450 CT HEAD/BRAIN W/O DYE: CPT

## 2025-09-07 PROCEDURE — 96375 TX/PRO/DX INJ NEW DRUG ADDON: CPT

## 2025-09-07 PROCEDURE — 96374 THER/PROPH/DIAG INJ IV PUSH: CPT

## 2025-09-07 PROCEDURE — 36415 COLL VENOUS BLD VENIPUNCTURE: CPT

## 2025-09-07 PROCEDURE — 99284 EMERGENCY DEPT VISIT MOD MDM: CPT

## 2025-09-07 RX ORDER — DEXAMETHASONE 0.5 MG/1
10 TABLET ORAL ONCE
Refills: 0 | Status: COMPLETED | OUTPATIENT
Start: 2025-09-07 | End: 2025-09-07

## 2025-09-07 RX ORDER — METOCLOPRAMIDE HCL 10 MG
10 TABLET ORAL ONCE
Refills: 0 | Status: COMPLETED | OUTPATIENT
Start: 2025-09-07 | End: 2025-09-07

## 2025-09-07 RX ORDER — METHOCARBAMOL 500 MG/1
1000 TABLET, FILM COATED ORAL ONCE
Refills: 0 | Status: COMPLETED | OUTPATIENT
Start: 2025-09-07 | End: 2025-09-07

## 2025-09-07 RX ORDER — KETOROLAC TROMETHAMINE 30 MG/ML
30 INJECTION, SOLUTION INTRAMUSCULAR; INTRAVENOUS ONCE
Refills: 0 | Status: DISCONTINUED | OUTPATIENT
Start: 2025-09-07 | End: 2025-09-07

## 2025-09-07 RX ADMIN — DEXAMETHASONE 102 MILLIGRAM(S): 0.5 TABLET ORAL at 08:33

## 2025-09-07 RX ADMIN — METHOCARBAMOL 1000 MILLIGRAM(S): 500 TABLET, FILM COATED ORAL at 08:33

## 2025-09-07 RX ADMIN — Medication 10 MILLIGRAM(S): at 08:33

## 2025-09-07 RX ADMIN — KETOROLAC TROMETHAMINE 30 MILLIGRAM(S): 30 INJECTION, SOLUTION INTRAMUSCULAR; INTRAVENOUS at 10:35
